# Patient Record
Sex: MALE | Race: BLACK OR AFRICAN AMERICAN | NOT HISPANIC OR LATINO | URBAN - METROPOLITAN AREA
[De-identification: names, ages, dates, MRNs, and addresses within clinical notes are randomized per-mention and may not be internally consistent; named-entity substitution may affect disease eponyms.]

---

## 2023-03-10 ENCOUNTER — INPATIENT (INPATIENT)
Facility: HOSPITAL | Age: 38
LOS: 3 days | Discharge: HOME | DRG: 286 | End: 2023-03-14
Attending: INTERNAL MEDICINE | Admitting: INTERNAL MEDICINE
Payer: MEDICAID

## 2023-03-10 VITALS
DIASTOLIC BLOOD PRESSURE: 104 MMHG | WEIGHT: 225.09 LBS | TEMPERATURE: 98 F | RESPIRATION RATE: 18 BRPM | OXYGEN SATURATION: 94 % | HEIGHT: 70 IN | SYSTOLIC BLOOD PRESSURE: 145 MMHG | HEART RATE: 106 BPM

## 2023-03-10 DIAGNOSIS — I50.9 HEART FAILURE, UNSPECIFIED: ICD-10-CM

## 2023-03-10 LAB
ALBUMIN SERPL ELPH-MCNC: 3.8 G/DL — SIGNIFICANT CHANGE UP (ref 3.3–5)
ALP SERPL-CCNC: 62 U/L — SIGNIFICANT CHANGE UP (ref 40–120)
ALT FLD-CCNC: 41 U/L — SIGNIFICANT CHANGE UP (ref 10–45)
ANION GAP SERPL CALC-SCNC: 9 MMOL/L — SIGNIFICANT CHANGE UP (ref 5–17)
APTT BLD: 29.1 SEC — SIGNIFICANT CHANGE UP (ref 27.5–35.5)
AST SERPL-CCNC: 24 U/L — SIGNIFICANT CHANGE UP (ref 10–40)
BASE EXCESS BLDV CALC-SCNC: 2.2 MMOL/L — SIGNIFICANT CHANGE UP (ref -2–3)
BASOPHILS # BLD AUTO: 0.02 K/UL — SIGNIFICANT CHANGE UP (ref 0–0.2)
BASOPHILS NFR BLD AUTO: 0.2 % — SIGNIFICANT CHANGE UP (ref 0–2)
BILIRUB SERPL-MCNC: 0.4 MG/DL — SIGNIFICANT CHANGE UP (ref 0.2–1.2)
BUN SERPL-MCNC: 14 MG/DL — SIGNIFICANT CHANGE UP (ref 7–23)
CA-I SERPL-SCNC: 1.14 MMOL/L — LOW (ref 1.15–1.33)
CALCIUM SERPL-MCNC: 8.8 MG/DL — SIGNIFICANT CHANGE UP (ref 8.4–10.5)
CHLORIDE BLDV-SCNC: 103 MMOL/L — SIGNIFICANT CHANGE UP (ref 96–108)
CHLORIDE SERPL-SCNC: 105 MMOL/L — SIGNIFICANT CHANGE UP (ref 96–108)
CO2 BLDV-SCNC: 30 MMOL/L — HIGH (ref 22–26)
CO2 SERPL-SCNC: 26 MMOL/L — SIGNIFICANT CHANGE UP (ref 22–31)
CREAT SERPL-MCNC: 1.06 MG/DL — SIGNIFICANT CHANGE UP (ref 0.5–1.3)
EGFR: 92 ML/MIN/1.73M2 — SIGNIFICANT CHANGE UP
EOSINOPHIL # BLD AUTO: 0.01 K/UL — SIGNIFICANT CHANGE UP (ref 0–0.5)
EOSINOPHIL NFR BLD AUTO: 0.1 % — SIGNIFICANT CHANGE UP (ref 0–6)
GAS PNL BLDA: SIGNIFICANT CHANGE UP
GAS PNL BLDV: 134 MMOL/L — LOW (ref 136–145)
GAS PNL BLDV: SIGNIFICANT CHANGE UP
GAS PNL BLDV: SIGNIFICANT CHANGE UP
GLUCOSE BLDV-MCNC: 98 MG/DL — SIGNIFICANT CHANGE UP (ref 70–99)
GLUCOSE SERPL-MCNC: 101 MG/DL — HIGH (ref 70–99)
HCO3 BLDV-SCNC: 28 MMOL/L — SIGNIFICANT CHANGE UP (ref 22–29)
HCT VFR BLD CALC: 53 % — HIGH (ref 39–50)
HCT VFR BLDA CALC: 55 % — HIGH (ref 39–51)
HGB BLD CALC-MCNC: 18.2 G/DL — HIGH (ref 12.6–17.4)
HGB BLD-MCNC: 17.3 G/DL — HIGH (ref 13–17)
IMM GRANULOCYTES NFR BLD AUTO: 0.6 % — SIGNIFICANT CHANGE UP (ref 0–0.9)
INR BLD: 0.97 RATIO — SIGNIFICANT CHANGE UP (ref 0.88–1.16)
LACTATE BLDV-MCNC: 1.5 MMOL/L — SIGNIFICANT CHANGE UP (ref 0.5–2)
LYMPHOCYTES # BLD AUTO: 1.18 K/UL — SIGNIFICANT CHANGE UP (ref 1–3.3)
LYMPHOCYTES # BLD AUTO: 14 % — SIGNIFICANT CHANGE UP (ref 13–44)
MCHC RBC-ENTMCNC: 28.6 PG — SIGNIFICANT CHANGE UP (ref 27–34)
MCHC RBC-ENTMCNC: 32.6 GM/DL — SIGNIFICANT CHANGE UP (ref 32–36)
MCV RBC AUTO: 87.7 FL — SIGNIFICANT CHANGE UP (ref 80–100)
MONOCYTES # BLD AUTO: 0.39 K/UL — SIGNIFICANT CHANGE UP (ref 0–0.9)
MONOCYTES NFR BLD AUTO: 4.6 % — SIGNIFICANT CHANGE UP (ref 2–14)
NEUTROPHILS # BLD AUTO: 6.77 K/UL — SIGNIFICANT CHANGE UP (ref 1.8–7.4)
NEUTROPHILS NFR BLD AUTO: 80.5 % — HIGH (ref 43–77)
NRBC # BLD: 0 /100 WBCS — SIGNIFICANT CHANGE UP (ref 0–0)
NT-PROBNP SERPL-SCNC: 504 PG/ML — HIGH (ref 0–300)
PCO2 BLDV: 48 MMHG — SIGNIFICANT CHANGE UP (ref 42–55)
PH BLDV: 7.38 — SIGNIFICANT CHANGE UP (ref 7.32–7.43)
PLATELET # BLD AUTO: 322 K/UL — SIGNIFICANT CHANGE UP (ref 150–400)
PO2 BLDV: 30 MMHG — SIGNIFICANT CHANGE UP (ref 25–45)
POTASSIUM BLDV-SCNC: 3.6 MMOL/L — SIGNIFICANT CHANGE UP (ref 3.5–5.1)
POTASSIUM SERPL-MCNC: 3.7 MMOL/L — SIGNIFICANT CHANGE UP (ref 3.5–5.3)
POTASSIUM SERPL-SCNC: 3.7 MMOL/L — SIGNIFICANT CHANGE UP (ref 3.5–5.3)
PROT SERPL-MCNC: 6.8 G/DL — SIGNIFICANT CHANGE UP (ref 6–8.3)
PROTHROM AB SERPL-ACNC: 11.1 SEC — SIGNIFICANT CHANGE UP (ref 10.5–13.4)
RAPID RVP RESULT: SIGNIFICANT CHANGE UP
RBC # BLD: 6.04 M/UL — HIGH (ref 4.2–5.8)
RBC # FLD: 12.7 % — SIGNIFICANT CHANGE UP (ref 10.3–14.5)
SAO2 % BLDV: 53.5 % — LOW (ref 67–88)
SARS-COV-2 RNA SPEC QL NAA+PROBE: SIGNIFICANT CHANGE UP
SODIUM SERPL-SCNC: 140 MMOL/L — SIGNIFICANT CHANGE UP (ref 135–145)
TROPONIN T, HIGH SENSITIVITY RESULT: 12 NG/L — SIGNIFICANT CHANGE UP (ref 0–51)
TROPONIN T, HIGH SENSITIVITY RESULT: 19 NG/L — SIGNIFICANT CHANGE UP (ref 0–51)
WBC # BLD: 8.42 K/UL — SIGNIFICANT CHANGE UP (ref 3.8–10.5)
WBC # FLD AUTO: 8.42 K/UL — SIGNIFICANT CHANGE UP (ref 3.8–10.5)

## 2023-03-10 PROCEDURE — 99285 EMERGENCY DEPT VISIT HI MDM: CPT

## 2023-03-10 PROCEDURE — 71275 CT ANGIOGRAPHY CHEST: CPT | Mod: 26,MA

## 2023-03-10 PROCEDURE — 71045 X-RAY EXAM CHEST 1 VIEW: CPT | Mod: 26

## 2023-03-10 RX ORDER — FUROSEMIDE 40 MG
40 TABLET ORAL DAILY
Refills: 0 | Status: DISCONTINUED | OUTPATIENT
Start: 2023-03-10 | End: 2023-03-13

## 2023-03-10 RX ORDER — ACETAMINOPHEN 500 MG
1000 TABLET ORAL ONCE
Refills: 0 | Status: COMPLETED | OUTPATIENT
Start: 2023-03-10 | End: 2023-03-10

## 2023-03-10 RX ORDER — INFLUENZA VIRUS VACCINE 15; 15; 15; 15 UG/.5ML; UG/.5ML; UG/.5ML; UG/.5ML
0.5 SUSPENSION INTRAMUSCULAR ONCE
Refills: 0 | Status: DISCONTINUED | OUTPATIENT
Start: 2023-03-10 | End: 2023-03-14

## 2023-03-10 RX ORDER — SACUBITRIL AND VALSARTAN 24; 26 MG/1; MG/1
1 TABLET, FILM COATED ORAL
Refills: 0 | Status: DISCONTINUED | OUTPATIENT
Start: 2023-03-10 | End: 2023-03-14

## 2023-03-10 RX ORDER — HEPARIN SODIUM 5000 [USP'U]/ML
5000 INJECTION INTRAVENOUS; SUBCUTANEOUS EVERY 12 HOURS
Refills: 0 | Status: DISCONTINUED | OUTPATIENT
Start: 2023-03-10 | End: 2023-03-14

## 2023-03-10 RX ADMIN — Medication 400 MILLIGRAM(S): at 12:15

## 2023-03-10 RX ADMIN — Medication 40 MILLIGRAM(S): at 19:50

## 2023-03-10 RX ADMIN — HEPARIN SODIUM 5000 UNIT(S): 5000 INJECTION INTRAVENOUS; SUBCUTANEOUS at 21:09

## 2023-03-10 RX ADMIN — SACUBITRIL AND VALSARTAN 1 TABLET(S): 24; 26 TABLET, FILM COATED ORAL at 21:09

## 2023-03-10 NOTE — H&P ADULT - ASSESSMENT
38  yr year-old male      witn no pmh       presents with shortness of breath, cough, and chest pain .  for  past  week or more        His symptoms have been getting progressively worse.   denies  fevers or chills.   pt   [place] from [facility]   about 2 weeks ago.   hypoxic on arrival.  with sinus tach   Ct chest angio, no pe .  chf   tele/  card/ e cho     sob,  from acute  ? diastolic chf ,    on iv   lasix    abg pending    house pulm called  by  team,  advised   to  call  icu  on dvt ppx        ra< from: CT Angio Chest PE Protocol w/ IV Cont (03.10.23 @ 15:40) >  MPRESSION:  No pulmonary embolism.  Pulmonary edema with trace pleural effusions in thesetting of   cardiomegaly.  --- End of Report ---  < end of copied text >   38  yr year-old male      witn no pmh       presents with shortness of breath, cough, and chest pain .  for  past  week or more   symptoms have been getting progressively worse./  denies  fevers or chills.   pt   [place]   [place]  has  his  own  business/  cleaning company       *  c/o  sob,  worsening     hypoxic on arrival.  with sinus tach   Ct chest angio, no pe . +.  chf    tele/  card/echo     sob,  from acute  ? diastolic chf ,     on iv   lasix    on bipap,  appears  conformable   talking   on phone     attempt  to  titrate   oxygen a s tolerated    echo pending     on dvt ppx        ra< from: CT Angio Chest PE Protocol w/ IV Cont (03.10.23 @ 15:40) >  MPRESSION:  No pulmonary embolism.  Pulmonary edema with trace pleural effusions in thesetting of   cardiomegaly.  --- End of Report ---  < end of copied text >

## 2023-03-10 NOTE — CONSULT NOTE ADULT - ASSESSMENT
38 year old male with no PMH, recent travel to [place] 1 month ago where he was diagnosed with ?yellow fever, presents to the ED with 1 week of cough, now progressively worsening sharp midsternal chest pain worse with inspiration and with coughing, and shortness of breath. Denies fever, congestion, nausea, vomiting, diarrhea, abdominal pain. Hasn't taken anything for symptoms.  chf/ pulmonary edema ?etiology  echo  esr/ crp  hiv  iv lasix/ ace/ beta blocker  tsh  taper o2 as tolerated  strict i/o  ace inhibitor  will start coreg as heart failure improves

## 2023-03-10 NOTE — ED ADULT NURSE NOTE - NS ED NURSE LEVEL OF CONSCIOUSNESS ORIENTATION
Internal Medicine Internal Medicine Internal Medicine Internal Medicine Internal Medicine Internal Medicine Oriented - self; Oriented - place; Oriented - time

## 2023-03-10 NOTE — CHART NOTE - NSCHARTNOTEFT_GEN_A_CORE
MEDICINE PA NOTE    Patient sent up from ED on bipap 10/5 40% fio2, SpO2 99%, complaining of SOB. Called house pulm and recommended to consult ICU as patient is new on bipap and likely requiring higher settings. House pulm to begin following tomorrow.     REYNA Gandhi [id number] MEDICINE PA NOTE    Patient sent up from ED on bipap 10/5 40% fio2, SpO2 99%, complaining of SOB. Called house pulm and recommended to consult ICU as patient is new on bipap and likely requiring higher settings. ICU consulted. House pulm to begin following tomorrow.     REYNA Gandhi [id number]

## 2023-03-10 NOTE — ED ADULT NURSE NOTE - OBJECTIVE STATEMENT
Lisa Monroy MD - Primary         Assistant Staff:       Surgical Staff:  Circ-1: Hansel Aranda RN  Scrub Tech-1: Maco Romero  Scrub Tech-Relief: Vinita Lopez  Scrub RN-Relief: Jimmy Serra RN  Surg Asst-1: Tanmay West  Float Staff: Lisset Pimentel  Event Time In   Incision Start 1609   Incision Close 1940     Anesthesia: General   Estimated Blood Loss: 100 ml  Specimens:   ID Type Source Tests Collected by Time Destination   1 : 167 [facility] SKIN Preservative Hernia Sac  Lisa Monroy SUNDAR SFT [id number] --  - [id number]   MESH SUNDAR SFT [id number] --  [id number] BARD DAVOL [id number] N/A 1 Implanted Pt comes with c/o cough and CP for couple days. Today in AM CP got worse 9/10. Pt reports travel to [place], [place] on [place] 20th. Pt is AXOX4. Pt is on cardiac monitor and continuous pulse OX, O2 sat 93%.

## 2023-03-10 NOTE — H&P ADULT - HISTORY OF PRESENT ILLNESS
38  yr year-old male      witn no pmh       presents with shortness of breath, cough, and chest pain .  for  past  week or more        His symptoms have been getting progressively worse.   denies  fevers or chills.   pt   fle[place] [facility]   about 2 weeks ago.   hypoxic on arrival   Ct chest angio, no pe

## 2023-03-10 NOTE — CONSULT NOTE ADULT - SUBJECTIVE AND OBJECTIVE BOX
CHIEF COMPLAINT:Patient is a 38y old  Male who presents with a chief complaint of sob/cp (10 Mar 2023 18:57)      HPI:  38 year old male with no PMH, recent travel to [place] 1 month ago where he was diagnosed with ?yellow fever, presents to the ED with 1 week of cough, now progressively worsening sharp midsternal chest pain worse with inspiration and with coughing, and shortness of breath. Denies fever, congestion, nausea, vomiting, diarrhea, abdominal pain. Hasn't taken anything for symptoms.      PAST MEDICAL & SURGICAL HISTORY:  No pertinent past medical history      No significant past surgical history    MEDICATIONS  (STANDING):  furosemide   Injectable 40 milliGRAM(s) IV Push daily  heparin   Injectable 5000 Unit(s) SubCutaneous every 12 hours  influenza   Vaccine 0.5 milliLiter(s) IntraMuscular once    MEDICATIONS  (PRN):      FAMILY HISTORY:      SOCIAL HISTORY:    [x ] Non-smoker  [ ] Smoker  [ ] Alcohol    Allergies    No Known Allergies    Intolerances    	    REVIEW OF SYSTEMS:  CONSTITUTIONAL: No fever, weight loss, or fatigue  EYES: No eye pain, visual disturbances, or discharge  ENT:  No difficulty hearing, tinnitus, vertigo; No sinus or throat pain  NECK: No pain or stiffness  RESPIRATORY: No cough, wheezing, chills or hemoptysis; + Shortness of Breath  CARDIOVASCULAR: + chest pain,no  palpitations, passing out, dizziness, or leg swelling  GASTROINTESTINAL: No abdominal or epigastric pain. No nausea, vomiting, or hematemesis; No diarrhea or constipation. No melena or hematochezia.  GENITOURINARY: No dysuria, frequency, hematuria, or incontinence  NEUROLOGICAL: No headaches, memory loss, loss of strength, numbness, or tremors  SKIN: No itching, burning, rashes, or lesions   LYMPH Nodes: No enlarged glands  ENDOCRINE: No heat or cold intolerance; No hair loss  MUSCULOSKELETAL: No joint pain or swelling; No muscle, back, or extremity pain  PSYCHIATRIC: No depression, anxiety, mood swings, or difficulty sleeping  HEME/LYMPH: No easy bruising, or bleeding gums  ALLERGY AND IMMUNOLOGIC: No hives or eczema	    [x ] All others negative	  [ ] Unable to obtain    PHYSICAL EXAM:  T(C): 36.7 (03-10-23 @ 17:45), Max: 36.8 (03-10-23 @ 13:30)  HR: 98 (03-10-23 @ 17:45) (95 - 107)  BP: 147/99 (03-10-23 @ 17:45) (121/111 - 159/142)  RR: 22 (03-10-23 @ 17:45) (18 - 39)  SpO2: 99% (03-10-23 @ 17:45) (92% - 100%)  Wt(kg): --  I&O's Summary      Appearance: Normal	  HEENT:   Normal oral mucosa, PERRL, EOMI	  Lymphatic: No lymphadenopathy  Cardiovascular: Normal S1 S2, No JVD, + murmurs, No edema  Respiratory: rhonchi  Gastrointestinal:  Soft, Non-tender, + BS	  Skin: No rashes, No ecchymoses, No cyanosis	  Neurologic: Non-focal  Extremities: Normal range of motion, No clubbing, cyanosis or edema  Vascular: Peripheral pulses palpable 2+ bilaterally    TELEMETRY: 	    ECG:  	  RADIOLOGY:  OTHER: 	  	  LABS:	 	    CARDIAC MARKERS:                              17.3   8.42  )-----------( 322      ( 10 Mar 2023 11:43 )             53.0     03-10    140  |  105  |  14  ----------------------------<  101<H>  3.7   |  26  |  1.06    Ca    8.8      10 Mar 2023 11:43    TPro  6.8  /  Alb  3.8  /  TBili  0.4  /  DBili  x   /  AST  24  /  ALT  41  /  AlkPhos  62  03-10    proBNP:   Lipid Profile:   HgA1c:   TSH:   PT/INR - ( 10 Mar 2023 11:43 )   PT: 11.1 sec;   INR: 0.97 ratio         PTT - ( 10 Mar 2023 11:43 )  PTT:29.1 sec    PREVIOUS DIAGNOSTIC TESTING:      < from: 12 Lead ECG (03.10.23 @ 11:06) >  Diagnosis Line SINUS TACHYCARDIA  POSSIBLE LEFT ATRIAL ENLARGEMENT  RIGHT AXIS DEVIATION  LEFT VENTRICULAR HYPERTROPHY WITH REPOLARIZATION ABNORMALITY ( [place] product )  ABNORMAL ECG  NO PREVIOUS ECGS AVAILABLE      < from: CT Angio Chest PE Protocol w/ IV Cont (03.10.23 @ 15:40) >  No pulmonary embolism.    Pulmonary edema with trace pleural effusions in thesetting of   cardiomegaly.    < from: Xray Chest 1 View- PORTABLE-Urgent (03.10.23 @ 12:41) >  Bilateral hazy opacifications, which may represent pulmonary edema or   pneumonia.

## 2023-03-10 NOTE — H&P ADULT - NSHPREVIEWOFSYSTEMS_GEN_ALL_CORE
REVIEW OF SYSTEMS:  CONSTITUTIONAL: No fever,  no  weight loss  ENT:  No  tinnitus,   no   vertigo  NECK: No pain or stiffness  RESPIRATORY: No cough, wheezing, chills or hemoptysis;    + Shortness of Breath  CARDIOVASCULAR: No chest pain, palpitations, dizziness  GASTROINTESTINAL: No abdominal or epigastric pain. No nausea, vomiting, or hematemesis; No diarrhea  No melena or hematochezia.  GENITOURINARY: No dysuria, frequency, hematuria, or incontinence  NEUROLOGICAL: No headaches  SKIN: No itching,  no   rash  LYMPH Nodes: No enlarged glands  ENDOCRINE: No heat or cold intolerance  MUSCULOSKELETAL: No joint pain or swelling  PSYCHIATRIC: No depression, anxiety  HEME/LYMPH: No easy bruising, or bleeding gums  ALLERGY AND IMMUNOLOGIC: No hives or eczema

## 2023-03-10 NOTE — ED PROVIDER NOTE - OBJECTIVE STATEMENT
38 year old male with no PMH, recent travel to [place] 1 month ago where he was diagnosed with ?yellow fever, presents to the ED with 1 week of cough, now progressively worsening sharp midsternal chest pain worse with inspiration and with coughing, and shortness of breath. Denies fever, congestion, nausea, vomiting, diarrhea, abdominal pain. Hasn't taken anything for symptoms. 38 year old male with no PMH, recent travel to [place] 1 month ago where he was diagnosed with ?yellow fever, presents to the ED with 1 week of cough, now progressively worsening sharp midsternal chest pain worse with inspiration and with coughing, and shortness of breath. Denies fever, congestion, nausea, vomiting, diarrhea, abdominal pain. Hasn't taken anything for symptoms.    Attendin-year-old male presents with shortness of breath, cough, and chest pain since about Monday of this week.  His symptoms have been getting progressively worse.  There are no fevers or chills.  Patiently [place] the [place] back from [facility] [place] about 2 weeks ago.  His symptoms are not positional in nature.

## 2023-03-10 NOTE — H&P ADULT - NSHPPHYSICALEXAM_GEN_ALL_CORE
T(C): 36.7 (03-10-23 @ 17:45), Max: 36.8 (03-10-23 @ 13:30)  HR: 98 (03-10-23 @ 17:45) (95 - 107)  BP: 147/99 (03-10-23 @ 17:45) (121/111 - 159/142)  RR: 22 (03-10-23 @ 17:45) (18 - 39)  SpO2: 99% (03-10-23 @ 17:45) (92% - 100%)  GENERAL: NAD, lying in bed comfortably  HEAD:  Atraumatic, normocephalic  EYES: EOMI, PERRLA, conjunctiva and sclera clear  NECK: Supple, trachea midline, no JVD  HEART: Regular rate and rhythm, no murmurs, rubs, or gallops  LUNGS: Unlabored respirations.  Clear to auscultation bilaterally, no crackles, wheezing, or rhonchi  ABDOMEN: Soft, nontender, nondistended, +BS  EXTREMITIES: 2+ peripheral pulses bilaterally. No clubbing, cyanosis, or edema  NERVOUS SYSTEM:  A&Ox3, moving all extremities, no focal deficits   SKIN: No rashes or lesions

## 2023-03-10 NOTE — H&P ADULT - NSHPLABSRESULTS_GEN_ALL_CORE
LABS:                        17.3   8.42  )-----------( 322      ( 10 Mar 2023 11:43 )             53.0     03-10    140  |  105  |  14  ----------------------------<  101<H>  3.7   |  26  |  1.06    Ca    8.8      10 Mar 2023 11:43    TPro  6.8  /  Alb  3.8  /  TBili  0.4  /  DBili  x   /  AST  24  /  ALT  41  /  AlkPhos  62  03-10    PT/INR - ( 10 Mar 2023 11:43 )   PT: 11.1 sec;   INR: 0.97 ratio         PTT - ( 10 Mar 2023 11:43 )  PTT:29.1 sec            03-10 @ 11:41  3.6  30

## 2023-03-10 NOTE — ED PROVIDER NOTE - CLINICAL SUMMARY MEDICAL DECISION MAKING FREE TEXT BOX
Yadira Malin DO PGY-2  38 year old male with no known PMH, recent travel to [place] presents with cough, chest pain, shortness of breath. Hypertensive, tachycardic, tachypneic, mildly hypoxic. EKG sinus tachy, with j point elevation V2/V3, TWI II, III, avF. Concern for PE, viral syndrome, ACS (less likely), PNA. Will obtain CTA, labs, start nasal cannula, and pt will likely require admission.

## 2023-03-10 NOTE — ED PROVIDER NOTE - PHYSICAL EXAMINATION
PHYSICAL EXAM:  CONSTITUTIONAL: Well appearing, awake, alert, oriented to person, place, time/situation and in no apparent distress.  HEAD: Atraumatic  EYES: Clear bilaterally, pupils equal, round and reactive to light.  ENMT: Airway patent, Nasal mucosa clear. Mouth with normal mucosa. Uvula is midline.   CARDIAC: Normal rate, regular rhythm. +S1/S2. No murmurs, rubs or gallops.  RESPIRATORY: Visibly tachypneic, Breath sounds clear and equal bilaterally.  ABDOMEN:  Soft, nontender, nondistended. No rebound tenderness or guarding.  NEUROLOGICAL: Alert and oriented, no focal deficits, no motor or sensory deficits.   MSK: Trade edema of right lower extremity. No clubbing, cyanosis. Full range of motion of all extremities.   SKIN: Skin warm and dry. No evidence of rashes or lesions.

## 2023-03-11 ENCOUNTER — TRANSCRIPTION ENCOUNTER (OUTPATIENT)
Age: 38
End: 2023-03-11

## 2023-03-11 LAB
AMPHET UR-MCNC: NEGATIVE — SIGNIFICANT CHANGE UP
ANION GAP SERPL CALC-SCNC: 13 MMOL/L — SIGNIFICANT CHANGE UP (ref 5–17)
BARBITURATES UR SCN-MCNC: NEGATIVE — SIGNIFICANT CHANGE UP
BENZODIAZ UR-MCNC: NEGATIVE — SIGNIFICANT CHANGE UP
BUN SERPL-MCNC: 11 MG/DL — SIGNIFICANT CHANGE UP (ref 7–23)
CALCIUM SERPL-MCNC: 8.5 MG/DL — SIGNIFICANT CHANGE UP (ref 8.4–10.5)
CHLORIDE SERPL-SCNC: 105 MMOL/L — SIGNIFICANT CHANGE UP (ref 96–108)
CHOLEST SERPL-MCNC: 241 MG/DL — HIGH
CO2 SERPL-SCNC: 22 MMOL/L — SIGNIFICANT CHANGE UP (ref 22–31)
COCAINE METAB.OTHER UR-MCNC: NEGATIVE — SIGNIFICANT CHANGE UP
CREAT SERPL-MCNC: 0.91 MG/DL — SIGNIFICANT CHANGE UP (ref 0.5–1.3)
CRP SERPL-MCNC: 17 MG/L — HIGH (ref 0–4)
EGFR: 111 ML/MIN/1.73M2 — SIGNIFICANT CHANGE UP
ERYTHROCYTE [SEDIMENTATION RATE] IN BLOOD: 27 MM/HR — HIGH (ref 0–15)
GLUCOSE SERPL-MCNC: 87 MG/DL — SIGNIFICANT CHANGE UP (ref 70–99)
HCT VFR BLD CALC: 53.8 % — HIGH (ref 39–50)
HDLC SERPL-MCNC: 68 MG/DL — SIGNIFICANT CHANGE UP
HGB BLD-MCNC: 17.9 G/DL — HIGH (ref 13–17)
HIV 1 & 2 AB SERPL IA.RAPID: SIGNIFICANT CHANGE UP
LIPID PNL WITH DIRECT LDL SERPL: 148 MG/DL — HIGH
MAGNESIUM SERPL-MCNC: 2.2 MG/DL — SIGNIFICANT CHANGE UP (ref 1.6–2.6)
MCHC RBC-ENTMCNC: 29.2 PG — SIGNIFICANT CHANGE UP (ref 27–34)
MCHC RBC-ENTMCNC: 33.3 GM/DL — SIGNIFICANT CHANGE UP (ref 32–36)
MCV RBC AUTO: 87.6 FL — SIGNIFICANT CHANGE UP (ref 80–100)
METHADONE UR-MCNC: NEGATIVE — SIGNIFICANT CHANGE UP
NON HDL CHOLESTEROL: 173 MG/DL — HIGH
NRBC # BLD: 0 /100 WBCS — SIGNIFICANT CHANGE UP (ref 0–0)
OPIATES UR-MCNC: NEGATIVE — SIGNIFICANT CHANGE UP
OXYCODONE UR-MCNC: NEGATIVE — SIGNIFICANT CHANGE UP
PCP SPEC-MCNC: SIGNIFICANT CHANGE UP
PCP UR-MCNC: NEGATIVE — SIGNIFICANT CHANGE UP
PLATELET # BLD AUTO: 314 K/UL — SIGNIFICANT CHANGE UP (ref 150–400)
POTASSIUM SERPL-MCNC: 3.5 MMOL/L — SIGNIFICANT CHANGE UP (ref 3.5–5.3)
POTASSIUM SERPL-SCNC: 3.5 MMOL/L — SIGNIFICANT CHANGE UP (ref 3.5–5.3)
RBC # BLD: 6.14 M/UL — HIGH (ref 4.2–5.8)
RBC # FLD: 12.8 % — SIGNIFICANT CHANGE UP (ref 10.3–14.5)
SODIUM SERPL-SCNC: 140 MMOL/L — SIGNIFICANT CHANGE UP (ref 135–145)
THC UR QL: NEGATIVE — SIGNIFICANT CHANGE UP
TRIGL SERPL-MCNC: 126 MG/DL — SIGNIFICANT CHANGE UP
TSH SERPL-MCNC: 0.36 UIU/ML — SIGNIFICANT CHANGE UP (ref 0.27–4.2)
WBC # BLD: 11.26 K/UL — HIGH (ref 3.8–10.5)
WBC # FLD AUTO: 11.26 K/UL — HIGH (ref 3.8–10.5)

## 2023-03-11 PROCEDURE — 99254 IP/OBS CNSLTJ NEW/EST MOD 60: CPT

## 2023-03-11 PROCEDURE — 99223 1ST HOSP IP/OBS HIGH 75: CPT | Mod: GC

## 2023-03-11 RX ORDER — SPIRONOLACTONE 25 MG/1
12.5 TABLET, FILM COATED ORAL DAILY
Refills: 0 | Status: DISCONTINUED | OUTPATIENT
Start: 2023-03-11 | End: 2023-03-12

## 2023-03-11 RX ORDER — ASPIRIN/CALCIUM CARB/MAGNESIUM 324 MG
81 TABLET ORAL DAILY
Refills: 0 | Status: DISCONTINUED | OUTPATIENT
Start: 2023-03-11 | End: 2023-03-14

## 2023-03-11 RX ORDER — ATORVASTATIN CALCIUM 80 MG/1
10 TABLET, FILM COATED ORAL AT BEDTIME
Refills: 0 | Status: DISCONTINUED | OUTPATIENT
Start: 2023-03-11 | End: 2023-03-12

## 2023-03-11 RX ORDER — METOPROLOL TARTRATE 50 MG
25 TABLET ORAL DAILY
Refills: 0 | Status: DISCONTINUED | OUTPATIENT
Start: 2023-03-11 | End: 2023-03-14

## 2023-03-11 RX ORDER — CHLORHEXIDINE GLUCONATE 213 G/1000ML
1 SOLUTION TOPICAL DAILY
Refills: 0 | Status: DISCONTINUED | OUTPATIENT
Start: 2023-03-11 | End: 2023-03-14

## 2023-03-11 RX ORDER — ACETAMINOPHEN 500 MG
650 TABLET ORAL ONCE
Refills: 0 | Status: COMPLETED | OUTPATIENT
Start: 2023-03-11 | End: 2023-03-11

## 2023-03-11 RX ORDER — POTASSIUM CHLORIDE 20 MEQ
40 PACKET (EA) ORAL ONCE
Refills: 0 | Status: COMPLETED | OUTPATIENT
Start: 2023-03-11 | End: 2023-03-11

## 2023-03-11 RX ADMIN — Medication 650 MILLIGRAM(S): at 06:41

## 2023-03-11 RX ADMIN — ATORVASTATIN CALCIUM 10 MILLIGRAM(S): 80 TABLET, FILM COATED ORAL at 21:26

## 2023-03-11 RX ADMIN — Medication 25 MILLIGRAM(S): at 11:45

## 2023-03-11 RX ADMIN — Medication 650 MILLIGRAM(S): at 05:41

## 2023-03-11 RX ADMIN — HEPARIN SODIUM 5000 UNIT(S): 5000 INJECTION INTRAVENOUS; SUBCUTANEOUS at 10:14

## 2023-03-11 RX ADMIN — SACUBITRIL AND VALSARTAN 1 TABLET(S): 24; 26 TABLET, FILM COATED ORAL at 10:15

## 2023-03-11 RX ADMIN — SPIRONOLACTONE 12.5 MILLIGRAM(S): 25 TABLET, FILM COATED ORAL at 13:20

## 2023-03-11 RX ADMIN — CHLORHEXIDINE GLUCONATE 1 APPLICATION(S): 213 SOLUTION TOPICAL at 11:47

## 2023-03-11 RX ADMIN — Medication 81 MILLIGRAM(S): at 11:45

## 2023-03-11 RX ADMIN — Medication 40 MILLIEQUIVALENT(S): at 10:15

## 2023-03-11 RX ADMIN — SACUBITRIL AND VALSARTAN 1 TABLET(S): 24; 26 TABLET, FILM COATED ORAL at 21:26

## 2023-03-11 NOTE — PROGRESS NOTE ADULT - SUBJECTIVE AND OBJECTIVE BOX
afebrile    REVIEW OF SYSTEMS:  CONSTITUTIONAL: No fever,  no  weight loss  ENT:  No  tinnitus,   no   vertigo  NECK: No pain or stiffness  RESPIRATORY: No cough, wheezing, chills or hemoptysis;    No Shortness of Breath  CARDIOVASCULAR: No chest pain, palpitations, dizziness  GASTROINTESTINAL: No abdominal or epigastric pain. No nausea, vomiting, or hematemesis; No diarrhea  No melena or hematochezia.  GENITOURINARY: No dysuria, frequency, hematuria, or incontinence  NEUROLOGICAL: No headaches  SKIN: No itching,  no   rash  LYMPH Nodes: No enlarged glands  ENDOCRINE: No heat or cold intolerance  MUSCULOSKELETAL: No joint pain or swelling  PSYCHIATRIC: No depression, anxiety  HEME/LYMPH: No easy bruising, or bleeding gums  ALLERGY AND IMMUNOLOGIC: No hives or eczema	    MEDICATIONS  (STANDING):  chlorhexidine 2% Cloths 1 Application(s) Topical daily  furosemide   Injectable 40 milliGRAM(s) IV Push daily  heparin   Injectable 5000 Unit(s) SubCutaneous every 12 hours  influenza   Vaccine 0.5 milliLiter(s) IntraMuscular once  potassium chloride    Tablet ER 40 milliEquivalent(s) Oral once  sacubitril 24 mG/valsartan 26 mG 1 Tablet(s) Oral two times a day    MEDICATIONS  (PRN):      Vital Signs Last 24 Hrs  T(C): 36.3 (11 Mar 2023 08:00), Max: 36.8 (10 Mar 2023 13:30)  T(F): 97.4 (11 Mar 2023 08:00), Max: 98.2 (10 Mar 2023 13:30)  HR: 80 (11 Mar 2023 08:00) (74 - 107)  BP: 134/80 (11 Mar 2023 08:00) (121/83 - 159/142)  BP(mean): 116 (10 Mar 2023 16:09) (116 - 150)  RR: 18 (11 Mar 2023 08:00) (18 - 39)  SpO2: 95% (11 Mar 2023 08:00) (92% - 100%)    Parameters below as of 11 Mar 2023 08:00  Patient On (Oxygen Delivery Method): nasal cannula  O2 Flow (L/min): 3    CAPILLARY BLOOD GLUCOSE        I&O's Summary    10 Mar 2023 07:01  -  11 Mar 2023 07:00  --------------------------------------------------------  IN: 340 mL / OUT: 1900 mL / NET: -1560 mL    11 Mar 2023 06:01  -  11 Mar 2023 09:04  --------------------------------------------------------  IN: 240 mL / OUT: 0 mL / NET: 240 mL          Appearance: Normal	  HEENT:   Normal oral mucosa, PERRL, EOMI	  Lymphatic: No lymphadenopathy  Cardiovascular: Normal S1 S2, No JVD  Respiratory: Lungs clear to auscultation	  Psychiatry: A & O x 3, Mood & affect appropriate  Gastrointestinal:  Soft, Non-tender, + BS	  Skin: No rash, No ecchymoses	  Extremities: Normal range of motion  Vascular: Peripheral pulses palpable bilaterally    LABS:                        17.9   11.26 )-----------( 314      ( 11 Mar 2023 06:20 )             53.8     03-11    140  |  105  |  11  ----------------------------<  87  3.5   |  22  |  0.91    Ca    8.5      11 Mar 2023 06:17  Mg     2.2     03-11    TPro  6.8  /  Alb  3.8  /  TBili  0.4  /  DBili  x   /  AST  24  /  ALT  41  /  AlkPhos  62  03-10    PT/INR - ( 10 Mar 2023 11:43 )   PT: 11.1 sec;   INR: 0.97 ratio         PTT - ( 10 Mar 2023 11:43 )  PTT:29.1 sec          ABG - ( 10 Mar 2023 23:06 )  pH, Arterial: 7.44  pH, Blood: x     /  pCO2: 41    /  pO2: 88    / HCO3: 28    / Base Excess: 3.3   /  SaO2: 98.2              03-10 @ 11:41  3.6  30      Thyroid Stimulating Hormone, Serum: 0.36 uIU/mL (03-11 @ 06:17)          Consultant(s) Notes Reviewed:      Care Discussed with Consultants/Other Providers:

## 2023-03-11 NOTE — DISCHARGE NOTE PROVIDER - PROVIDER TOKENS
PROVIDER:[TOKEN:[[id number]],FOLLOWUP:[1 week]],PROVIDER:[TOKEN:[[id number]],FOLLOWUP:[1 week]]

## 2023-03-11 NOTE — DISCHARGE NOTE PROVIDER - HOSPITAL COURSE
37yo Male with no known PMHx who presented with one week of non-productive cough and shortness of breath. Symptoms progressively became worse to the point where he was waking up from sleep gasping for air which prompted him to come into the ED for further evaluation. Was diuresed with IV Lasix, now with improvement in cough and shortness of breath.     Was born in [place]. Moved [place] the [place] age 25. Has a green card. Does not have insurance, reports social work team is working on obtaining insurance for him here. Lives in [place].     Denies any prior medical history. Reports growing up without any medical issues. He has brothers and sisters who are all healthy. Does not have children. No known medical issues with his parents or other relatives.    Drinks 6 shots of vodka daily. Vapes several times a day (nicotine). Denies illicit drug use.   ·  Problem: Acute systolic congestive heart failure.   ·  Recommendation: - NICM. LHC with luminal irregularities. Etiology unclear. Does have significant alcohol and tobacco use. Please obtain Cardiac MRI. Can consider genetic testing as an outpatient.    - RHC with elevated filling pressures. IV lasix transitioned to PO.    - Continue Metoprolol XL 25mg daily  - Continue Entresto 24-26mg BID, hold for SBP < 90  - Continue Aldactone 25mg daily  - Check daily standing weights, strict I+Os.   39 yo male with no known PMHx who presented with one week of non-productive cough and shortness of breath. Symptoms progressively became worse to the point where he was waking up from sleep gasping for air which prompted him to come into the ED for further evaluation. Was diuresed with IV Lasix, now with improvement in cough and shortness of breath.     Was born in [facility]. Moved [place] the [place] at age 25. Has a green card. Does not have health insurance, reports social work team is working on obtaining insurance for him here. Lives in [facility].  Denies any prior medical history. Reports growing up without any medical issues. He has brothers and sisters who are all healthy. Does not have children. No known medical issues with his parents or other relatives.    Drinks 6 shots of vodka daily. Vapes several times a day (nicotine). Denies illicit drug use.     Problem: Acute systolic congestive heart failure.   Recommendation: - NICM. LHC with luminal irregularities. Etiology unclear. Does have significant alcohol and tobacco use. Please obtain Cardiac MRI. Can consider genetic testing as an outpatient.    - RHC with elevated filling pressures. IV lasix transitioned to PO.    - Continue Metoprolol XL 25mg daily  - Continue Entresto 24-26mg BID, hold for SBP < 90  - Continue Aldactone 25mg daily  - Check daily standing weights, strict I+Os.  Cardiac MRI confirms non-ischemic cardiomyopathy, needs outpatient Cardio follow-up once health insurance arranged.     Was referred to medicaid clinic.

## 2023-03-11 NOTE — PROGRESS NOTE ADULT - SUBJECTIVE AND OBJECTIVE BOX
CARDIOLOGY     PROGRESS  NOTE   ________________________________________________    CHIEF COMPLAINT:Patient is a 38y old  Male who presents with a chief complaint of sob/cp (11 Mar 2023 09:03)  no complain  	  REVIEW OF SYSTEMS:  CONSTITUTIONAL: No fever, weight loss, or fatigue  EYES: No eye pain, visual disturbances, or discharge  ENT:  No difficulty hearing, tinnitus, vertigo; No sinus or throat pain  NECK: No pain or stiffness  RESPIRATORY: No cough, wheezing, chills or hemoptysis; + decrease  Shortness of Breath  CARDIOVASCULAR: No chest pain, palpitations, passing out, dizziness, or leg swelling  GASTROINTESTINAL: No abdominal or epigastric pain. No nausea, vomiting, or hematemesis; No diarrhea or constipation. No melena or hematochezia.  GENITOURINARY: No dysuria, frequency, hematuria, or incontinence  NEUROLOGICAL: No headaches, memory loss, loss of strength, numbness, or tremors  SKIN: No itching, burning, rashes, or lesions   LYMPH Nodes: No enlarged glands  ENDOCRINE: No heat or cold intolerance; No hair loss  MUSCULOSKELETAL: No joint pain or swelling; No muscle, back, or extremity pain  PSYCHIATRIC: No depression, anxiety, mood swings, or difficulty sleeping  HEME/LYMPH: No easy bruising, or bleeding gums  ALLERGY AND IMMUNOLOGIC: No hives or eczema	    [ ] All others negative	  [ ] Unable to obtain    PHYSICAL EXAM:  T(C): 36.8 (03-11-23 @ 11:15), Max: 36.8 (03-10-23 @ 13:30)  HR: 99 (03-11-23 @ 11:15) (74 - 107)  BP: 120/98 (03-11-23 @ 11:15) (120/98 - 159/142)  RR: 18 (03-11-23 @ 11:48) (18 - 39)  SpO2: 94% (03-11-23 @ 11:48) (92% - 100%)  Wt(kg): --  I&O's Summary    10 Mar 2023 07:01  -  11 Mar 2023 07:00  --------------------------------------------------------  IN: 340 mL / OUT: 1900 mL / NET: -1560 mL    11 Mar 2023 06:01  -  11 Mar 2023 11:58  --------------------------------------------------------  IN: 240 mL / OUT: 0 mL / NET: 240 mL        Appearance: Normal	  HEENT:   Normal oral mucosa, PERRL, EOMI	  Lymphatic: No lymphadenopathy  Cardiovascular: Normal S1 S2, No JVD, + murmurs, No edema  Respiratory: decrease bs  Psychiatry: A & O x 3, Mood & affect appropriate  Gastrointestinal:  Soft, Non-tender, + BS	  Skin: No rashes, No ecchymoses, No cyanosis	  Neurologic: Non-focal  Extremities: Normal range of motion, No clubbing, cyanosis or edema  Vascular: Peripheral pulses palpable 2+ bilaterally    MEDICATIONS  (STANDING):  aspirin  chewable 81 milliGRAM(s) Oral daily  atorvastatin 10 milliGRAM(s) Oral at bedtime  chlorhexidine 2% Cloths 1 Application(s) Topical daily  furosemide   Injectable 40 milliGRAM(s) IV Push daily  heparin   Injectable 5000 Unit(s) SubCutaneous every 12 hours  influenza   Vaccine 0.5 milliLiter(s) IntraMuscular once  metoprolol succinate ER 25 milliGRAM(s) Oral daily  sacubitril 24 mG/valsartan 26 mG 1 Tablet(s) Oral two times a day      TELEMETRY: 	    ECG:  	  RADIOLOGY:  OTHER: 	  	  LABS:	 	    CARDIAC MARKERS:                                17.9   11.26 )-----------( 314      ( 11 Mar 2023 06:20 )             53.8     03-11    140  |  105  |  11  ----------------------------<  87  3.5   |  22  |  0.91    Ca    8.5      11 Mar 2023 06:17  Mg     2.2     03-11    TPro  6.8  /  Alb  3.8  /  TBili  0.4  /  DBili  x   /  AST  24  /  ALT  41  /  AlkPhos  62  03-10    proBNP:   Lipid Profile: Cholesterol 241  LDL --  HDL 68      HgA1c:   TSH: Thyroid Stimulating Hormone, Serum: 0.36 uIU/mL (03-11 @ 06:17)    PT/INR - ( 10 Mar 2023 11:43 )   PT: 11.1 sec;   INR: 0.97 ratio         PTT - ( 10 Mar 2023 11:43 )  PTT:29.1 sec    Rapid HIV-1/2 Antibody (03.11.23 @ 06:17)    Rapid HIV-1/2 Antibody: Nonreact: This Rapid HIV test reactive results is preliminary.  Further  confirmatory testing according to the CDC/[place] HIV testing algorithm will  follow, and such confirmatory results must be considered in making a  diagnosis related to HIV infection. FurtherHIV tests include a HIV 4th  generation antibody/antigen assay, HIV confirmatory/differentiation  testing, and a nucleic acid testing if needed.  Method: Qualitative Immunoassay      Assessment and plan  ---------------------------  38 year old male with no PMH, recent travel to [place] 1 month ago where he was diagnosed with ?yellow fever, presents to the ED with 1 week of cough, now progressively worsening sharp midsternal chest pain worse with inspiration and with coughing, and shortness of breath. Denies fever, congestion, nausea, vomiting, diarrhea, abdominal pain. Hasn't taken anything for symptoms.  chf/ pulmonary edema ?etiology  echo  esr/ crp  hiv  iv lasix  tsh  taper o2 as tolerated  strict i/o  started on Entresto  6 beats of wct add metoprolol er 25 mg daily  awaiting echo/ ID eval r/i infectious cause of cardiomyopathy from [place]  add aldactone 12.5 mg daily

## 2023-03-11 NOTE — CONSULT NOTE ADULT - ATTENDING COMMENTS
Patient seen and examined on [facility]. Patient is a generally healthy 38M without significant PMHx who presented with shortness of breath after a recent (approximately 6 weeks ago) trip to [place] where he thinks he had yellow fever.     He has a heavy history of vaping (nicotine) and hookah use. He denies any known history of lung disease and does not use any pulmonary medications. On presentation to the ED the patient was SOB and placed on BIPAP. He had lung imaging which was suggestive of a volume overload state. He was admitted to the medical service and is being followed by cardiology.    Patient is nontoxic appearing. At the time of our evaluation the patient was breathing comfortably on RA. He notes an intermittent cough with inability to clear secretions. He is noted to have a cardiac murmur on exam.    1. Acute Hypoxemic Respiratory Failure - now improved and patient on RA  - Maintain O2 saturation > 90%  - Would stop BIPAP as patient does not appear to have increased work of breathing or signs of respiratory distress  - Incentive spirometer and acapella to facilitate breathing  2. Abnormal CT Chest - most consistent with pulmonary edema given enlarged PA, trace pleural effusions, interlobular septal thickening, and mediastinal and hilar adenopathy  - Continue diuresis with goal net negative - though patient does appear improved  - Check 2D echo to evaluate for cardiac dysfunction, valvular function, and to r/o pulmonary hypertension  - Repeat CT chest in 4-6 weeks to ensure resolution of opacities  3. Wheezing and cough - overall improved with present treatment  - Can give a trial of bronchodilators  - Outpatient PFTs  - Cessation of vaping will be important for patient's long term health - this was discussed at length and he appears contemplative. He defers nicotine replacement therapy at this time  4. R/O Obstructive Sleep Apnea - patient does snore and would benefit from outpatient evaluation for sleep disorder breathing  - Weight loss  5. DVT proph Patient seen and examined on [facility]. Patient is a generally healthy 38M without significant PMHx who presented with shortness of breath after a recent (approximately 6 weeks ago) trip to [place] where he thinks he had yellow fever.     He has a heavy history of vaping (nicotine) and hookah use. He denies any known history of lung disease and does not use any pulmonary medications. On presentation to the ED the patient was SOB and placed on BIPAP. He had lung imaging which was suggestive of a volume overload state. He was admitted to the medical service and is being followed by cardiology.    Patient is nontoxic appearing. At the time of our evaluation the patient was breathing comfortably on RA. He notes an intermittent cough with inability to clear secretions. He is noted to have a cardiac murmur on exam.    1. Acute Hypoxemic Respiratory Failure - now improved and patient on RA  - Maintain O2 saturation > 90%  - Would stop BIPAP as patient does not appear to have increased work of breathing or signs of respiratory distress  - Incentive spirometer and acapella to facilitate breathing  2. Abnormal CT Chest - most consistent with pulmonary edema given enlarged PA, trace pleural effusions, interlobular septal thickening, and mediastinal and hilar adenopathy  - Continue diuresis with goal net negative - though patient does appear improved  - Check 2D echo to evaluate for cardiac dysfunction, valvular function, and to r/o pulmonary hypertension  - Repeat CT chest in 4-6 weeks to ensure resolution of opacities  3. Wheezing and cough - overall improved with present treatment  - Can give a trial of bronchodilators  - Outpatient PFTs  - Cessation of vaping will be important for patient's long term health - this was discussed at length and he appears contemplative. He defers nicotine replacement therapy at this time  4. R/O Obstructive Sleep Apnea - patient does snore and would benefit from outpatient evaluation for sleep disorder breathing  - Weight loss  5. DVT proph  6. Polycythemia - noted on labs and may indicate a chronic nocturnal hypoxemia state  - If not improving would consider checking MIGUELITO-2 and Erythrpoietin levels  - Check ambulatory O2 saturation to evaluate for hypoxemia. Will need sleep study as outpatient to evaluate for nocturnal hypoxemia and/or sleep disorder breathing

## 2023-03-11 NOTE — DISCHARGE NOTE PROVIDER - CARE PROVIDER_API CALL
Mallory Dubon)  Internal Medicine; Pulmonary Disease  [address] [place] [address]  Phone: [phone]  Fax: [phone]  Follow Up Time: 1 week    MARY SOTELO  Cardiology  Phone: ()-  Fax: ()-  Follow Up Time: 1 week

## 2023-03-11 NOTE — CONSULT NOTE ADULT - ASSESSMENT
38M non smoker, HEAVY VAPE USE, no pulmonary hx, no PMH admitted for evaluation of SOB. Pulm consulted for SOB, abnormal CT chest, need for BIPAP.     Assessment: SOB / wheezing likely multifactorial in the setting of volume overload. Do not suspect underlying pulmonary disease however has heavy vaping use. Encouraged patient to quit vaping immediately. No fevers. CT chest showing diffuse interlobular septal thickening and central groundglass opacity consistent with pulmonary edema. Now satting well on room air. Patient endorses heavy snoring at home and witnessed apneas by wife. Excessive daytime somnolence.    Plan:  Can discontinue BIPAP  TTE ordered. Patient has cardiac murmur on exam and has volume overload on imaging. Valvular disease?  No further pulmonary evaluation needed inpatient    Patient should follow up with our pulmonary and sleep team after discharge:    This patient will need to close hospital follow up after discharge with the pulmonary team:    Please email: [email or web address] to setup an a close hospital follow up appointment for the patient  prior to discharge with Dr Dubon. The appointment should be within 1 week of discharge from the hospital. Include the patient's name, , MRN, date of discharge and contact information in the email.      Pulmonary/Sleep Clinic  [address] [place] [address]  [phone]    Please discuss the appointment details with the patient and include the appointment details in the patients discharge summary.      38M non smoker, HEAVY VAPE USE, no pulmonary hx, no PMH admitted for evaluation of SOB. Pulm consulted for SOB, abnormal CT chest, need for BIPAP.     Assessment: SOB / wheezing likely multifactorial in the setting of volume overload. Do not suspect underlying pulmonary disease however has heavy vaping use. Encouraged patient to quit vaping immediately. No fevers. CT chest showing diffuse interlobular septal thickening and central groundglass opacity consistent with pulmonary edema. Now satting well on room air. Patient endorses heavy snoring at home and witnessed apneas by wife. Excessive daytime somnolence.    Plan:  Can discontinue BIPAP  TTE ordered. Patient has cardiac murmur on exam and has volume overload on imaging. Valvular disease?  Outpatient evaluation for JEFF - patient does snore  Cessation of vaping will be important for long term health  No further pulmonary evaluation needed inpatient    Patient should follow up with our pulmonary and sleep team after discharge:    This patient will need to close hospital follow up after discharge with the pulmonary team:    Please email: [email or web address] to setup an a close hospital follow up appointment for the patient  prior to discharge with Dr Dubon. The appointment should be within 1 week of discharge from the hospital. Include the patient's name, , MRN, date of discharge and contact information in the email.      Pulmonary/Sleep Clinic  [address] [place] [address]  [phone]    Please discuss the appointment details with the patient and include the appointment details in the patients discharge summary.

## 2023-03-11 NOTE — DISCHARGE NOTE PROVIDER - NSDCFUADDAPPT_GEN_ALL_CORE_FT
Patient should follow up with our pulmonary and sleep team after discharge:  This patient will need to close hospital follow up after discharge with the pulmonary team:  Follow up with Dr Dubon. The appointment should be within 1 week of discharge from the hospital.    Pulmonary/Sleep Clinic  [address] [place] [address]  [phone]

## 2023-03-11 NOTE — CONSULT NOTE ADULT - NS ATTEND AMEND GEN_ALL_CORE FT
38M traveled to [facility] 1/23-2/20/23.   Reportedly had yellow fever early in his stay there.   Here 3/11 for dyspnea in the past week.   Pulmonary edema, cardiomegaly.   Yellow fever can cause myocarditis but there's no role for antiviral therapy.   Other pathogens endemic or unique to [facility] are either unlikely to do this or have no therapeutic options.   Trypanosomiasis can have cardiac involvement but low risk in [facility].     Management per respective providers.   Will sign off, call back if needed   Discussed with medicine    Michael Camara MD   Infectious Disease   Available on TEAMS. After 5PM and on weekends please page fellow on call or call [phone]

## 2023-03-11 NOTE — PROGRESS NOTE ADULT - ASSESSMENT
38  yr year-old male      witn no pmh       presents with shortness of breath, cough, and chest pain .  for  past  week or more   symptoms have been getting progressively worse./  denies  fevers or chills.   pt   [place]  has  his  own  business/  cleaning company       *  c/o  sob  , for   past  week opr  more.  no  cp . with  acute  resp  failure  on  arrival  from chf      hypoxic on arrival.  with sinus tach.  was  on bipap     Ct chest angio, no pe . +.  chf     *  sob,  from acute   diastolic chf ,/  awiating  ef      on iv   lasix       echo pending      WCT .  on torpol, asa/ lipitor        ischemic  w/p,  when stable     on dvt ppx        ra< from: CT Angio Chest PE Protocol w/ IV Cont (03.10.23 @ 15:40) >  MPRESSION:  No pulmonary embolism.  Pulmonary edema with trace pleural effusions in thesetting of   cardiomegaly.  --- End of Report ---  < end of copied text >   38  yr year-old male      witn no pmh       presents with shortness of breath, cough, and chest pain .  for  past  week or more   symptoms have been getting progressively worse./  denies  fevers or chills.   pt   [place]   about [place]  has  his  own  business/  cleaning company       *  c/o  sob  , for   past  week opr  more.  no  cp . with  acute  resp  failure  on  arrival  from chf      hypoxic on arrival.  with sinus tach.  was  on bipap     Ct chest angio, no pe . +.  chf     *  sob,  from acute   diastolic chf ,/  awiating  ef      on iv   lasix    hb  noted       echo pending      WCT .  on torpol, asa/ lipitor /  entresto       ischemic  w/p,  when stable     on dvt ppx        ra< from: CT Angio Chest PE Protocol w/ IV Cont (03.10.23 @ 15:40) >  MPRESSION:  No pulmonary embolism.  Pulmonary edema with trace pleural effusions in thesetting of   cardiomegaly.  --- End of Report ---  < end of copied text >

## 2023-03-11 NOTE — DISCHARGE NOTE PROVIDER - NSDCCPCAREPLAN_GEN_ALL_CORE_FT
PRINCIPAL DISCHARGE DIAGNOSIS  Diagnosis: Heart failure  Assessment and Plan of Treatment: Weigh yourself daily.  If you gain 3lbs in 3 days, or 5lbs in a week call your Health Care Provider.  Do not eat or drink foods containing more than 2000mg of salt (sodium) in your diet every day.  Call your Health Care Provider if you have any swelling or increased swelling in your feet, ankles, and/or stomach.  The Pt was provided with CHF diet instruction (low sodium diet, daily weights, label reading, Heart Healthy Cooking Tips & Heart Healthy shopping Tips).  Take all of your medication as directed.  If you become dizzy call your Health Care Provider.      SECONDARY DISCHARGE DIAGNOSES  Diagnosis: NICM (nonischemic cardiomyopathy)  Assessment and Plan of Treatment: Weigh yourself daily.  If you gain 3lbs in 3 days, or 5lbs in a week call your Health Care Provider.  Do not eat or drink foods containing more than 2000mg of salt (sodium) in your diet every day.  Call your Health Care Provider if you have any swelling or increased swelling in your feet, ankles, and/or stomach.  The Pt was provided with CHF diet instruction (low sodium diet, daily weights, label reading, Heart Healthy Cooking Tips & Heart Healthy shopping Tips).  Take all of your medication as directed.  If you become dizzy call your Health Care Provider.

## 2023-03-11 NOTE — CONSULT NOTE ADULT - ASSESSMENT
38 year old male with no PMH, recent travel to [facility] on January 23 2023 where he endorsed being diagnosed with yellow fever about 1 week into his travels. Of note pt states he travels there yearly and always gets yellow fever. Pt stats there he had fevers for about 3 days and then he felt fine thereafter and continued [place] in [place] usual state of health. He stated that about 1 week prior to admission he started to notice some exertional sob which he ignored and then the night prior to his hospitalization he woke up sob and unable to recover with associated chest pain. He also endorses heavy usage of vaping and that this habit is "out of control". He denies any other associated symptoms. ID consulted    Afebrile  Leukocytosis of 11.2 today, 8.4 on admission  CRP17  HIV antibody NR  RVP neg  Covid neg  SED rate pending  CT scan neg for PE but with Trace pleural effusions. Diffuse interlobular septal thickening and central groundglass opacity consistent with pulmonary edema. No pneumothorax. The central airways are patent. Cardiomegaly  Cxr with b/l hazy opacities    Plan     38 year old male with no PMH, recent travel to [place] on January 23 2023 where he endorsed being diagnosed with yellow fever about 1 week into his travels. Of note pt states he travels there yearly and always gets yellow fever. Pt stats there he had fevers for about 3 days and then he felt fine thereafter and continued his vacation until [place] usual state of health. He stated that about 1 week prior to admission he started to notice some exertional sob which he ignored and then the night prior to his hospitalization he woke up sob and unable to recover with associated chest pain. He also endorses heavy usage of vaping and that this habit is "out of control". He denies any other associated symptoms. ID consulted    Afebrile  Leukocytosis of 11.2 today, 8.4 on admission  CRP17  HIV antibody NR  RVP neg  Covid neg  SED rate pending  CT scan neg for PE but with Trace pleural effusions. Diffuse interlobular septal thickening and central groundglass opacity consistent with pulmonary edema. No pneumothorax. The central airways are patent. Cardiomegaly  Cxr with b/l hazy opacities    Plan  No active infectious etiology noted. Pt appears nontoxic  Monitor off antibiotics  Reconsult PRN     38 year old male with no PMH, recent travel to [place] on January 23 2023 where he endorsed being diagnosed with yellow fever about 1 week into his travels. Of note pt states he travels there yearly and always gets yellow fever. Pt stats there he had fevers for about 3 days and then he felt fine thereafter and continued his [place] usual state of health. He stated that about 1 week prior to admission he started to notice some exertional sob which he ignored and then the night prior to his hospitalization he woke up sob and unable to recover with associated chest pain. He also endorses heavy usage of vaping and that this habit is "out of control". He denies any other associated symptoms. ID consulted    Afebrile  Leukocytosis of 11.2 today, 8.4 on admission  CRP17  HIV antibody NR  RVP neg  Covid neg  SED rate pending  CT scan neg for PE but with Trace pleural effusions. Diffuse interlobular septal thickening and central groundglass opacity consistent with pulmonary edema. No pneumothorax. The central airways are patent. Cardiomegaly  Cxr with b/l hazy opacities

## 2023-03-11 NOTE — CONSULT NOTE ADULT - SUBJECTIVE AND OBJECTIVE BOX
CHIEF COMPLAINT:    HPI per H and P: HPI:    38  yr year-old male      witn no pmh       presents with shortness of breath, cough, and chest pain .  for  past  week or more        His symptoms have been getting progressively worse.   denies  fevers or chills.   pt   [place] [facility]   about 2 weeks ago.   hypoxic on arrival   Ct chest angio, no pe  (10 Mar 2023 18:57)      PAST MEDICAL & SURGICAL HISTORY:  No pertinent past medical history      No significant past surgical history          FAMILY HISTORY:      SOCIAL HISTORY:  Smoking: [x ] Never Smoked  ACTIVE VAPOR OF TOBACCO PRODUCTS  Substance Use: [x] Never Used [ ] Used ____  EtOH Use: not currently  Recent travel: Returned in 02/2023 from [place]    Allergies    No Known Allergies    Intolerances        HOME MEDICATIONS:     ROS  Constitutional: denies fevers, chills, night sweats, weight loss  HEENT: denies visual changes, cough  Cardiovascular: denies palpitations, chest pain, edema  Respiratory: denies SOB, wheezing  Gastrointestinal: denies N/V/D, abdominal pain, hematochezia, melena  : denies dysuria, urinary urgency, increased frequency  MSK: denies muscle weakness, joint pain  Skin: denies new rashes or masses  Heme: denies bleeding, bruising  Neuro: denies headache, weakness    PHYSICAL EXAM:   GEN: Age appropriate, resting comfortably in bed, no acute distress, non toxic appearing, speaking in complete sentences.   HEENT: Conjunctiva and sclera normal  PULM: Lungs CTAB, no wheezes, rales, rhonchi  CV: RRR, S1S2, +MURMUR  MSK: no stiffness or joint effusions  Abdominal: Soft, nontender to palpation, non-distended, +BS  Extremities: No edema or cyanosis  NEURO: AAOx3  Psych: normal affect, normal behavior  Skin: No rashes, lesions      OBJECTIVE:  ICU Vital Signs Last 24 Hrs  T(C): 36.8 (11 Mar 2023 11:15), Max: 36.8 (11 Mar 2023 11:15)  T(F): 98.3 (11 Mar 2023 11:15), Max: 98.3 (11 Mar 2023 11:15)  HR: 87 (11 Mar 2023 16:14) (74 - 100)  BP: 117/80 (11 Mar 2023 13:25) (117/80 - 147/99)  BP(mean): --  ABP: --  ABP(mean): --  RR: 20 (11 Mar 2023 16:14) (18 - 22)  SpO2: 98% (11 Mar 2023 16:14) (93% - 100%)    O2 Parameters below as of 11 Mar 2023 16:14  Patient On (Oxygen Delivery Method): room air              03-10 @ 07:01  -  03-11 @ 07:00  --------------------------------------------------------  IN: 340 mL / OUT: 1900 mL / NET: -1560 mL    03-11 @ 06:01  - 03-11 @ 16:42  --------------------------------------------------------  IN: 480 mL / OUT: 0 mL / NET: 480 mL      CAPILLARY BLOOD GLUCOSE            HOSPITAL MEDICATIONS:  aspirin  chewable 81 milliGRAM(s) Oral daily  heparin   Injectable 5000 Unit(s) SubCutaneous every 12 hours      furosemide   Injectable 40 milliGRAM(s) IV Push daily  metoprolol succinate ER 25 milliGRAM(s) Oral daily  sacubitril 24 mG/valsartan 26 mG 1 Tablet(s) Oral two times a day  spironolactone 12.5 milliGRAM(s) Oral daily    atorvastatin 10 milliGRAM(s) Oral at bedtime                influenza   Vaccine 0.5 milliLiter(s) IntraMuscular once    chlorhexidine 2% Cloths 1 Application(s) Topical daily        LABS:                        17.9   11.26 )-----------( 314      ( 11 Mar 2023 06:20 )             53.8     Hgb Trend: 17.9<--, 17.3<--  03-11    140  |  105  |  11  ----------------------------<  87  3.5   |  22  |  0.91    Ca    8.5      11 Mar 2023 06:17  Mg     2.2     03-11    TPro  6.8  /  Alb  3.8  /  TBili  0.4  /  DBili  x   /  AST  24  /  ALT  41  /  AlkPhos  62  03-10    Creatinine Trend: 0.91<--, 1.06<--  PT/INR - ( 10 Mar 2023 11:43 )   PT: 11.1 sec;   INR: 0.97 ratio         PTT - ( 10 Mar 2023 11:43 )  PTT:29.1 sec    Arterial Blood Gas:  03-10 @ 23:06  7.44/41/88/28/98.2/3.3  ABG lactate: --    Venous Blood Gas:  03-10 @ 11:41  7.38/48/30/28/53.5  VBG Lactate: 1.5             CHIEF COMPLAINT:    HPI per H and P: HPI:    38  yr year-old male      witn no pmh       presents with shortness of breath, cough, and chest pain .  for  past  week or more        His symptoms have been getting progressively worse.   denies  fevers or chills.   pt   [place] [facility]   about 2 weeks ago.   hypoxic on arrival   Ct chest angio, no pe  (10 Mar 2023 18:57)      PAST MEDICAL & SURGICAL HISTORY:  No pertinent past medical history      No significant past surgical history          FAMILY HISTORY:  No reported pertinent family history of lung disease    SOCIAL HISTORY:  Smoking: [x ] Never Smoked  ACTIVE VAPOR OF TOBACCO PRODUCTS  Substance Use: [x] Never Used [ ] Used ____  EtOH Use: not currently  Recent travel: Returned in 02/2023 from [place]    Allergies    No Known Allergies    Intolerances        HOME MEDICATIONS:     ROS  Constitutional: denies fevers, chills, night sweats, weight loss  HEENT: denies visual changes, cough  Cardiovascular: denies palpitations, chest pain, edema  Respiratory: denies SOB, wheezing  Gastrointestinal: denies N/V/D, abdominal pain, hematochezia, melena  : denies dysuria, urinary urgency, increased frequency  MSK: denies muscle weakness, joint pain  Skin: denies new rashes or masses  Heme: denies bleeding, bruising  Neuro: denies headache, weakness    PHYSICAL EXAM:   GEN: Age appropriate, resting comfortably in bed, no acute distress, non toxic appearing, speaking in complete sentences.   HEENT: Conjunctiva and sclera normal  PULM: Lungs CTAB, no wheezes, rales, rhonchi  CV: RRR, S1S2, +MURMUR  MSK: no stiffness or joint effusions  Abdominal: Soft, nontender to palpation, non-distended, +BS  Extremities: No edema or cyanosis  NEURO: AAOx3  Psych: normal affect, normal behavior  Skin: No rashes, lesions      OBJECTIVE:  ICU Vital Signs Last 24 Hrs  T(C): 36.8 (11 Mar 2023 11:15), Max: 36.8 (11 Mar 2023 11:15)  T(F): 98.3 (11 Mar 2023 11:15), Max: 98.3 (11 Mar 2023 11:15)  HR: 87 (11 Mar 2023 16:14) (74 - 100)  BP: 117/80 (11 Mar 2023 13:25) (117/80 - 147/99)  BP(mean): --  ABP: --  ABP(mean): --  RR: 20 (11 Mar 2023 16:14) (18 - 22)  SpO2: 98% (11 Mar 2023 16:14) (93% - 100%)    O2 Parameters below as of 11 Mar 2023 16:14  Patient On (Oxygen Delivery Method): room air              03-10 @ 07:01  - 03-11 @ 07:00  --------------------------------------------------------  IN: 340 mL / OUT: 1900 mL / NET: -1560 mL    03-11 @ 06:01  -  03-11 @ 16:42  --------------------------------------------------------  IN: 480 mL / OUT: 0 mL / NET: 480 mL      CAPILLARY BLOOD GLUCOSE            HOSPITAL MEDICATIONS:  aspirin  chewable 81 milliGRAM(s) Oral daily  heparin   Injectable 5000 Unit(s) SubCutaneous every 12 hours      furosemide   Injectable 40 milliGRAM(s) IV Push daily  metoprolol succinate ER 25 milliGRAM(s) Oral daily  sacubitril 24 mG/valsartan 26 mG 1 Tablet(s) Oral two times a day  spironolactone 12.5 milliGRAM(s) Oral daily    atorvastatin 10 milliGRAM(s) Oral at bedtime                influenza   Vaccine 0.5 milliLiter(s) IntraMuscular once    chlorhexidine 2% Cloths 1 Application(s) Topical daily        LABS:                        17.9   11.26 )-----------( 314      ( 11 Mar 2023 06:20 )             53.8     Hgb Trend: 17.9<--, 17.3<--  03-11    140  |  105  |  11  ----------------------------<  87  3.5   |  22  |  0.91    Ca    8.5      11 Mar 2023 06:17  Mg     2.2     03-11    TPro  6.8  /  Alb  3.8  /  TBili  0.4  /  DBili  x   /  AST  24  /  ALT  41  /  AlkPhos  62  03-10    Creatinine Trend: 0.91<--, 1.06<--  PT/INR - ( 10 Mar 2023 11:43 )   PT: 11.1 sec;   INR: 0.97 ratio         PTT - ( 10 Mar 2023 11:43 )  PTT:29.1 sec    Arterial Blood Gas:  03-10 @ 23:06  7.44/41/88/28/98.2/3.3  ABG lactate: --    Venous Blood Gas:  03-10 @ 11:41  7.38/48/30/28/53.5  VBG Lactate: 1.5

## 2023-03-11 NOTE — DISCHARGE NOTE PROVIDER - NSFOLLOWUPCLINICS_GEN_ALL_ED_FT
[facility]  Cardiology  [address] [place] [id number]  Phone: [phone]  Fax:

## 2023-03-11 NOTE — DISCHARGE NOTE PROVIDER - NSDCMRMEDTOKEN_GEN_ALL_CORE_FT
aspirin 81 mg oral tablet, chewable: 1 tab(s) orally once a day  atorvastatin 20 mg oral tablet: 1 tab(s) orally once a day (at bedtime)  furosemide 40 mg oral tablet: 1 tab(s) orally once a day  Metoprolol Tartrate 25 mg oral tablet: 0.5 tab(s) orally 2 times a day   spironolactone 50 mg oral tablet: 0.5 tab(s) orally 2 times a day   valsartan 40 mg oral tablet: 1 tab(s) orally 2 times a day

## 2023-03-12 LAB
ANION GAP SERPL CALC-SCNC: 11 MMOL/L — SIGNIFICANT CHANGE UP (ref 5–17)
BUN SERPL-MCNC: 19 MG/DL — SIGNIFICANT CHANGE UP (ref 7–23)
CALCIUM SERPL-MCNC: 8.4 MG/DL — SIGNIFICANT CHANGE UP (ref 8.4–10.5)
CHLORIDE SERPL-SCNC: 109 MMOL/L — HIGH (ref 96–108)
CK MB CFR SERPL CALC: 1.4 NG/ML — SIGNIFICANT CHANGE UP (ref 0–6.7)
CK SERPL-CCNC: 81 U/L — SIGNIFICANT CHANGE UP (ref 30–200)
CO2 SERPL-SCNC: 19 MMOL/L — LOW (ref 22–31)
CREAT SERPL-MCNC: 1.11 MG/DL — SIGNIFICANT CHANGE UP (ref 0.5–1.3)
EGFR: 87 ML/MIN/1.73M2 — SIGNIFICANT CHANGE UP
GLUCOSE SERPL-MCNC: 104 MG/DL — HIGH (ref 70–99)
HCT VFR BLD CALC: 54.4 % — HIGH (ref 39–50)
HGB BLD-MCNC: 18.3 G/DL — HIGH (ref 13–17)
MCHC RBC-ENTMCNC: 29.7 PG — SIGNIFICANT CHANGE UP (ref 27–34)
MCHC RBC-ENTMCNC: 33.6 GM/DL — SIGNIFICANT CHANGE UP (ref 32–36)
MCV RBC AUTO: 88.2 FL — SIGNIFICANT CHANGE UP (ref 80–100)
MRSA PCR RESULT.: SIGNIFICANT CHANGE UP
NRBC # BLD: 0 /100 WBCS — SIGNIFICANT CHANGE UP (ref 0–0)
PHOSPHATE SERPL-MCNC: 3.3 MG/DL — SIGNIFICANT CHANGE UP (ref 2.5–4.5)
PLATELET # BLD AUTO: 294 K/UL — SIGNIFICANT CHANGE UP (ref 150–400)
POTASSIUM SERPL-MCNC: 3.6 MMOL/L — SIGNIFICANT CHANGE UP (ref 3.5–5.3)
POTASSIUM SERPL-SCNC: 3.6 MMOL/L — SIGNIFICANT CHANGE UP (ref 3.5–5.3)
RBC # BLD: 6.17 M/UL — HIGH (ref 4.2–5.8)
RBC # FLD: 12.9 % — SIGNIFICANT CHANGE UP (ref 10.3–14.5)
S AUREUS DNA NOSE QL NAA+PROBE: SIGNIFICANT CHANGE UP
SODIUM SERPL-SCNC: 139 MMOL/L — SIGNIFICANT CHANGE UP (ref 135–145)
TROPONIN T, HIGH SENSITIVITY RESULT: 19 NG/L — SIGNIFICANT CHANGE UP (ref 0–51)
TSH SERPL-MCNC: 2.25 UIU/ML — SIGNIFICANT CHANGE UP (ref 0.27–4.2)
WBC # BLD: 7.4 K/UL — SIGNIFICANT CHANGE UP (ref 3.8–10.5)
WBC # FLD AUTO: 7.4 K/UL — SIGNIFICANT CHANGE UP (ref 3.8–10.5)

## 2023-03-12 PROCEDURE — 93306 TTE W/DOPPLER COMPLETE: CPT | Mod: 26

## 2023-03-12 PROCEDURE — 93010 ELECTROCARDIOGRAM REPORT: CPT

## 2023-03-12 PROCEDURE — G0452: CPT | Mod: 26

## 2023-03-12 RX ORDER — MORPHINE SULFATE 50 MG/1
2 CAPSULE, EXTENDED RELEASE ORAL ONCE
Refills: 0 | Status: DISCONTINUED | OUTPATIENT
Start: 2023-03-12 | End: 2023-03-12

## 2023-03-12 RX ORDER — POTASSIUM CHLORIDE 20 MEQ
40 PACKET (EA) ORAL ONCE
Refills: 0 | Status: COMPLETED | OUTPATIENT
Start: 2023-03-12 | End: 2023-03-12

## 2023-03-12 RX ORDER — ATORVASTATIN CALCIUM 80 MG/1
20 TABLET, FILM COATED ORAL AT BEDTIME
Refills: 0 | Status: DISCONTINUED | OUTPATIENT
Start: 2023-03-12 | End: 2023-03-14

## 2023-03-12 RX ORDER — SPIRONOLACTONE 25 MG/1
25 TABLET, FILM COATED ORAL DAILY
Refills: 0 | Status: DISCONTINUED | OUTPATIENT
Start: 2023-03-12 | End: 2023-03-14

## 2023-03-12 RX ADMIN — MORPHINE SULFATE 2 MILLIGRAM(S): 50 CAPSULE, EXTENDED RELEASE ORAL at 20:15

## 2023-03-12 RX ADMIN — SACUBITRIL AND VALSARTAN 1 TABLET(S): 24; 26 TABLET, FILM COATED ORAL at 17:33

## 2023-03-12 RX ADMIN — ATORVASTATIN CALCIUM 20 MILLIGRAM(S): 80 TABLET, FILM COATED ORAL at 21:11

## 2023-03-12 RX ADMIN — CHLORHEXIDINE GLUCONATE 1 APPLICATION(S): 213 SOLUTION TOPICAL at 13:05

## 2023-03-12 RX ADMIN — Medication 40 MILLIEQUIVALENT(S): at 19:59

## 2023-03-12 RX ADMIN — Medication 40 MILLIGRAM(S): at 05:28

## 2023-03-12 RX ADMIN — SPIRONOLACTONE 12.5 MILLIGRAM(S): 25 TABLET, FILM COATED ORAL at 05:28

## 2023-03-12 RX ADMIN — SACUBITRIL AND VALSARTAN 1 TABLET(S): 24; 26 TABLET, FILM COATED ORAL at 05:28

## 2023-03-12 RX ADMIN — MORPHINE SULFATE 2 MILLIGRAM(S): 50 CAPSULE, EXTENDED RELEASE ORAL at 20:00

## 2023-03-12 RX ADMIN — Medication 81 MILLIGRAM(S): at 12:00

## 2023-03-12 RX ADMIN — Medication 25 MILLIGRAM(S): at 05:28

## 2023-03-12 NOTE — PROGRESS NOTE ADULT - SUBJECTIVE AND OBJECTIVE BOX
CARDIOLOGY     PROGRESS  NOTE   ________________________________________________    CHIEF COMPLAINT:Patient is a 38y old  Male who presents with a chief complaint of sob/cp (12 Mar 2023 10:11)  doing better  	  REVIEW OF SYSTEMS:  CONSTITUTIONAL: No fever, weight loss, or fatigue  EYES: No eye pain, visual disturbances, or discharge  ENT:  No difficulty hearing, tinnitus, vertigo; No sinus or throat pain  NECK: No pain or stiffness  RESPIRATORY: No cough, wheezing, chills or hemoptysis; No Shortness of Breath  CARDIOVASCULAR: No chest pain, palpitations, passing out, dizziness, or leg swelling  GASTROINTESTINAL: No abdominal or epigastric pain. No nausea, vomiting, or hematemesis; No diarrhea or constipation. No melena or hematochezia.  GENITOURINARY: No dysuria, frequency, hematuria, or incontinence  NEUROLOGICAL: No headaches, memory loss, loss of strength, numbness, or tremors  SKIN: No itching, burning, rashes, or lesions   LYMPH Nodes: No enlarged glands  ENDOCRINE: No heat or cold intolerance; No hair loss  MUSCULOSKELETAL: No joint pain or swelling; No muscle, back, or extremity pain  PSYCHIATRIC: No depression, anxiety, mood swings, or difficulty sleeping  HEME/LYMPH: No easy bruising, or bleeding gums  ALLERGY AND IMMUNOLOGIC: No hives or eczema	    [ x] All others negative	  [ ] Unable to obtain    PHYSICAL EXAM:  T(C): 36.5 (03-12-23 @ 04:32), Max: 36.5 (03-12-23 @ 04:32)  HR: 84 (03-12-23 @ 04:32) (84 - 93)  BP: 110/70 (03-12-23 @ 04:32) (110/70 - 121/82)  RR: 18 (03-12-23 @ 04:32) (18 - 20)  SpO2: 93% (03-12-23 @ 04:32) (93% - 98%)  Wt(kg): --  I&O's Summary    11 Mar 2023 06:01  -  12 Mar 2023 07:00  --------------------------------------------------------  IN: 500 mL / OUT: 300 mL / NET: 200 mL    12 Mar 2023 07:01  -  12 Mar 2023 11:35  --------------------------------------------------------  IN: 200 mL / OUT: 1200 mL / NET: -1000 mL        Appearance: Normal	  HEENT:   Normal oral mucosa, PERRL, EOMI	  Lymphatic: No lymphadenopathy  Cardiovascular: Normal S1 S2, No JVD, + murmurs, No edema  Respiratory: rhonchi  Psychiatry: A & O x 3, Mood & affect appropriate  Gastrointestinal:  Soft, Non-tender, + BS	  Skin: No rashes, No ecchymoses, No cyanosis	  Neurologic: Non-focal  Extremities: Normal range of motion, No clubbing, cyanosis or edema  Vascular: Peripheral pulses palpable 2+ bilaterally    MEDICATIONS  (STANDING):  aspirin  chewable 81 milliGRAM(s) Oral daily  atorvastatin 10 milliGRAM(s) Oral at bedtime  chlorhexidine 2% Cloths 1 Application(s) Topical daily  furosemide   Injectable 40 milliGRAM(s) IV Push daily  heparin   Injectable 5000 Unit(s) SubCutaneous every 12 hours  influenza   Vaccine 0.5 milliLiter(s) IntraMuscular once  metoprolol succinate ER 25 milliGRAM(s) Oral daily  sacubitril 24 mG/valsartan 26 mG 1 Tablet(s) Oral two times a day  spironolactone 12.5 milliGRAM(s) Oral daily      TELEMETRY: 	    ECG:  	  RADIOLOGY:  OTHER: 	  	  LABS:	 	    CARDIAC MARKERS:                                18.3   7.40  )-----------( 294      ( 12 Mar 2023 06:39 )             54.4     03-12    139  |  109<H>  |  19  ----------------------------<  104<H>  3.6   |  19<L>  |  1.11    Ca    8.4      12 Mar 2023 06:39  Mg     2.2     03-11    TPro  6.8  /  Alb  3.8  /  TBili  0.4  /  DBili  x   /  AST  24  /  ALT  41  /  AlkPhos  62  03-10    proBNP:   Lipid Profile: Cholesterol 241  LDL --  HDL 68      HgA1c:   TSH: Thyroid Stimulating Hormone, Serum: 2.25 uIU/mL (03-12 @ 06:39)  Thyroid Stimulating Hormone, Serum: 0.36 uIU/mL (03-11 @ 06:17)    PT/INR - ( 10 Mar 2023 11:43 )   PT: 11.1 sec;   INR: 0.97 ratio         PTT - ( 10 Mar 2023 11:43 )  PTT:29.1 sec      Assessment and plan  ---------------------------  38 year old male with no PMH, recent travel to [place] 1 month ago where he was diagnosed with ?yellow fever, presents to the ED with 1 week of cough, now progressively worsening sharp midsternal chest pain worse with inspiration and with coughing, and shortness of breath. Denies fever, congestion, nausea, vomiting, diarrhea, abdominal pain. Hasn't taken anything for symptoms.  chf/ pulmonary edema ?etiology  echo  esr/ crp  hiv  iv lasix  tsh  taper o2 as tolerated  strict i/o  started on Entresto  6 beats of wct add metoprolol er 25 mg daily  awaiting echo/ ID eval r/i infectious cause of cardiomyopathy from [place]  add aldactone 12.5 mg daily  cardiac mri, still awaiting echo  increase lipitor to 20 mg po qhs

## 2023-03-12 NOTE — PROGRESS NOTE ADULT - SUBJECTIVE AND OBJECTIVE BOX
afebrile  REVIEW OF SYSTEMS:  CONSTITUTIONAL: No fever,  no  weight loss  ENT:  No  tinnitus,   no   vertigo  NECK: No pain or stiffness  RESPIRATORY: No cough, wheezing, chills or hemoptysis;    No Shortness of Breath  CARDIOVASCULAR: No chest pain, palpitations, dizziness  GASTROINTESTINAL: No abdominal or epigastric pain. No nausea, vomiting, or hematemesis; No diarrhea  No melena or hematochezia.  GENITOURINARY: No dysuria, frequency, hematuria, or incontinence  NEUROLOGICAL: No headaches  SKIN: No itching,  no   rash  LYMPH Nodes: No enlarged glands  ENDOCRINE: No heat or cold intolerance  MUSCULOSKELETAL: No joint pain or swelling  PSYCHIATRIC: No depression, anxiety  HEME/LYMPH: No easy bruising, or bleeding gums  ALLERGY AND IMMUNOLOGIC: No hives or eczema	    MEDICATIONS  (STANDING):  aspirin  chewable 81 milliGRAM(s) Oral daily  atorvastatin 10 milliGRAM(s) Oral at bedtime  chlorhexidine 2% Cloths 1 Application(s) Topical daily  furosemide   Injectable 40 milliGRAM(s) IV Push daily  heparin   Injectable 5000 Unit(s) SubCutaneous every 12 hours  influenza   Vaccine 0.5 milliLiter(s) IntraMuscular once  metoprolol succinate ER 25 milliGRAM(s) Oral daily  sacubitril 24 mG/valsartan 26 mG 1 Tablet(s) Oral two times a day  spironolactone 12.5 milliGRAM(s) Oral daily    MEDICATIONS  (PRN):      Vital Signs Last 24 Hrs  T(C): 36.5 (12 Mar 2023 04:32), Max: 36.8 (11 Mar 2023 11:15)  T(F): 97.7 (12 Mar 2023 04:32), Max: 98.3 (11 Mar 2023 11:15)  HR: 84 (12 Mar 2023 04:32) (81 - 99)  BP: 110/70 (12 Mar 2023 04:32) (110/70 - 121/82)  BP(mean): --  RR: 18 (12 Mar 2023 04:32) (18 - 20)  SpO2: 93% (12 Mar 2023 04:32) (93% - 98%)    Parameters below as of 12 Mar 2023 04:32  Patient On (Oxygen Delivery Method): room air      CAPILLARY BLOOD GLUCOSE        I&O's Summary    11 Mar 2023 06:01  -  12 Mar 2023 07:00  --------------------------------------------------------  IN: 500 mL / OUT: 300 mL / NET: 200 mL    12 Mar 2023 07:01  -  12 Mar 2023 10:12  --------------------------------------------------------  IN: 200 mL / OUT: 1200 mL / NET: -1000 mL          Appearance: Normal	  HEENT:   Normal oral mucosa, PERRL, EOMI	  Lymphatic: No lymphadenopathy  Cardiovascular: Normal S1 S2, No JVD  Respiratory: Lungs clear to auscultation	  Psychiatry: A & O x 3, Mood & affect appropriate  Gastrointestinal:  Soft, Non-tender, + BS	  Skin: No rash, No ecchymoses	  Extremities: Normal range of motion  Vascular: Peripheral pulses palpable bilaterally    LABS:                        18.3   7.40  )-----------( 294      ( 12 Mar 2023 06:39 )             54.4     03-12    139  |  109<H>  |  19  ----------------------------<  104<H>  3.6   |  19<L>  |  1.11    Ca    8.4      12 Mar 2023 06:39  Mg     2.2     03-11    TPro  6.8  /  Alb  3.8  /  TBili  0.4  /  DBili  x   /  AST  24  /  ALT  41  /  AlkPhos  62  03-10    PT/INR - ( 10 Mar 2023 11:43 )   PT: 11.1 sec;   INR: 0.97 ratio         PTT - ( 10 Mar 2023 11:43 )  PTT:29.1 sec          ABG - ( 10 Mar 2023 23:06 )  pH, Arterial: 7.44  pH, Blood: x     /  pCO2: 41    /  pO2: 88    / HCO3: 28    / Base Excess: 3.3   /  SaO2: 98.2              03-10 @ 11:41  3.6  30      Thyroid Stimulating Hormone, Serum: 0.36 uIU/mL (03-11 @ 06:17)          Consultant(s) Notes Reviewed:      Care Discussed with Consultants/Other Providers:

## 2023-03-12 NOTE — PROGRESS NOTE ADULT - ASSESSMENT
38  yr year-old male      witn no pmh       presents with shortness of breath, cough, and chest pain .  for  past  week or more   symptoms have been getting progressively worse./  denies  fevers or chills.   pt   [place]   [place]  has  his  own  business/  cleaning company       *  c/o  sob  , for   past  week opr  more.  no  cp . with  acute  resp  failure  on  arrival  from chf      hypoxic on arrival.  with sinus tach.  was  on bipap     Ct chest angio, no pe . +.  chf     *  sob,  from acute   diastolic chf       on iv   lasix      s/p  WCT .  on torpol, asa/ lipitor /  entresto       ischemic  w/p,  when stable    awiating  echo   Hb  is  18/  heme eval/  Ronen 2 ordered/ ?  secondary  polycythemia     on dvt ppx        ra< from: CT Angio Chest PE Protocol w/ IV Cont (03.10.23 @ 15:40) >  MPRESSION:  No pulmonary embolism.  Pulmonary edema with trace pleural effusions in thesetting of   cardiomegaly.  --- End of Report ---  < end of copied text >

## 2023-03-12 NOTE — CHART NOTE - NSCHARTNOTEFT_GEN_A_CORE
Called  by  RN  that  pt c/o chest  pain  5/10.  Pt was  seen  an  evaluated, pt  states  the  pain  onset  15 minutes prior  that  he  was  ambulating  on the  floor  when  the  pain  initiated.  He  describe  that  pain  as  a  sharp  pain  at  his  left  chest  pectoral  area,  non  radiating.   He  denies  any  dizziness,  lightheadedness,  sob  or  palpitation.  Pt  did  have  11 bts  of  WCT  on   tele.  Pt  K-3.6.    Patient is a 38y old  Male who presents with a chief complaint of sob/cp (12 Mar 2023 11:34)      Vital Signs Last 24 Hrs  T(C): 36.8 (12 Mar 2023 14:33), Max: 36.8 (12 Mar 2023 14:33)  T(F): 98.3 (12 Mar 2023 14:33), Max: 98.3 (12 Mar 2023 14:33)  HR: 93 (12 Mar 2023 19:24) (84 - 99)  BP: 111/77 (12 Mar 2023 19:24) (110/70 - 134/92)  BP(mean): --  RR: 18 (12 Mar 2023 19:24) (18 - 20)  SpO2: 95% (12 Mar 2023 19:24) (93% - 95%)    Parameters below as of 12 Mar 2023 19:24  Patient On (Oxygen Delivery Method): room air          Labs:                          18.3   7.40  )-----------( 294      ( 12 Mar 2023 06:39 )             54.4     03-12    139  |  109<H>  |  19  ----------------------------<  104<H>  3.6   |  19<L>  |  1.11    Ca    8.4      12 Mar 2023 06:39  Mg     2.2     03-11      ABG - ( 10 Mar 2023 23:06 )  pH, Arterial: 7.44  pH, Blood: x     /  pCO2: 41    /  pO2: 88    / HCO3: 28    / Base Excess: 3.3   /  SaO2: 98.2      Constitutional: No fever, fatigue or weight loss.  Skin: No rash.  Eyes: No recent vision problems or eye pain.  ENT: No congestion, ear pain, or sore throat.  Endocrine: No thyroid problems.  Cardiovascular: + chest pain or palpation.  Respiratory: No cough, shortness of breath, congestion, or wheezing.  Gastrointestinal: No abdominal pain, nausea, vomiting, or diarrhea.  Genitourinary: No dysuria.  Musculoskeletal: No joint swelling.  Neurologic: No headache.      Physical Exam:  General: WN/WD NAD  Neurology: A&Ox3, nonfocal, COPELAND x 4  Head:  Normocephalic, atraumatic  Respiratory: CTA B/L  CV: RRR, S1S2, no murmur  Abdominal: Soft, NT, ND no palpable mass  MSK: No edema, + peripheral pulses, FROM all 4 extremity    Assessment & Plan:  HPI:   38  yr year-old male    with no pmhx   presents with shortness of breath, cough, and chest pain .  for  past  week or more his symptoms have been getting progressively worse. Now  with  chest  pain  that  onset  15mins  after  ambulating  .    Pt  denies  fevers or chills.    CP  R/O  ACS VS  Cardiomyopathy       >  Echo  Pending    > Stat   Cardiac  Enzymes/ Phos  level    >  Potassium  40  meq  x1  dose  supplemented    > Stat  EKG  showed  no acute  changes   from  3/10  >  Morphine  2mg  IVP  X1  dose    >  Discussed  plan  with  Dr Beverly  who  agrees  with  plan  at this  time.          Follow up with Attending in AM.

## 2023-03-13 DIAGNOSIS — I50.21 ACUTE SYSTOLIC (CONGESTIVE) HEART FAILURE: ICD-10-CM

## 2023-03-13 LAB
HCT VFR BLD CALC: 52.7 % — HIGH (ref 39–50)
HGB BLD-MCNC: 17.7 G/DL — HIGH (ref 13–17)
MCHC RBC-ENTMCNC: 29.3 PG — SIGNIFICANT CHANGE UP (ref 27–34)
MCHC RBC-ENTMCNC: 33.6 GM/DL — SIGNIFICANT CHANGE UP (ref 32–36)
MCV RBC AUTO: 87.1 FL — SIGNIFICANT CHANGE UP (ref 80–100)
NRBC # BLD: 0 /100 WBCS — SIGNIFICANT CHANGE UP (ref 0–0)
PLATELET # BLD AUTO: 308 K/UL — SIGNIFICANT CHANGE UP (ref 150–400)
RBC # BLD: 6.05 M/UL — HIGH (ref 4.2–5.8)
RBC # FLD: 12.8 % — SIGNIFICANT CHANGE UP (ref 10.3–14.5)
WBC # BLD: 5.9 K/UL — SIGNIFICANT CHANGE UP (ref 3.8–10.5)
WBC # FLD AUTO: 5.9 K/UL — SIGNIFICANT CHANGE UP (ref 3.8–10.5)

## 2023-03-13 PROCEDURE — 99152 MOD SED SAME PHYS/QHP 5/>YRS: CPT

## 2023-03-13 PROCEDURE — 99223 1ST HOSP IP/OBS HIGH 75: CPT

## 2023-03-13 PROCEDURE — 93456 R HRT CORONARY ARTERY ANGIO: CPT | Mod: 26

## 2023-03-13 RX ORDER — FUROSEMIDE 40 MG
40 TABLET ORAL
Refills: 0 | Status: DISCONTINUED | OUTPATIENT
Start: 2023-03-13 | End: 2023-03-14

## 2023-03-13 RX ADMIN — SACUBITRIL AND VALSARTAN 1 TABLET(S): 24; 26 TABLET, FILM COATED ORAL at 05:36

## 2023-03-13 RX ADMIN — CHLORHEXIDINE GLUCONATE 1 APPLICATION(S): 213 SOLUTION TOPICAL at 14:15

## 2023-03-13 RX ADMIN — Medication 25 MILLIGRAM(S): at 05:36

## 2023-03-13 RX ADMIN — ATORVASTATIN CALCIUM 20 MILLIGRAM(S): 80 TABLET, FILM COATED ORAL at 21:30

## 2023-03-13 RX ADMIN — Medication 81 MILLIGRAM(S): at 12:35

## 2023-03-13 RX ADMIN — SACUBITRIL AND VALSARTAN 1 TABLET(S): 24; 26 TABLET, FILM COATED ORAL at 18:32

## 2023-03-13 RX ADMIN — Medication 40 MILLIGRAM(S): at 05:35

## 2023-03-13 RX ADMIN — SPIRONOLACTONE 25 MILLIGRAM(S): 25 TABLET, FILM COATED ORAL at 05:36

## 2023-03-13 RX ADMIN — Medication 40 MILLIGRAM(S): at 14:14

## 2023-03-13 NOTE — CONSULT NOTE ADULT - SUBJECTIVE AND OBJECTIVE BOX
HPI:    38  yr year-old male      witn no pmh       presents with shortness of breath, cough, and chest pain .  for  past  week or more        His symptoms have been getting progressively worse.   denies  fevers or chills.   pt   [place]   about 2 weeks ago.   hypoxic on arrival   Ct chest angio, no pe  (10 Mar 2023 18:57)    PAST MEDICAL & SURGICAL HISTORY:  No pertinent past medical history      No significant past surgical history          No Known Allergies      FAMILY HISTORY:    Social History:    Medications:  aspirin  chewable 81 milliGRAM(s) Oral daily  atorvastatin 20 milliGRAM(s) Oral at bedtime  chlorhexidine 2% Cloths 1 Application(s) Topical daily  furosemide   Injectable 40 milliGRAM(s) IV Push two times a day  heparin   Injectable 5000 Unit(s) SubCutaneous every 12 hours  influenza   Vaccine 0.5 milliLiter(s) IntraMuscular once  Hematocrit: 52.7 % (03.13.23 @ 06:15)   Hematocrit: 54.4 % (03.12.23 @ 06:39)   Hematocrit: 53.8 % (03.11.23 @ 06:20)   Hematocrit: 53.0 % (03.10.23 @ 11:43) metoprolol succinate ER 25 milliGRAM(s) Oral daily  sacubitril 24 mG/valsartan 26 mG 1 Tablet(s) Oral two times a day  spironolactone 25 milliGRAM(s) Oral daily    Labs:                        17.7   5.90  )-----------( 308      ( 13 Mar 2023 06:15 )             52.7     Hemoglobin: 17.7 g/dL (03.13.23 @ 06:15)   Hemoglobin: 18.3 g/dL (03.12.23 @ 06:39)   Hemoglobin: 17.9 g/dL (03.11.23 @ 06:20)   Hemoglobin: 17.3 g/dL (03.10.23 @ 11:43)   Hematocrit: 52.7 % (03.13.23 @ 06:15)   Hematocrit: 54.4 % (03.12.23 @ 06:39)   Hematocrit: 53.8 % (03.11.23 @ 06:20)   Hematocrit: 53.0 % (03.10.23 @ 11:43)   03-12    139  |  109<H>  |  19  ----------------------------<  104<H>  3.6   |  19<L>  |  1.11    Ca    8.4      12 Mar 2023 06:39  Phos  3.3     03-12      Radiology:     < from: CT Angio Chest PE Protocol w/ IV Cont (03.10.23 @ 15:40) >  IMPRESSION:  No pulmonary embolism.    Pulmonary edema with trace pleural effusions in thesetting of   cardiomegaly.    < end of copied text >          ROS:  No pain, no fever  No lumps in neck or pain  No Sob or chest pain  No palpitations   No abdominal pain. No change in bowel habit. No blood in stools  No weakness in extremities  No leg swelling  Rest of comprehensive ROS was negative    Vital Signs Last 24 Hrs  T(C): 36.7 (13 Mar 2023 04:00), Max: 36.8 (12 Mar 2023 14:33)  T(F): 98 (13 Mar 2023 04:00), Max: 98.3 (12 Mar 2023 14:33)  HR: 82 (13 Mar 2023 05:34) (82 - 99)  BP: 111/75 (13 Mar 2023 05:34) (101/76 - 134/92)  BP(mean): --  RR: 18 (13 Mar 2023 04:00) (18 - 18)  SpO2: 94% (13 Mar 2023 04:00) (93% - 97%)    Parameters below as of 13 Mar 2023 04:00  Patient On (Oxygen Delivery Method): room air        Physical Exam:  Patient comfortable  AXOX3  Neck supple, no LN's  Chest: bilateral breath sounds, no wheeze or rales  CVS: regular heart rate without murmur  Abdomen: soft, BS+, no massess or organomegaly  CNS: no gross deficit  Musculoskeletal: Normal range of motion  Skin: no rash       HPI:  38  yr year-old male glory no pmh admitted 3/10/23 with shortness of breath, cough, and chest pain for 1 week or more   His symptoms had been getting progressively worse.  Denied  fevers or chills.  [place] the [place] back from [facility] about 2 weeks PTA.  Ct chest angio, no PE   Denies any prior medical history. Reports growing up without any medical issues. He has brothers and sisters who are all healthy. Does not have children. No known medical issues with his parents or other relatives.  Drinks 6 shots of vodka daily. Vapes several times a day (nicotine). Denies illicit drug use.   Being managed for CHF or ? etiology.  Gives h/o heavy snoring at home and witnessed apneas by wife. Excessive daytime somnolence.heavy snoring at home and witnessed apneas by wife. Excessive daytime somnolence.  Seen for hemoglobin on higher side  PAST MEDICAL & SURGICAL HISTORY:  No pertinent past medical history      No significant past surgical history          No Known Allergies      FAMILY HISTORY:    Social History: Smoking History: vapes daily  Alcohol Use: 6 shots vodka daily  Drug Use: denies     Medications:  aspirin  chewable 81 milliGRAM(s) Oral daily  atorvastatin 20 milliGRAM(s) Oral at bedtime  chlorhexidine 2% Cloths 1 Application(s) Topical daily  furosemide   Injectable 40 milliGRAM(s) IV Push two times a day  heparin   Injectable 5000 Unit(s) SubCutaneous every 12 hours  influenza   Vaccine 0.5 milliLiter(s) IntraMuscular once  metoprolol succinate ER 25 milliGRAM(s) Oral daily  sacubitril 24 mG/valsartan 26 mG 1 Tablet(s) Oral two times a day  spironolactone 25 milliGRAM(s) Oral daily    Labs:                        17.7   5.90  )-----------( 308      ( 13 Mar 2023 06:15 )             52.7     Hemoglobin: 17.7 g/dL (03.13.23 @ 06:15)   Hemoglobin: 18.3 g/dL (03.12.23 @ 06:39)   Hemoglobin: 17.9 g/dL (03.11.23 @ 06:20)   Hemoglobin: 17.3 g/dL (03.10.23 @ 11:43)   Hematocrit: 52.7 % (03.13.23 @ 06:15)   Hematocrit: 54.4 % (03.12.23 @ 06:39)   Hematocrit: 53.8 % (03.11.23 @ 06:20)   Hematocrit: 53.0 % (03.10.23 @ 11:43)   03-12    139  |  109<H>  |  19  ----------------------------<  104<H>  3.6   |  19<L>  |  1.11    Ca    8.4      12 Mar 2023 06:39  Phos  3.3     03-12      Radiology:     < from: CT Angio Chest PE Protocol w/ IV Cont (03.10.23 @ 15:40) >  IMPRESSION:  No pulmonary embolism.    Pulmonary edema with trace pleural effusions in thesetting of   cardiomegaly.    < end of copied text >          ROS:  No pain, no fever  No lumps in neck or pain  No Sob or chest pain  No palpitations   No abdominal pain. No change in bowel habit. No blood in stools  No weakness in extremities  No leg swelling  Rest of comprehensive ROS was negative    Vital Signs Last 24 Hrs  T(C): 36.7 (13 Mar 2023 04:00), Max: 36.8 (12 Mar 2023 14:33)  T(F): 98 (13 Mar 2023 04:00), Max: 98.3 (12 Mar 2023 14:33)  HR: 82 (13 Mar 2023 05:34) (82 - 99)  BP: 111/75 (13 Mar 2023 05:34) (101/76 - 134/92)  BP(mean): --  RR: 18 (13 Mar 2023 04:00) (18 - 18)  SpO2: 94% (13 Mar 2023 04:00) (93% - 97%)    Parameters below as of 13 Mar 2023 04:00  Patient On (Oxygen Delivery Method): room air        Physical Exam:  Patient comfortable  AXOX3  Neck supple, no LN's  Chest: bilateral breath sounds, no wheeze or rales  CVS: regular heart rate without murmur  Abdomen: soft, BS+, no massess or organomegaly  CNS: no gross deficit  Musculoskeletal: Normal range of motion  Skin: no rash

## 2023-03-13 NOTE — CONSULT NOTE ADULT - ASSESSMENT
High hb from sleep apnea. if increases further can do phlebotoy 38  yr year-old male with no pmh admitted 3/10/23 with shortness of breath, cough, and chest pain for 1 week or more   His symptoms had been getting progressively worse.  Denied  fevers or chills.  [place] flew [place] the [place] back [place] [facility] about 2 weeks PTA.  Ct chest angio, no PE   Denies any prior medical history. Reports growing up without any medical issues. He has brothers and sisters who are all healthy. Does not have children. No known medical issues with his parents or other relatives.  Drinks 6 shots of vodka daily. Vapes several times a day (nicotine). Denies illicit drug use.   Being managed for CHF or ? etiology.  Gives h/o heavy snoring at home and witnessed apneas by wife. Excessive daytime somnolence. heavy snoring at home and witnessed apneas by wife. Excessive daytime somnolence.  Seen for hemoglobin on higher side  Hematocrit: 52.7 % (03.13.23 @ 06:15)   Hematocrit: 54.4 % (03.12.23 @ 06:39)   Hematocrit: 53.8 % (03.11.23 @ 06:20)   Hematocrit: 53.0 % (03.10.23 @ 11:43)   Patient hct is on higher side because of likely sleep apnea and diuresis.  No clinical evidence of myeloproliferative disease. Will get EPO level  Suggest management and titration of diuresis as per cardiology.  Sleep apnea evaluation/management as outpatient  Phlebotomy only if hct increases substantially without obvious volume depletion  Abstinence from EtOH and vaping

## 2023-03-13 NOTE — PROGRESS NOTE ADULT - SUBJECTIVE AND OBJECTIVE BOX
CARDIOLOGY     PROGRESS  NOTE   ________________________________________________    CHIEF COMPLAINT:Patient is a 38y old  Male who presents with a chief complaint of sob/cp (12 Mar 2023 11:34)  no complain  	  REVIEW OF SYSTEMS:  CONSTITUTIONAL: No fever, weight loss, or fatigue  EYES: No eye pain, visual disturbances, or discharge  ENT:  No difficulty hearing, tinnitus, vertigo; No sinus or throat pain  NECK: No pain or stiffness  RESPIRATORY: No cough, wheezing, chills or hemoptysis; No Shortness of Breath  CARDIOVASCULAR: No chest pain, palpitations, passing out, dizziness, or leg swelling  GASTROINTESTINAL: No abdominal or epigastric pain. No nausea, vomiting, or hematemesis; No diarrhea or constipation. No melena or hematochezia.  GENITOURINARY: No dysuria, frequency, hematuria, or incontinence  NEUROLOGICAL: No headaches, memory loss, loss of strength, numbness, or tremors  SKIN: No itching, burning, rashes, or lesions   LYMPH Nodes: No enlarged glands  ENDOCRINE: No heat or cold intolerance; No hair loss  MUSCULOSKELETAL: No joint pain or swelling; No muscle, back, or extremity pain  PSYCHIATRIC: No depression, anxiety, mood swings, or difficulty sleeping  HEME/LYMPH: No easy bruising, or bleeding gums  ALLERGY AND IMMUNOLOGIC: No hives or eczema	    [ x] All others negative	  [ ] Unable to obtain    PHYSICAL EXAM:  T(C): 36.7 (03-13-23 @ 04:00), Max: 36.8 (03-12-23 @ 14:33)  HR: 82 (03-13-23 @ 05:34) (82 - 99)  BP: 111/75 (03-13-23 @ 05:34) (101/76 - 134/92)  RR: 18 (03-13-23 @ 04:00) (18 - 18)  SpO2: 94% (03-13-23 @ 04:00) (93% - 97%)  Wt(kg): --  I&O's Summary    12 Mar 2023 07:01  -  13 Mar 2023 07:00  --------------------------------------------------------  IN: 440 mL / OUT: 1600 mL / NET: -1160 mL        Appearance: Normal	  HEENT:   Normal oral mucosa, PERRL, EOMI	  Lymphatic: No lymphadenopathy  Cardiovascular: Normal S1 S2, No JVD, + murmurs, No edema  Respiratory: Lungs clear to auscultation	  Psychiatry: A & O x 3, Mood & affect appropriate  Gastrointestinal:  Soft, Non-tender, + BS	  Skin: No rashes, No ecchymoses, No cyanosis	  Neurologic: Non-focal  Extremities: Normal range of motion, No clubbing, cyanosis or edema  Vascular: Peripheral pulses palpable 2+ bilaterally    MEDICATIONS  (STANDING):  aspirin  chewable 81 milliGRAM(s) Oral daily  atorvastatin 20 milliGRAM(s) Oral at bedtime  chlorhexidine 2% Cloths 1 Application(s) Topical daily  furosemide   Injectable 40 milliGRAM(s) IV Push daily  heparin   Injectable 5000 Unit(s) SubCutaneous every 12 hours  influenza   Vaccine 0.5 milliLiter(s) IntraMuscular once  metoprolol succinate ER 25 milliGRAM(s) Oral daily  sacubitril 24 mG/valsartan 26 mG 1 Tablet(s) Oral two times a day  spironolactone 25 milliGRAM(s) Oral daily      TELEMETRY: 	    ECG:  	  RADIOLOGY:  OTHER: 	  	  LABS:	 	    CARDIAC MARKERS:  CARDIAC MARKERS ( 12 Mar 2023 20:16 )  x     / x     / 81 U/L / x     / 1.4 ng/mL                                17.7   5.90  )-----------( 308      ( 13 Mar 2023 06:15 )             52.7     03-12    139  |  109<H>  |  19  ----------------------------<  104<H>  3.6   |  19<L>  |  1.11    Ca    8.4      12 Mar 2023 06:39  Phos  3.3     03-12      proBNP:   Lipid Profile: Cholesterol 241  LDL --  HDL 68      HgA1c:   TSH: Thyroid Stimulating Hormone, Serum: 2.25 uIU/mL (03-12 @ 06:39)  Thyroid Stimulating Hormone, Serum: 0.36 uIU/mL (03-11 @ 06:17)    < from: Transthoracic Echocardiogram (03.12.23 @ 13:35) >  Mitral Valve: Normal mitral valve.  Aortic Valve/Aorta: Normal aortic valve.  Normal aortic root size.  Left Atrium: Normal left atrium.  Left Ventricle: Moderate left ventricular enlargement.  Estimated ejection fraction 35%, with diffuse hypokinesis.  Right Heart: Normal right atrium. Normal right ventricular  size and function.  Normal tricuspid valve.  Normal pulmonic valve. Mild pulmonic regurgitation.  Pericardium/Pleura: Normal pericardium with no pericardial  effusion.  Hemodynamic: No evidence of pulmonary hypertension.  ------------------------------------------------------------------------  Conclusions:  Moerate left ventricular enlargement.  Estimated ejection fraction 35%, with diffuse hypokinesis.    < end of copied text >        Assessment and plan  ---------------------------  38 year old male with no PMH, recent travel to [place] 1 month ago where he was diagnosed with ?yellow fever, presents to the ED with 1 week of cough, now progressively worsening sharp midsternal chest pain worse with inspiration and with coughing, and shortness of breath. Denies fever, congestion, nausea, vomiting, diarrhea, abdominal pain. Hasn't taken anything for symptoms.  chf/ pulmonary edema ?etiology  echo  esr/ crp  hiv  iv lasix  tsh  taper o2 as tolerated  strict i/o  started on Entresto  6 beats of wct add metoprolol er 25 mg daily  awaiting echo/ ID eval r/i infectious cause of cardiomyopathy from [place]  add aldactone 12.5 mg daily  cardiac mri, still awaiting echo  increase lipitor to 20 mg po qhs  cath today  heart failure team consult

## 2023-03-13 NOTE — CONSULT NOTE ADULT - ATTENDING COMMENTS
Patient is a 38 year old male with no past medical history who comes in with SOB and found to have a newly reduced EF. The patient has been started on GDMT and is on lasix 40mg IV BID  - LHC did not show any a\obstructive CAD  RHC showed elevated filling pressures with cardiac output of 4.8, cardiac index 2.1  - c/w entresto 24/26mg BID, metoprolol succinate 25mg daily, spironolactone 25mg daily  - continue with lasix 40mg IV BID and can transition to oral tomorrow  - start SGLT2i tomorrow, farxiga 10mg daily  - will obtain a cardiac MRI to rule out infiltrative process

## 2023-03-13 NOTE — CONSULT NOTE ADULT - TIME BILLING
Review of records/results/imaging, pulmonary evaluation and assessment, and discussion with patient and medical providers
- Generation of cardiovascular treatment plan.  - Coordination of care with primary team.

## 2023-03-13 NOTE — PROVIDER CONTACT NOTE (OTHER) - SITUATION
Patient had 6 beats of WCT on tele.
Patient had 11 beats wide complex tachycardia on tele and c/o left sided chest pain, non radiating.
pt had 6 beats of WCT on tele

## 2023-03-13 NOTE — PROGRESS NOTE ADULT - ASSESSMENT
38  yr year-old male      witn no pmh       presents with shortness of breath, cough, and chest pain .  for  past  week or more   symptoms have been getting progressively worse./  denies  fevers or chills.   pt   [place]   [place] 2 [place]  has  his  own  business/  cleaning company       *  c/o  sob  , for   past  week opr  more.  no  cp . with  acute  resp  failure  on  arrival  from chf      hypoxic on arrival.  with sinus tach.  was  on bipap     Ct chest angio, no pe . +.  chf     *  sob,  from acute   diastolic chf       on iv   lasix      s/p  WCT .  on torpol, asa/ lipitor /  entresto       echo, low  ef   Hb  is  18/  heme vandana  d r ohri,   Ronen 2 ordered/ ?  secondary  polycythemia    cardiac cath,  non obstructive.  wedge  34    on iv lasix  bid  now /  heart   failure team  f/p      on dvt ppx        ra< from: CT Angio Chest PE Protocol w/ IV Cont (03.10.23 @ 15:40) >  MPRESSION:  No pulmonary embolism.  Pulmonary edema with trace pleural effusions in thesetting of   cardiomegaly.  --- End of Report ---  < end of copied text >

## 2023-03-13 NOTE — PROVIDER CONTACT NOTE (OTHER) - BACKGROUND
pt admitted for HF  on IV Lasix
Patient admitted with chest pain and HF, no significant PMH. Patient had 11 beats previously, see previous provider contact note for details.
Patient admitted with chest pain and heart failure, no PMH.

## 2023-03-13 NOTE — CONSULT NOTE ADULT - CONSULT REASON
HFrEF
recent yellow fever infection
acute hypoxemic resp failure
sob/ chest pain
High hemoglobin hematocrit

## 2023-03-13 NOTE — CONSULT NOTE ADULT - SUBJECTIVE AND OBJECTIVE BOX
Patient seen and evaluated at bedside    Chief Complaint: Shortness of breath, cough    HPI:  This is a 39yo Male with no known PMHx who presented with one week of non-productive cough and shortness of breath. Symptoms progressively became worse to the point where he was waking up from sleep gasping for air which prompted him to come into the ED for further evaluation. Was diuresed with IV Lasix, now with improvement in cough and shortness of breath.     Was born in [facility]. Moved to the [place] at age 25. Has a green card. Does not have insurance, reports social work team is working on obtaining insurance for him here. Lives in [facility].     Denies any prior medical history. Reports growing up without any medical issues. He has brothers and sisters who are all healthy. Does not have children. No known medical issues with his parents or other relatives.    Drinks 6 shots of vodka daily. Vapes several times a day (nicotine). Denies illicit drug use.     Denies any recent fevers, infections, abdominal pain, n/v.        PMHx:   No pertinent past medical history    PSHx:   No significant past surgical history        Allergies:  No Known Allergies      Current Medications:   aspirin  chewable 81 milliGRAM(s) Oral daily  atorvastatin 20 milliGRAM(s) Oral at bedtime  chlorhexidine 2% Cloths 1 Application(s) Topical daily  furosemide   Injectable 40 milliGRAM(s) IV Push two times a day  heparin   Injectable 5000 Unit(s) SubCutaneous every 12 hours  influenza   Vaccine 0.5 milliLiter(s) IntraMuscular once  metoprolol succinate ER 25 milliGRAM(s) Oral daily  sacubitril 24 mG/valsartan 26 mG 1 Tablet(s) Oral two times a day  spironolactone 25 milliGRAM(s) Oral daily      FAMILY HISTORY: no known hx of any medical issues     Social History:  Smoking History: vapes daily  Alcohol Use: 6 shots vodka daily  Drug Use: denies     REVIEW OF SYSTEMS:  Constitutional:     [x ] negative [ ] fevers [ ] chills [ ] weight loss [ ] weight gain  HEENT:                  [x ] negative [ ] dry eyes [ ] eye irritation [ ] postnasal drip [ ] nasal congestion  CV:                         [ x] negative  [ ] chest pain [ ] orthopnea [ ] palpitations [ ] murmur  Resp:                     [x ] negative [ ] cough [ ] shortness of breath [ ] dyspnea [ ] wheezing [ ] sputum [ ]hemoptysis  GI:                          [ x] negative [ ] nausea [ ] vomiting [ ] diarrhea [ ] constipation [ ] abd pain [ ] dysphagia   :                        [ x] negative [ ] dysuria [ ] nocturia [ ] hematuria [ ] increased urinary frequency  Musculoskeletal: [x ] negative [ ] back pain [ ] myalgias [ ] arthralgias [ ] fracture  Skin:                       [ x] negative [ ] rash [ ] itch  Neurological:        [ x] negative [ ] headache [ ] dizziness [ ] syncope [ ] weakness [ ] numbness  Psychiatric:           [ x] negative [ ] anxiety [ ] depression  Endocrine:            [ x] negative [ ] diabetes [ ] thyroid problem  Heme/Lymph:      [ x] negative [ ] anemia [ ] bleeding problem  Allergic/Immune: [ x] negative [ ] itchy eyes [ ] nasal discharge [ ] hives [ ] angioedema    [ x] All other systems negative  [ ] Unable to assess ROS due to      Physical Exam:  T(F): 98 (03-13), Max: 98.2 (03-13)  HR: 85 (03-13) (82 - 99)  BP: 118/83 (03-13) (88/73 - 138/98)  RR: 17 (03-13)  SpO2: 96% (03-13)  GENERAL: No acute distress, well-developed  HEAD:  Atraumatic, Normocephalic  ENT: EOMI, PERRLA, conjunctiva and sclera clear, Neck supple, No JVD, moist mucosa  CHEST/LUNG: Clear to auscultation bilaterally; No wheeze, equal breath sounds bilaterally   BACK: No spinal tenderness  HEART: Regular rate and rhythm; No murmurs, rubs, or gallops  ABDOMEN: Soft, Nontender, Nondistended; Bowel sounds present  EXTREMITIES:  No clubbing, cyanosis, or edema  PSYCH: Nl behavior, nl affect  NEUROLOGY: AAOx3, non-focal, cranial nerves intact  SKIN: Normal color, No rashes or lesions  LINES:    Cardiovascular Diagnostic Testing:    ECG: Personally reviewed:    Echo: Personally reviewed:    Stress Testing:    Cath:    Imaging:    CXR: Personally reviewed    Labs: Personally reviewed                        17.7   5.90  )-----------( 308      ( 13 Mar 2023 06:15 )             52.7     03-12    139  |  109<H>  |  19  ----------------------------<  104<H>  3.6   |  19<L>  |  1.11    Ca    8.4      12 Mar 2023 06:39  Phos  3.3     03-12              Thyroid Stimulating Hormone, Serum: 2.25 uIU/mL (03-12 @ 06:39)  Thyroid Stimulating Hormone, Serum: 0.36 uIU/mL (03-11 @ 06:17)   Patient seen and evaluated at bedside    Chief Complaint: Shortness of breath, cough    HPI:  This is a 37yo Male with no known PMHx who presented with one week of non-productive cough and shortness of breath. Symptoms progressively became worse to the point where he was waking up from sleep gasping for air which prompted him to come into the ED for further evaluation. Was diuresed with IV Lasix, now with improvement in cough and shortness of breath.     Was born in [facility]. Moved to the [place] at age 25. Has a green card. Does not have insurance, reports social work team is working on obtaining insurance for him here. Lives in [facility].     Denies any prior medical history. Reports growing up without any medical issues. He has brothers and sisters who are all healthy. Does not have children. No known medical issues with his parents or other relatives.    Drinks 6 shots of vodka daily. Vapes several times a day (nicotine). Denies illicit drug use.     Denies any recent fevers, infections, abdominal pain, n/v.        PMHx:   No pertinent past medical history    PSHx:   No significant past surgical history        Allergies:  No Known Allergies      Current Medications:   aspirin  chewable 81 milliGRAM(s) Oral daily  atorvastatin 20 milliGRAM(s) Oral at bedtime  chlorhexidine 2% Cloths 1 Application(s) Topical daily  furosemide   Injectable 40 milliGRAM(s) IV Push two times a day  heparin   Injectable 5000 Unit(s) SubCutaneous every 12 hours  influenza   Vaccine 0.5 milliLiter(s) IntraMuscular once  metoprolol succinate ER 25 milliGRAM(s) Oral daily  sacubitril 24 mG/valsartan 26 mG 1 Tablet(s) Oral two times a day  spironolactone 25 milliGRAM(s) Oral daily      FAMILY HISTORY: no known hx of any medical issues     Social History:  Smoking History: vapes daily  Alcohol Use: 6 shots vodka daily  Drug Use: denies     REVIEW OF SYSTEMS:  Constitutional:     [x ] negative [ ] fevers [ ] chills [ ] weight loss [ ] weight gain  HEENT:                  [x ] negative [ ] dry eyes [ ] eye irritation [ ] postnasal drip [ ] nasal congestion  CV:                         [ x] negative  [ ] chest pain [ ] orthopnea [ ] palpitations [ ] murmur  Resp:                     [ ] negative [ x] cough [x ] shortness of breath [ ] dyspnea [ ] wheezing [ ] sputum [ ]hemoptysis  GI:                          [ x] negative [ ] nausea [ ] vomiting [ ] diarrhea [ ] constipation [ ] abd pain [ ] dysphagia   :                        [ x] negative [ ] dysuria [ ] nocturia [ ] hematuria [ ] increased urinary frequency  Musculoskeletal: [x ] negative [ ] back pain [ ] myalgias [ ] arthralgias [ ] fracture  Skin:                       [ x] negative [ ] rash [ ] itch  Neurological:        [ x] negative [ ] headache [ ] dizziness [ ] syncope [ ] weakness [ ] numbness  Psychiatric:           [ x] negative [ ] anxiety [ ] depression  Endocrine:            [ x] negative [ ] diabetes [ ] thyroid problem  Heme/Lymph:      [ x] negative [ ] anemia [ ] bleeding problem  Allergic/Immune: [ x] negative [ ] itchy eyes [ ] nasal discharge [ ] hives [ ] angioedema    [ x] All other systems negative  [ ] Unable to assess ROS due to      Physical Exam:  T(F): 98 (03-13), Max: 98.2 (03-13)  HR: 85 (03-13) (82 - 99)  BP: 118/83 (03-13) (88/73 - 138/98)  RR: 17 (03-13)  SpO2: 96% (03-13)  GENERAL: No acute distress, well-developed  HEAD:  Atraumatic, Normocephalic  ENT: EOMI, conjunctiva and sclera clear, Neck supple, No JVD, moist mucosa  CHEST/LUNG: Clear to auscultation bilaterally; No wheeze, equal breath sounds bilaterally   HEART: Regular rate and rhythm; No murmurs, rubs, or gallops  ABDOMEN: Soft, Nontender, Nondistended; Bowel sounds present  EXTREMITIES:  No clubbing, cyanosis, or edema  PSYCH: Nl behavior, nl affect  NEUROLOGY: AAOx3, non-focal, cranial nerves intact  SKIN: Normal color, No rashes or lesions    Cardiovascular Diagnostic Testing:    ECG: Personally reviewed:  Sinus, LVH    Echo: Personally reviewed:  TTE EF 35%, diffuse hypokinesis     Cath:  [facility] 3/13/23: luminal irregularities     Imaging:    CXR: Personally reviewed    Labs: Personally reviewed                        17.7   5.90  )-----------( 308      ( 13 Mar 2023 06:15 )             52.7     03-12    139  |  109<H>  |  19  ----------------------------<  104<H>  3.6   |  19<L>  |  1.11    Ca    8.4      12 Mar 2023 06:39  Phos  3.3     03-12        Thyroid Stimulating Hormone, Serum: 2.25 uIU/mL (03-12 @ 06:39)  Thyroid Stimulating Hormone, Serum: 0.36 uIU/mL (03-11 @ 06:17)

## 2023-03-13 NOTE — CONSULT NOTE ADULT - ASSESSMENT
This is a 37yo Male with no known PMHx, originally from [facility], who presented with one week of non-productive cough and shortness of breath. Admitted for CHF exacerbation. Found to have EF 35% with diffuse LV hypokinesis. LHC 3/13 with luminal irregularities.     Cardiac Studies:  - RHC 3/13/23: RA 9. RV 57/9, PA 52/33, PCWP 39, Sudha CO/CI 4.2/2.0  - [facility] 3/13/23: luminal irregularities   - TTE 3/12/23: EF 35%, diffuse hypokinesis, LViDd 5.5cm

## 2023-03-13 NOTE — PROVIDER CONTACT NOTE (OTHER) - ACTION/TREATMENT ORDERED:
GWENDOLYN Romero notified. EKG ordered, STAT cardiac enzymes/phos level ordered, Potassium supplemented, medication given for pain. See EMAR for details.
Per NP will replace potassium , will continue to monitor
ACP Nanette Romero notified. Continue to monitor patient on tele.

## 2023-03-13 NOTE — PROGRESS NOTE ADULT - SUBJECTIVE AND OBJECTIVE BOX
afebriel  REVIEW OF SYSTEMS:  CONSTITUTIONAL: No fever,  no  weight loss  ENT:  No  tinnitus,   no   vertigo  NECK: No pain or stiffness  RESPIRATORY: No cough, wheezing, chills or hemoptysis;    No Shortness of Breath  CARDIOVASCULAR: No chest pain, palpitations, dizziness  GASTROINTESTINAL: No abdominal or epigastric pain. No nausea, vomiting, or hematemesis; No diarrhea  No melena or hematochezia.  GENITOURINARY: No dysuria, frequency, hematuria, or incontinence  NEUROLOGICAL: No headaches  SKIN: No itching,  no   rash  LYMPH Nodes: No enlarged glands  ENDOCRINE: No heat or cold intolerance  MUSCULOSKELETAL: No joint pain or swelling  PSYCHIATRIC: No depression, anxiety  HEME/LYMPH: No easy bruising, or bleeding gums  ALLERGY AND IMMUNOLOGIC: No hives or eczema	    MEDICATIONS  (STANDING):  aspirin  chewable 81 milliGRAM(s) Oral daily  atorvastatin 20 milliGRAM(s) Oral at bedtime  chlorhexidine 2% Cloths 1 Application(s) Topical daily  furosemide   Injectable 40 milliGRAM(s) IV Push two times a day  heparin   Injectable 5000 Unit(s) SubCutaneous every 12 hours  influenza   Vaccine 0.5 milliLiter(s) IntraMuscular once  metoprolol succinate ER 25 milliGRAM(s) Oral daily  sacubitril 24 mG/valsartan 26 mG 1 Tablet(s) Oral two times a day  spironolactone 25 milliGRAM(s) Oral daily    MEDICATIONS  (PRN):      Vital Signs Last 24 Hrs  T(C): 36.7 (13 Mar 2023 04:00), Max: 36.8 (12 Mar 2023 14:33)  T(F): 98 (13 Mar 2023 04:00), Max: 98.3 (12 Mar 2023 14:33)  HR: 82 (13 Mar 2023 05:34) (82 - 99)  BP: 111/75 (13 Mar 2023 05:34) (101/76 - 134/92)  BP(mean): --  RR: 18 (13 Mar 2023 04:00) (18 - 18)  SpO2: 94% (13 Mar 2023 04:00) (93% - 97%)    Parameters below as of 13 Mar 2023 04:00  Patient On (Oxygen Delivery Method): room air      CAPILLARY BLOOD GLUCOSE        I&O's Summary    12 Mar 2023 07:01  -  13 Mar 2023 07:00  --------------------------------------------------------  IN: 440 mL / OUT: 1600 mL / NET: -1160 mL          Appearance: Normal	  HEENT:   Normal oral mucosa, PERRL, EOMI	  Lymphatic: No lymphadenopathy  Cardiovascular: Normal S1 S2, No JVD  Respiratory: Lungs clear to auscultation	  Psychiatry: A & O x 3, Mood & affect appropriate  Gastrointestinal:  Soft, Non-tender, + BS	  Skin: No rash, No ecchymoses	  Extremities: Normal range of motion  Vascular: Peripheral pulses palpable bilaterally    LABS:                        17.7   5.90  )-----------( 308      ( 13 Mar 2023 06:15 )             52.7     03-12    139  |  109<H>  |  19  ----------------------------<  104<H>  3.6   |  19<L>  |  1.11    Ca    8.4      12 Mar 2023 06:39  Phos  3.3     03-12        CARDIAC MARKERS ( 12 Mar 2023 20:16 )  x     / x     / 81 U/L / x     / 1.4 ng/mL                Thyroid Stimulating Hormone, Serum: 2.25 uIU/mL (03-12 @ 06:39)          Consultant(s) Notes Reviewed:      Care Discussed with Consultants/Other Providers:

## 2023-03-13 NOTE — PROVIDER CONTACT NOTE (OTHER) - ASSESSMENT
vitals noted denies any chest pain , palpitation , SOB at this time
Patient A&O x4, complaining of left sided chest pain, non radiating . The patient rated his pain 6/10. Patient denies palpitations, lightheadedness, dizziness. VSS.
Patient A&O x4, denies chest pain, SOB, palpitations.VSS.

## 2023-03-13 NOTE — PROVIDER CONTACT NOTE (OTHER) - REASON
Patient had 11 beats wide complex tachycardia, chest pain
pt had 6 beats of WCT on tele
Patient has 6 beats of WCT

## 2023-03-13 NOTE — CONSULT NOTE ADULT - PROBLEM SELECTOR RECOMMENDATION 9
- NICM. C with luminal irregularities. Etiology unclear. Does have significant alcohol and tobacco use. Please obtain Cardiac MRI. Can consider genetic testing as an outpatient.    - RHC today with elevated filling pressures. Continue IV Lasix today, will consider transitioning to PO tomorrow  - Continue Metoprolol XL 25mg daily  - Continue Entresto 24-26mg BID, hold for SBP < 90  - Continue Aldactone 25mg daily  - Check daily standing weights, strict I+Os

## 2023-03-14 ENCOUNTER — TRANSCRIPTION ENCOUNTER (OUTPATIENT)
Age: 38
End: 2023-03-14

## 2023-03-14 VITALS
TEMPERATURE: 98 F | HEART RATE: 78 BPM | OXYGEN SATURATION: 97 % | DIASTOLIC BLOOD PRESSURE: 64 MMHG | SYSTOLIC BLOOD PRESSURE: 100 MMHG | RESPIRATION RATE: 19 BRPM

## 2023-03-14 LAB
ANION GAP SERPL CALC-SCNC: 11 MMOL/L — SIGNIFICANT CHANGE UP (ref 5–17)
BUN SERPL-MCNC: 18 MG/DL — SIGNIFICANT CHANGE UP (ref 7–23)
CALCIUM SERPL-MCNC: 8.9 MG/DL — SIGNIFICANT CHANGE UP (ref 8.4–10.5)
CHLORIDE SERPL-SCNC: 105 MMOL/L — SIGNIFICANT CHANGE UP (ref 96–108)
CO2 SERPL-SCNC: 23 MMOL/L — SIGNIFICANT CHANGE UP (ref 22–31)
CREAT SERPL-MCNC: 1.11 MG/DL — SIGNIFICANT CHANGE UP (ref 0.5–1.3)
EGFR: 87 ML/MIN/1.73M2 — SIGNIFICANT CHANGE UP
GLUCOSE SERPL-MCNC: 98 MG/DL — SIGNIFICANT CHANGE UP (ref 70–99)
POTASSIUM SERPL-MCNC: 3.5 MMOL/L — SIGNIFICANT CHANGE UP (ref 3.5–5.3)
POTASSIUM SERPL-SCNC: 3.5 MMOL/L — SIGNIFICANT CHANGE UP (ref 3.5–5.3)
SODIUM SERPL-SCNC: 139 MMOL/L — SIGNIFICANT CHANGE UP (ref 135–145)

## 2023-03-14 PROCEDURE — 84443 ASSAY THYROID STIM HORMONE: CPT

## 2023-03-14 PROCEDURE — 82435 ASSAY OF BLOOD CHLORIDE: CPT

## 2023-03-14 PROCEDURE — 84295 ASSAY OF SERUM SODIUM: CPT

## 2023-03-14 PROCEDURE — 86703 HIV-1/HIV-2 1 RESULT ANTBDY: CPT

## 2023-03-14 PROCEDURE — 82330 ASSAY OF CALCIUM: CPT

## 2023-03-14 PROCEDURE — 82803 BLOOD GASES ANY COMBINATION: CPT

## 2023-03-14 PROCEDURE — 80048 BASIC METABOLIC PNL TOTAL CA: CPT

## 2023-03-14 PROCEDURE — C1769: CPT

## 2023-03-14 PROCEDURE — 83605 ASSAY OF LACTIC ACID: CPT

## 2023-03-14 PROCEDURE — 85610 PROTHROMBIN TIME: CPT

## 2023-03-14 PROCEDURE — 71045 X-RAY EXAM CHEST 1 VIEW: CPT

## 2023-03-14 PROCEDURE — 93456 R HRT CORONARY ARTERY ANGIO: CPT

## 2023-03-14 PROCEDURE — 85027 COMPLETE CBC AUTOMATED: CPT

## 2023-03-14 PROCEDURE — 36600 WITHDRAWAL OF ARTERIAL BLOOD: CPT

## 2023-03-14 PROCEDURE — 94660 CPAP INITIATION&MGMT: CPT

## 2023-03-14 PROCEDURE — 80307 DRUG TEST PRSMV CHEM ANLYZR: CPT

## 2023-03-14 PROCEDURE — 85014 HEMATOCRIT: CPT

## 2023-03-14 PROCEDURE — 84484 ASSAY OF TROPONIN QUANT: CPT

## 2023-03-14 PROCEDURE — 99285 EMERGENCY DEPT VISIT HI MDM: CPT | Mod: 25

## 2023-03-14 PROCEDURE — 93306 TTE W/DOPPLER COMPLETE: CPT

## 2023-03-14 PROCEDURE — 85730 THROMBOPLASTIN TIME PARTIAL: CPT

## 2023-03-14 PROCEDURE — 84132 ASSAY OF SERUM POTASSIUM: CPT

## 2023-03-14 PROCEDURE — 82550 ASSAY OF CK (CPK): CPT

## 2023-03-14 PROCEDURE — 36415 COLL VENOUS BLD VENIPUNCTURE: CPT

## 2023-03-14 PROCEDURE — 80061 LIPID PANEL: CPT

## 2023-03-14 PROCEDURE — 85018 HEMOGLOBIN: CPT

## 2023-03-14 PROCEDURE — 93005 ELECTROCARDIOGRAM TRACING: CPT

## 2023-03-14 PROCEDURE — C1894: CPT

## 2023-03-14 PROCEDURE — 81270 JAK2 GENE: CPT

## 2023-03-14 PROCEDURE — 85652 RBC SED RATE AUTOMATED: CPT

## 2023-03-14 PROCEDURE — 96374 THER/PROPH/DIAG INJ IV PUSH: CPT

## 2023-03-14 PROCEDURE — 82947 ASSAY GLUCOSE BLOOD QUANT: CPT

## 2023-03-14 PROCEDURE — 83735 ASSAY OF MAGNESIUM: CPT

## 2023-03-14 PROCEDURE — 0225U NFCT DS DNA&RNA 21 SARSCOV2: CPT

## 2023-03-14 PROCEDURE — 82668 ASSAY OF ERYTHROPOIETIN: CPT

## 2023-03-14 PROCEDURE — 87640 STAPH A DNA AMP PROBE: CPT

## 2023-03-14 PROCEDURE — 71275 CT ANGIOGRAPHY CHEST: CPT | Mod: MA

## 2023-03-14 PROCEDURE — 83880 ASSAY OF NATRIURETIC PEPTIDE: CPT

## 2023-03-14 PROCEDURE — 86140 C-REACTIVE PROTEIN: CPT

## 2023-03-14 PROCEDURE — 75561 CARDIAC MRI FOR MORPH W/DYE: CPT | Mod: 26

## 2023-03-14 PROCEDURE — 84100 ASSAY OF PHOSPHORUS: CPT

## 2023-03-14 PROCEDURE — C1887: CPT

## 2023-03-14 PROCEDURE — 75561 CARDIAC MRI FOR MORPH W/DYE: CPT

## 2023-03-14 PROCEDURE — 82553 CREATINE MB FRACTION: CPT

## 2023-03-14 PROCEDURE — 80053 COMPREHEN METABOLIC PANEL: CPT

## 2023-03-14 PROCEDURE — 85025 COMPLETE CBC W/AUTO DIFF WBC: CPT

## 2023-03-14 PROCEDURE — 87641 MR-STAPH DNA AMP PROBE: CPT

## 2023-03-14 RX ORDER — VALSARTAN 80 MG/1
1 TABLET ORAL
Qty: 60 | Refills: 0
Start: 2023-03-14 | End: 2023-04-12

## 2023-03-14 RX ORDER — ATORVASTATIN CALCIUM 80 MG/1
1 TABLET, FILM COATED ORAL
Qty: 30 | Refills: 0
Start: 2023-03-14 | End: 2023-04-12

## 2023-03-14 RX ORDER — FUROSEMIDE 40 MG
1 TABLET ORAL
Qty: 30 | Refills: 0
Start: 2023-03-14 | End: 2023-04-12

## 2023-03-14 RX ORDER — METOPROLOL TARTRATE 50 MG
0.5 TABLET ORAL
Qty: 30 | Refills: 0
Start: 2023-03-14 | End: 2023-04-12

## 2023-03-14 RX ORDER — METOPROLOL TARTRATE 50 MG
12.5 TABLET ORAL
Refills: 0 | Status: DISCONTINUED | OUTPATIENT
Start: 2023-03-14 | End: 2023-03-14

## 2023-03-14 RX ORDER — ASPIRIN/CALCIUM CARB/MAGNESIUM 324 MG
1 TABLET ORAL
Qty: 0 | Refills: 0 | DISCHARGE
Start: 2023-03-14

## 2023-03-14 RX ORDER — SPIRONOLACTONE 25 MG/1
0.5 TABLET, FILM COATED ORAL
Qty: 30 | Refills: 0
Start: 2023-03-14 | End: 2023-04-12

## 2023-03-14 RX ORDER — FUROSEMIDE 40 MG
40 TABLET ORAL DAILY
Refills: 0 | Status: DISCONTINUED | OUTPATIENT
Start: 2023-03-14 | End: 2023-03-14

## 2023-03-14 RX ORDER — VALSARTAN 80 MG/1
40 TABLET ORAL
Refills: 0 | Status: DISCONTINUED | OUTPATIENT
Start: 2023-03-14 | End: 2023-03-14

## 2023-03-14 RX ADMIN — VALSARTAN 40 MILLIGRAM(S): 80 TABLET ORAL at 17:02

## 2023-03-14 RX ADMIN — Medication 25 MILLIGRAM(S): at 06:01

## 2023-03-14 RX ADMIN — Medication 12.5 MILLIGRAM(S): at 18:16

## 2023-03-14 RX ADMIN — SACUBITRIL AND VALSARTAN 1 TABLET(S): 24; 26 TABLET, FILM COATED ORAL at 06:02

## 2023-03-14 RX ADMIN — CHLORHEXIDINE GLUCONATE 1 APPLICATION(S): 213 SOLUTION TOPICAL at 11:55

## 2023-03-14 RX ADMIN — SPIRONOLACTONE 25 MILLIGRAM(S): 25 TABLET, FILM COATED ORAL at 06:01

## 2023-03-14 RX ADMIN — Medication 81 MILLIGRAM(S): at 11:55

## 2023-03-14 RX ADMIN — Medication 40 MILLIGRAM(S): at 06:02

## 2023-03-14 NOTE — PROGRESS NOTE ADULT - SUBJECTIVE AND OBJECTIVE BOX
CARDIOLOGY     PROGRESS  NOTE   ________________________________________________    CHIEF COMPLAINT:Patient is a 38y old  Male who presents with a chief complaint of sob/cp (13 Mar 2023 15:39)  no complain  	  REVIEW OF SYSTEMS:  CONSTITUTIONAL: No fever, weight loss, or fatigue  EYES: No eye pain, visual disturbances, or discharge  ENT:  No difficulty hearing, tinnitus, vertigo; No sinus or throat pain  NECK: No pain or stiffness  RESPIRATORY: No cough, wheezing, chills or hemoptysis; No Shortness of Breath  CARDIOVASCULAR: No chest pain, palpitations, passing out, dizziness, or leg swelling  GASTROINTESTINAL: No abdominal or epigastric pain. No nausea, vomiting, or hematemesis; No diarrhea or constipation. No melena or hematochezia.  GENITOURINARY: No dysuria, frequency, hematuria, or incontinence  NEUROLOGICAL: No headaches, memory loss, loss of strength, numbness, or tremors  SKIN: No itching, burning, rashes, or lesions   LYMPH Nodes: No enlarged glands  ENDOCRINE: No heat or cold intolerance; No hair loss  MUSCULOSKELETAL: No joint pain or swelling; No muscle, back, or extremity pain  PSYCHIATRIC: No depression, anxiety, mood swings, or difficulty sleeping  HEME/LYMPH: No easy bruising, or bleeding gums  ALLERGY AND IMMUNOLOGIC: No hives or eczema	    [ x] All others negative	  [ ] Unable to obtain    PHYSICAL EXAM:  T(C): 36.6 (03-14-23 @ 08:00), Max: 36.7 (03-13-23 @ 10:45)  HR: 90 (03-14-23 @ 08:00) (78 - 92)  BP: 118/80 (03-14-23 @ 08:00) (88/73 - 138/98)  RR: 18 (03-14-23 @ 08:00) (16 - 18)  SpO2: 96% (03-14-23 @ 08:00) (91% - 99%)  Wt(kg): --  I&O's Summary    13 Mar 2023 07:01  -  14 Mar 2023 07:00  --------------------------------------------------------  IN: 600 mL / OUT: 1375 mL / NET: -775 mL        Appearance: Normal	  HEENT:   Normal oral mucosa, PERRL, EOMI	  Lymphatic: No lymphadenopathy  Cardiovascular: Normal S1 S2, No JVD, + murmurs, No edema  Respiratory: rhonchi  Psychiatry: A & O x 3, Mood & affect appropriate  Gastrointestinal:  Soft, Non-tender, + BS	  Skin: No rashes, No ecchymoses, No cyanosis	  Neurologic: Non-focal  Extremities: Normal range of motion, No clubbing, cyanosis or edema  Vascular: Peripheral pulses palpable 2+ bilaterally    MEDICATIONS  (STANDING):  aspirin  chewable 81 milliGRAM(s) Oral daily  atorvastatin 20 milliGRAM(s) Oral at bedtime  chlorhexidine 2% Cloths 1 Application(s) Topical daily  furosemide   Injectable 40 milliGRAM(s) IV Push two times a day  heparin   Injectable 5000 Unit(s) SubCutaneous every 12 hours  influenza   Vaccine 0.5 milliLiter(s) IntraMuscular once  metoprolol succinate ER 25 milliGRAM(s) Oral daily  sacubitril 24 mG/valsartan 26 mG 1 Tablet(s) Oral two times a day  spironolactone 25 milliGRAM(s) Oral daily      TELEMETRY: 	    ECG:  	  RADIOLOGY:  OTHER: 	  	  LABS:	 	    CARDIAC MARKERS:  CARDIAC MARKERS ( 12 Mar 2023 20:16 )  x     / x     / 81 U/L / x     / 1.4 ng/mL                                17.7   5.90  )-----------( 308      ( 13 Mar 2023 06:15 )             52.7     03-14    139  |  105  |  18  ----------------------------<  98  3.5   |  23  |  1.11    Ca    8.9      14 Mar 2023 06:51  Phos  3.3     03-12      proBNP:   Lipid Profile: Cholesterol 241  LDL --  HDL 68      HgA1c:   TSH: Thyroid Stimulating Hormone, Serum: 2.25 uIU/mL (03-12 @ 06:39)  Thyroid Stimulating Hormone, Serum: 0.36 uIU/mL (03-11 @ 06:17)          Assessment and plan  ---------------------------  38 year old male with no PMH, recent travel to [place] 1 month ago where he was diagnosed with ?yellow fever, presents to the ED with 1 week of cough, now progressively worsening sharp midsternal chest pain worse with inspiration and with coughing, and shortness of breath. Denies fever, congestion, nausea, vomiting, diarrhea, abdominal pain. Hasn't taken anything for symptoms.  chf/ pulmonary edema ?etiology  echo  esr/ crp  hiv  iv lasix  tsh  taper o2 as tolerated  strict i/o  started on Entresto  6 beats of wct add metoprolol er 25 mg daily  awaiting echo/ ID eval r/i infectious cause of cardiomyopathy from [place]  add aldactone 12.5 mg daily  cardiac mri, still awaiting echo  increase lipitor to 20 mg po qhs  cath today  heart failure team consult appreciated, awaiting cardiac MR  social work consult

## 2023-03-14 NOTE — PHARMACOTHERAPY INTERVENTION NOTE - COMMENTS
Spoke to patient about indication, directions, and side effect profile of medications. Informed patient of anticoagulant use in hospital, formulary changes, and other changes made in hospital. Inpatient medication list provided.     aspirin  chewable 81 milliGRAM(s) Oral daily  atorvastatin 20 milliGRAM(s) Oral at bedtime  furosemide    Tablet 40 milliGRAM(s) Oral daily  metoprolol tartrate 12.5 milliGRAM(s) Oral two times a day  spironolactone 25 milliGRAM(s) Oral daily  valsartan 40 milliGRAM(s) Oral two times a day      All of the prescriptions has been sent to be dispensary of [place] [facility] and will be delivered. Advised pt to purchase aspirin 81mg at [facility] pharmacy downstairs before leaving. Counseled pt on instructions and timing of medications.    Anjel Baig (Jian Ming)  Pharmacist  Available on [employer] Teams

## 2023-03-14 NOTE — PROGRESS NOTE ADULT - SUBJECTIVE AND OBJECTIVE BOX
INTERVAL HPI/OVERNIGHT EVENTS:  Pt seen and examined at bedside.     Allergies/Intolerance: No Known Allergies      MEDICATIONS  (STANDING):  aspirin  chewable 81 milliGRAM(s) Oral daily  atorvastatin 20 milliGRAM(s) Oral at bedtime  chlorhexidine 2% Cloths 1 Application(s) Topical daily  furosemide   Injectable 40 milliGRAM(s) IV Push two times a day  heparin   Injectable 5000 Unit(s) SubCutaneous every 12 hours  influenza   Vaccine 0.5 milliLiter(s) IntraMuscular once  metoprolol succinate ER 25 milliGRAM(s) Oral daily  sacubitril 24 mG/valsartan 26 mG 1 Tablet(s) Oral two times a day  spironolactone 25 milliGRAM(s) Oral daily    MEDICATIONS  (PRN):        ROS: all systems reviewed and wnl      PHYSICAL EXAMINATION:  Vital Signs Last 24 Hrs  T(C): 36.6 (14 Mar 2023 08:00), Max: 36.7 (13 Mar 2023 10:45)  T(F): 97.9 (14 Mar 2023 08:00), Max: 98 (13 Mar 2023 10:45)  HR: 90 (14 Mar 2023 08:00) (78 - 92)  BP: 118/80 (14 Mar 2023 08:00) (88/73 - 138/98)  BP(mean): 96 (13 Mar 2023 12:45) (56 - 110)  RR: 18 (14 Mar 2023 08:00) (16 - 18)  SpO2: 96% (14 Mar 2023 08:00) (91% - 99%)    Parameters below as of 14 Mar 2023 08:00  Patient On (Oxygen Delivery Method): room air      CAPILLARY BLOOD GLUCOSE          03-13 @ 07:01  -  03-14 @ 07:00  --------------------------------------------------------  IN: 600 mL / OUT: 1375 mL / NET: -775 mL        GENERAL:   NECK: supple, No JVD  CHEST/LUNG: clear to auscultation bilaterally; no rales, rhonchi, or wheezing b/l  HEART: normal S1, S2  ABDOMEN: BS+, soft, ND, NT   EXTREMITIES:  pulses palpable; no clubbing, cyanosis, or edema b/l LEs  SKIN: no rashes or lesions      LABS:                        17.7   5.90  )-----------( 308      ( 13 Mar 2023 06:15 )             52.7     03-14    139  |  105  |  18  ----------------------------<  98  3.5   |  23  |  1.11    Ca    8.9      14 Mar 2023 06:51  Phos  3.3     03-12                 INTERVAL HPI/OVERNIGHT EVENTS:  Pt seen and examined at bedside.     Allergies/Intolerance: No Known Allergies      MEDICATIONS  (STANDING):  aspirin  chewable 81 milliGRAM(s) Oral daily  atorvastatin 20 milliGRAM(s) Oral at bedtime  chlorhexidine 2% Cloths 1 Application(s) Topical daily  furosemide   Injectable 40 milliGRAM(s) IV Push two times a day  heparin   Injectable 5000 Unit(s) SubCutaneous every 12 hours  influenza   Vaccine 0.5 milliLiter(s) IntraMuscular once  metoprolol succinate ER 25 milliGRAM(s) Oral daily  sacubitril 24 mG/valsartan 26 mG 1 Tablet(s) Oral two times a day  spironolactone 25 milliGRAM(s) Oral daily    MEDICATIONS  (PRN):        ROS: all systems reviewed and wnl      PHYSICAL EXAMINATION:  Vital Signs Last 24 Hrs  T(C): 36.6 (14 Mar 2023 08:00), Max: 36.7 (13 Mar 2023 10:45)  T(F): 97.9 (14 Mar 2023 08:00), Max: 98 (13 Mar 2023 10:45)  HR: 90 (14 Mar 2023 08:00) (78 - 92)  BP: 118/80 (14 Mar 2023 08:00) (88/73 - 138/98)  BP(mean): 96 (13 Mar 2023 12:45) (56 - 110)  RR: 18 (14 Mar 2023 08:00) (16 - 18)  SpO2: 96% (14 Mar 2023 08:00) (91% - 99%)    Parameters below as of 14 Mar 2023 08:00  Patient On (Oxygen Delivery Method): room air      CAPILLARY BLOOD GLUCOSE          03-13 @ 07:01  -  03-14 @ 07:00  --------------------------------------------------------  IN: 600 mL / OUT: 1375 mL / NET: -775 mL        GENERAL: stable, comfortable at rest, no CP or SOB at rest.   NECK: supple, No JVD  CHEST/LUNG: clear to auscultation bilaterally; no rales, rhonchi, or wheezing b/l  HEART: normal S1, S2  ABDOMEN: BS+, soft, ND, NT   EXTREMITIES:  pulses palpable; no clubbing, cyanosis, or edema b/l LEs    LABS:                        17.7   5.90  )-----------( 308      ( 13 Mar 2023 06:15 )             52.7     03-14    139  |  105  |  18  ----------------------------<  98  3.5   |  23  |  1.11    Ca    8.9      14 Mar 2023 06:51  Phos  3.3     03-12

## 2023-03-14 NOTE — PROGRESS NOTE ADULT - ASSESSMENT
38  yr year-old male with no pmh admitted 3/10/23 with shortness of breath, cough, and chest pain for 1 week or more   His symptoms had been getting progressively worse.  Denied  fevers or chills.  [place] [facility] about 2 weeks PTA.  Ct chest angio, no PE   Denies any prior medical history. Reports growing up without any medical issues. He has brothers and sisters who are all healthy. Does not have children. No known medical issues with his parents or other relatives.  Drinks 6 shots of vodka daily. Vapes several times a day (nicotine). Denies illicit drug use.   Being managed for CHF or ? etiology.  Gives h/o heavy snoring at home and witnessed apneas by wife. Excessive daytime somnolence. heavy snoring at home and witnessed apneas by wife. Excessive daytime somnolence.  Seen for hemoglobin on higher side. Patient hct is on higher side because of likely sleep apnea and diuresis.  No clinical evidence of myeloproliferative disease. EPO level was sent  Suggest management as per cardiology, CHF team  Sleep apnea evaluation/management as outpatient  Phlebotomy only if hct increases substantially without obvious volume depletion  Abstinence from EtOH and vaping

## 2023-03-14 NOTE — DISCHARGE NOTE NURSING/CASE MANAGEMENT/SOCIAL WORK - NSDCPEWEB_GEN_ALL_CORE
[facility] for Tobacco Control website --- [email or web address] website --- [email or web address]

## 2023-03-14 NOTE — PROGRESS NOTE ADULT - SUBJECTIVE AND OBJECTIVE BOX
HPI:    38  yr year-old male      witn no pmh       presents with shortness of breath, cough, and chest pain .  for  past  week or more        His symptoms have been getting progressively worse.   denies  fevers or chills.   pt   [place] [facility]   about 2 weeks ago.   hypoxic on arrival   Ct chest angio, no pe  (10 Mar 2023 18:57)    PAST MEDICAL & SURGICAL HISTORY:  No pertinent past medical history      No significant past surgical history        Allergies    No Known Allergies    Intolerances      Social History:    Medications:  aspirin  chewable 81 milliGRAM(s) Oral daily  atorvastatin 20 milliGRAM(s) Oral at bedtime  chlorhexidine 2% Cloths 1 Application(s) Topical daily  furosemide    Tablet 40 milliGRAM(s) Oral daily  heparin   Injectable 5000 Unit(s) SubCutaneous every 12 hours  influenza   Vaccine 0.5 milliLiter(s) IntraMuscular once  metoprolol succinate ER 25 milliGRAM(s) Oral daily  sacubitril 24 mG/valsartan 26 mG 1 Tablet(s) Oral two times a day  spironolactone 25 milliGRAM(s) Oral daily    Labs:  CBC Full  -  ( 13 Mar 2023 06:15 )  WBC Count : 5.90 K/uL  RBC Count : 6.05 M/uL  Hemoglobin : 17.7 g/dL  Hematocrit : 52.7 %  Platelet Count - Automated : 308 K/uL  Mean Cell Volume : 87.1 fl  Mean Cell Hemoglobin : 29.3 pg  Mean Cell Hemoglobin Concentration : 33.6 gm/dL  Auto Neutrophil # : x  Auto Lymphocyte # : x  Auto Monocyte # : x  Auto Eosinophil # : x  Auto Basophil # : x  Auto Neutrophil % : x  Auto Lymphocyte % : x  Auto Monocyte % : x  Auto Eosinophil % : x  Auto Basophil % : x    03-14    139  |  105  |  18  ----------------------------<  98  3.5   |  23  |  1.11    Ca    8.9      14 Mar 2023 06:51  Phos  3.3     03-12        Radiology:             ROS:  Patient comfortable without distress  No SOB or chest pain  No palpitation  No abdominal pain, diarrhaea or constipation  No weakness of extremities  No skin changes or swelling of legs  Rest of the comprehensive ROS was negative  Vital Signs Last 24 Hrs  T(C): 36.6 (14 Mar 2023 08:00), Max: 36.7 (13 Mar 2023 14:00)  T(F): 97.9 (14 Mar 2023 08:00), Max: 98 (13 Mar 2023 14:00)  HR: 90 (14 Mar 2023 08:00) (78 - 92)  BP: 118/80 (14 Mar 2023 08:00) (100/75 - 132/98)  BP(mean): 96 (13 Mar 2023 12:45) (85 - 109)  RR: 18 (14 Mar 2023 08:00) (16 - 18)  SpO2: 96% (14 Mar 2023 08:00) (91% - 99%)    Parameters below as of 14 Mar 2023 08:00  Patient On (Oxygen Delivery Method): room air        Physical exam:  Patient alert and oriented  No distress  CVS: S1, S2 regular or murmur  Chest: bilateral breath sound without rales  Abdomen: soft, not tender, no organomegaly or masses  CNS: No focal neuro deficit  Musculoskeletal:  Normal range of motion  Skin: No rash    Assessment and Plan: 38  [facility]23 with shortness of breath, cough, and chest pain for 1 week or more   His symptoms had been getting progressively worse.  Denied  fevers or chills.  [place] back [place] [facility] about 2 weeks PTA.  Ct chest angio, no PE   Denies any prior medical history. Reports growing up without any medical issues. He has brothers and sisters who are all healthy. Does not have children. No known medical issues with his parents or other relatives.  Drinks 6 shots of vodka daily. Vapes several times a day (nicotine). Denies illicit drug use.   Was managed for CHF or ? etiology.  Gives h/o heavy snoring at home and witnessed apneas by wife. Excessive daytime somnolence.heavy snoring at home and witnessed apneas by wife. Excessive daytime somnolence.  Seen for hemoglobin on higher side    PAST MEDICAL & SURGICAL HISTORY:  No pertinent past medical history      No significant past surgical history        Allergies    No Known Allergies    Intolerances      Social History:    Medications:  aspirin  chewable 81 milliGRAM(s) Oral daily  atorvastatin 20 milliGRAM(s) Oral at bedtime  chlorhexidine 2% Cloths 1 Application(s) Topical daily  furosemide    Tablet 40 milliGRAM(s) Oral daily  heparin   Injectable 5000 Unit(s) SubCutaneous every 12 hours  influenza   Vaccine 0.5 milliLiter(s) IntraMuscular once  metoprolol succinate ER 25 milliGRAM(s) Oral daily  sacubitril 24 mG/valsartan 26 mG 1 Tablet(s) Oral two times a day  spironolactone 25 milliGRAM(s) Oral daily    Labs:  CBC Full  -  ( 13 Mar 2023 06:15 )  WBC Count : 5.90 K/uL  RBC Count : 6.05 M/uL  Hemoglobin : 17.7 g/dL  Hematocrit : 52.7 %  Platelet Count - Automated : 308 K/uL  Mean Cell Volume : 87.1 fl  Mean Cell Hemoglobin : 29.3 pg  Mean Cell Hemoglobin Concentration : 33.6 gm/dL  Auto Neutrophil # : x  Auto Lymphocyte # : x  Auto Monocyte # : x  Auto Eosinophil # : x  Auto Basophil # : x  Auto Neutrophil % : x  Auto Lymphocyte % : x  Auto Monocyte % : x  Auto Eosinophil % : x  Auto Basophil % : x    03-14    139  |  105  |  18  ----------------------------<  98  3.5   |  23  |  1.11    Ca    8.9      14 Mar 2023 06:51  Phos  3.3     03-12        Radiology:             ROS:  Patient comfortable without distress  No SOB or chest pain  No palpitation  No abdominal pain, diarrhaea or constipation  No weakness of extremities  No skin changes or swelling of legs  Rest of the comprehensive ROS was negative  Vital Signs Last 24 Hrs  T(C): 36.6 (14 Mar 2023 08:00), Max: 36.7 (13 Mar 2023 14:00)  T(F): 97.9 (14 Mar 2023 08:00), Max: 98 (13 Mar 2023 14:00)  HR: 90 (14 Mar 2023 08:00) (78 - 92)  BP: 118/80 (14 Mar 2023 08:00) (100/75 - 132/98)  BP(mean): 96 (13 Mar 2023 12:45) (85 - 109)  RR: 18 (14 Mar 2023 08:00) (16 - 18)  SpO2: 96% (14 Mar 2023 08:00) (91% - 99%)    Parameters below as of 14 Mar 2023 08:00  Patient On (Oxygen Delivery Method): room air        Physical exam:  Patient alert and oriented  No distress  CVS: S1, S2  Chest: bilateral breath sound without rales  Abdomen: soft, not tender, no organomegaly or masses  CNS: No focal neuro deficit  Musculoskeletal:  Normal range of motion  Skin: No rash    Assessment and Plan:

## 2023-03-14 NOTE — DISCHARGE NOTE NURSING/CASE MANAGEMENT/SOCIAL WORK - NSDCPEEMAIL_GEN_ALL_CORE
[facility] for Tobacco Control email [email or web address]

## 2023-03-14 NOTE — DISCHARGE NOTE NURSING/CASE MANAGEMENT/SOCIAL WORK - NSDCPEFALRISK_GEN_ALL_CORE
For information on Fall & Injury Prevention, visit: [email or web address] OR  [email or web address] OR  https://www.cdc.gov/steadi/patient.html

## 2023-03-14 NOTE — PROGRESS NOTE ADULT - PROVIDER SPECIALTY LIST ADULT
Cardiology
Internal Medicine
Internal Medicine
Cardiology
Heme/Onc
Hospitalist
Internal Medicine

## 2023-03-14 NOTE — PROGRESS NOTE ADULT - ASSESSMENT
38  yr year-old male      witn no pmh       presents with shortness of breath, cough, and chest pain .  for  past  week or more   symptoms have been getting progressively worse./  denies  fevers or chills.   pt   [place]  has  his  own  business/  cleaning company       *  c/o  sob  , for   past  week opr  more.  no  cp . with  acute  resp  failure  on  arrival  from chf      hypoxic on arrival.  with sinus tach.  was  on bipap     Ct chest angio, no pe . +.  chf     *  sob,  from acute   diastolic chf       on iv   lasix      s/p  WCT .  on torpol, asa/ lipitor /  entresto       echo, low  ef   Hb  is  18/  heme vandana  d r ohri,   Ronen 2 ordered/ ?  secondary  polycythemia    cardiac cath,  non obstructive.  wedge  34    on iv lasix  bid  now /  heart   failure team  f/p      on dvt ppx        ra< from: CT Angio Chest PE Protocol w/ IV Cont (03.10.23 @ 15:40) >  MPRESSION:  No pulmonary embolism.  Pulmonary edema with trace pleural effusions in thesetting of   cardiomegaly.  --- End of Report ---  < end of copied text >   38  yr year-old male no PMH presents with shortness of breath, cough, and chest pain for  past  week or more   symptoms have been getting progressively worse./  denies  fevers or chills.       [place] ago[place] has  his  own  business/  cleaning company  c/o  sob  , for   past  week opr  more.  no  cp . with  acute  resp  failure  on  arrival  from chf   hypoxic on arrival.  with sinus tach.  was  on bipap  Ct chest angio, no pe, confirms CHF.     SOB form acute systolic chf     On IV lasix, change to PO soon.     s/p  WCT, on torpol, asa/ lipitor /  entresto    Cath, clean coronaries, no stents needed.     Check cardiac MRI then discharge home, possible viral cardiomyopathy. Does Vaping as well.

## 2023-03-14 NOTE — DISCHARGE NOTE NURSING/CASE MANAGEMENT/SOCIAL WORK - NSDCFUADDAPPT_GEN_ALL_CORE_FT
Patient should follow up with our pulmonary and sleep team after discharge:  This patient will need to close hospital follow up after discharge with the pulmonary team:  Follow up with Dr Dubon. The appointment should be within 1 week of discharge from the hospital.    Pulmonary/Sleep Clinic  [address] [place] [address]  [phone]

## 2023-03-14 NOTE — DISCHARGE NOTE NURSING/CASE MANAGEMENT/SOCIAL WORK - PATIENT PORTAL LINK FT
You can access the FollowMyHealth Patient Portal offered by [facility] by registering at the following website: [email or web address]. By joining [employer]’s FollowMyHealth portal, you will also be able to view your health information using other applications (apps) compatible with our system.

## 2023-03-15 LAB — EPO SERPL-MCNC: 9.8 MIU/ML — SIGNIFICANT CHANGE UP (ref 2.6–18.5)

## 2023-03-16 LAB — JAK2 P.V617F BLD/T QL: SIGNIFICANT CHANGE UP

## 2023-03-17 PROBLEM — Z00.00 ENCOUNTER FOR PREVENTIVE HEALTH EXAMINATION: Status: ACTIVE | Noted: 2023-03-17

## 2023-03-17 PROBLEM — Z78.9 OTHER SPECIFIED HEALTH STATUS: Chronic | Status: ACTIVE | Noted: 2023-03-10

## 2023-03-21 RX ORDER — ATORVASTATIN CALCIUM 20 MG/1
20 TABLET, FILM COATED ORAL
Refills: 0 | Status: ACTIVE | COMMUNITY
Start: 2023-03-21

## 2023-03-21 RX ORDER — SPIRONOLACTONE 50 MG/1
50 TABLET ORAL TWICE DAILY
Qty: 30 | Refills: 0 | Status: ACTIVE | COMMUNITY
Start: 2023-03-21

## 2023-03-21 RX ORDER — METOPROLOL TARTRATE 25 MG/1
25 TABLET, FILM COATED ORAL TWICE DAILY
Qty: 60 | Refills: 3 | Status: ACTIVE | COMMUNITY
Start: 2023-03-21

## 2023-03-21 RX ORDER — FUROSEMIDE 40 MG/1
40 TABLET ORAL
Qty: 90 | Refills: 3 | Status: ACTIVE | COMMUNITY
Start: 2023-03-21

## 2023-03-21 RX ORDER — VALSARTAN 40 MG/1
40 TABLET, COATED ORAL TWICE DAILY
Refills: 0 | Status: ACTIVE | COMMUNITY
Start: 2023-03-21

## 2023-03-21 RX ORDER — ASPIRIN 81 MG/1
81 TABLET, COATED ORAL DAILY
Refills: 0 | Status: ACTIVE | COMMUNITY
Start: 2023-03-21

## 2023-03-22 ENCOUNTER — NON-APPOINTMENT (OUTPATIENT)
Age: 38
End: 2023-03-22

## 2023-03-22 ENCOUNTER — APPOINTMENT (OUTPATIENT)
Dept: HEART FAILURE | Facility: CLINIC | Age: 38
End: 2023-03-22

## 2023-03-22 NOTE — CARDIOLOGY SUMMARY
[de-identified] : \par 3/12/23: LVIDd 5.5 cm, LVEF 35%, normal RV size and function, normal biatria, no evidence of pulmonary hypertension (not quantified)\par  [de-identified] : \par 3/14/23 CMR: severely enlarged LV size (LVEDVi 134 mL/m2), LVEF 24%. Normal RV size with RVEF 38%. mod L and mild R atrial enlargement. Extensive, near circumferential mid myocardial and subepicardial LGE throughout the basal to apical LV with areas of associated pericardial enhancement. Findings consistent with a nonischemic etiology, with considerations including myocarditis and sarcoidosis in the appropriate clinical setting. \par  [de-identified] : \par 3/13/23 RHC: /89 (106), HR 80, RA 7, RV 57/14, PA 53/33/42, PCWP 30, AO 96%, PA 68%, CO/CI (F) 4.45/2.05, SVR 1778 dsc, PVR 0.9 salazar\par \par 3/13/23 LHC: minor irregularities of RCA and Cx, otherwise no disease

## 2023-03-22 NOTE — HISTORY OF PRESENT ILLNESS
[FreeTextEntry1] : Mr. Torres is a 38 year old man from [facility], daily ETOH use (6 shots of Vodka) who had no known PMH prior to diagnosis of NICM (LVEF 24% by CMR, LVIDd 5.5 cm) in March 2023, presenting for discharge follow-up today. \par \par He was admitted to [facility] 3/10 - 3/14/23, presenting with SOB and cough found to be in ADHF with new finding of LVEF of 35%. He was diuresed and underwent R/LHC revealing, no obstructive CAD, elevated L sided pressures and marginal CI of 2 in the setting of elevated SVR of 1770. CMR obtained to further evaluate etiology, notable for extensive LGE with concern for sarcoid and LVEF of 24%. Tolerated introduction of low dose GDMT and discharged at a weight of 221.1 lbs. \par \par \par \par Exam:\par \par \par Plan:\par will arrange for PET CT given suspicion of Sarcoid \par would like to start Farxiga if has insurance and empirically stop Lasix 40 mg QD\par Valsartan 40 mg BID, would like to increase if BP allows (or transition to Entresto if has insurance)\par Lopressor 12.5 BID, would like to transition to Toprol XL 25 mg QD\par Spironolactone 25 mg BID\par follow-up with NP in 3 weeks\par follow-up with Dr. Castillo in 6-8 weeks\par \par \par

## 2023-05-30 NOTE — PATIENT PROFILE ADULT - HISTORY OF COVID-19 VACCINATION
pt presents for increase liver test. pt reports recently noted increased GGT. pt reports no prior known h/o increased LFT's. pt denies prior diet or med changes. No known h/o increased LFT's. pt denies diet or med changes. No other complaints. pt denies alcohol or other risk factors for chronic liver disease.
Vaccine status unknown

## 2024-07-12 ENCOUNTER — RESULT REVIEW (OUTPATIENT)
Age: 39
End: 2024-07-12

## 2024-07-12 ENCOUNTER — INPATIENT (INPATIENT)
Facility: HOSPITAL | Age: 39
LOS: 3 days | Discharge: HOME | DRG: 293 | End: 2024-07-16
Attending: INTERNAL MEDICINE | Admitting: HOSPITALIST
Payer: MEDICAID

## 2024-07-12 VITALS
TEMPERATURE: 98 F | OXYGEN SATURATION: 98 % | RESPIRATION RATE: 20 BRPM | HEART RATE: 82 BPM | DIASTOLIC BLOOD PRESSURE: 88 MMHG | SYSTOLIC BLOOD PRESSURE: 131 MMHG

## 2024-07-12 DIAGNOSIS — I10 ESSENTIAL (PRIMARY) HYPERTENSION: ICD-10-CM

## 2024-07-12 DIAGNOSIS — I50.23 ACUTE ON CHRONIC SYSTOLIC (CONGESTIVE) HEART FAILURE: ICD-10-CM

## 2024-07-12 DIAGNOSIS — G47.33 OBSTRUCTIVE SLEEP APNEA (ADULT) (PEDIATRIC): ICD-10-CM

## 2024-07-12 DIAGNOSIS — I42.8 OTHER CARDIOMYOPATHIES: ICD-10-CM

## 2024-07-12 DIAGNOSIS — Z29.9 ENCOUNTER FOR PROPHYLACTIC MEASURES, UNSPECIFIED: ICD-10-CM

## 2024-07-12 DIAGNOSIS — I50.9 HEART FAILURE, UNSPECIFIED: ICD-10-CM

## 2024-07-12 LAB
ALBUMIN SERPL ELPH-MCNC: 4.1 G/DL — SIGNIFICANT CHANGE UP (ref 3.3–5)
ALP SERPL-CCNC: 75 U/L — SIGNIFICANT CHANGE UP (ref 40–120)
ALT FLD-CCNC: 42 U/L — SIGNIFICANT CHANGE UP (ref 10–45)
ANION GAP SERPL CALC-SCNC: 11 MMOL/L — SIGNIFICANT CHANGE UP (ref 5–17)
APTT BLD: 28.6 SEC — SIGNIFICANT CHANGE UP (ref 24.5–35.6)
AST SERPL-CCNC: 18 U/L — SIGNIFICANT CHANGE UP (ref 10–40)
BASE EXCESS BLDV CALC-SCNC: 3.6 MMOL/L — HIGH (ref -2–3)
BASOPHILS # BLD AUTO: 0.03 K/UL — SIGNIFICANT CHANGE UP (ref 0–0.2)
BASOPHILS NFR BLD AUTO: 0.5 % — SIGNIFICANT CHANGE UP (ref 0–2)
BILIRUB SERPL-MCNC: 0.4 MG/DL — SIGNIFICANT CHANGE UP (ref 0.2–1.2)
BUN SERPL-MCNC: 13 MG/DL — SIGNIFICANT CHANGE UP (ref 7–23)
CA-I SERPL-SCNC: 1.25 MMOL/L — SIGNIFICANT CHANGE UP (ref 1.15–1.33)
CALCIUM SERPL-MCNC: 9.8 MG/DL — SIGNIFICANT CHANGE UP (ref 8.4–10.5)
CHLORIDE BLDV-SCNC: 102 MMOL/L — SIGNIFICANT CHANGE UP (ref 96–108)
CHLORIDE SERPL-SCNC: 101 MMOL/L — SIGNIFICANT CHANGE UP (ref 96–108)
CO2 BLDV-SCNC: 31 MMOL/L — HIGH (ref 22–26)
CO2 SERPL-SCNC: 25 MMOL/L — SIGNIFICANT CHANGE UP (ref 22–31)
CREAT SERPL-MCNC: 1.43 MG/DL — HIGH (ref 0.5–1.3)
EGFR: 64 ML/MIN/1.73M2 — SIGNIFICANT CHANGE UP
EOSINOPHIL # BLD AUTO: 0.22 K/UL — SIGNIFICANT CHANGE UP (ref 0–0.5)
EOSINOPHIL NFR BLD AUTO: 3.9 % — SIGNIFICANT CHANGE UP (ref 0–6)
GAS PNL BLDV: 134 MMOL/L — LOW (ref 136–145)
GAS PNL BLDV: SIGNIFICANT CHANGE UP
GLUCOSE BLDC GLUCOMTR-MCNC: 143 MG/DL — HIGH (ref 70–99)
GLUCOSE BLDV-MCNC: 131 MG/DL — HIGH (ref 70–99)
GLUCOSE SERPL-MCNC: 133 MG/DL — HIGH (ref 70–99)
HCO3 BLDV-SCNC: 30 MMOL/L — HIGH (ref 22–29)
HCT VFR BLD CALC: 46.4 % — SIGNIFICANT CHANGE UP (ref 39–50)
HCT VFR BLDA CALC: 48 % — SIGNIFICANT CHANGE UP (ref 39–51)
HGB BLD CALC-MCNC: 15.9 G/DL — SIGNIFICANT CHANGE UP (ref 12.6–17.4)
HGB BLD-MCNC: 15.1 G/DL — SIGNIFICANT CHANGE UP (ref 13–17)
IMM GRANULOCYTES NFR BLD AUTO: 0.2 % — SIGNIFICANT CHANGE UP (ref 0–0.9)
INR BLD: 1.07 RATIO — SIGNIFICANT CHANGE UP (ref 0.85–1.18)
LACTATE BLDV-MCNC: 1.3 MMOL/L — SIGNIFICANT CHANGE UP (ref 0.5–2)
LYMPHOCYTES # BLD AUTO: 2.22 K/UL — SIGNIFICANT CHANGE UP (ref 1–3.3)
LYMPHOCYTES # BLD AUTO: 38.9 % — SIGNIFICANT CHANGE UP (ref 13–44)
MCHC RBC-ENTMCNC: 28.1 PG — SIGNIFICANT CHANGE UP (ref 27–34)
MCHC RBC-ENTMCNC: 32.5 GM/DL — SIGNIFICANT CHANGE UP (ref 32–36)
MCV RBC AUTO: 86.2 FL — SIGNIFICANT CHANGE UP (ref 80–100)
MONOCYTES # BLD AUTO: 0.57 K/UL — SIGNIFICANT CHANGE UP (ref 0–0.9)
MONOCYTES NFR BLD AUTO: 10 % — SIGNIFICANT CHANGE UP (ref 2–14)
NEUTROPHILS # BLD AUTO: 2.66 K/UL — SIGNIFICANT CHANGE UP (ref 1.8–7.4)
NEUTROPHILS NFR BLD AUTO: 46.5 % — SIGNIFICANT CHANGE UP (ref 43–77)
NRBC # BLD: 0 /100 WBCS — SIGNIFICANT CHANGE UP (ref 0–0)
NT-PROBNP SERPL-SCNC: 478 PG/ML — HIGH (ref 0–300)
PCO2 BLDV: 49 MMHG — SIGNIFICANT CHANGE UP (ref 42–55)
PH BLDV: 7.39 — SIGNIFICANT CHANGE UP (ref 7.32–7.43)
PLATELET # BLD AUTO: 250 K/UL — SIGNIFICANT CHANGE UP (ref 150–400)
PO2 BLDV: 40 MMHG — SIGNIFICANT CHANGE UP (ref 25–45)
POTASSIUM BLDV-SCNC: 3.3 MMOL/L — LOW (ref 3.5–5.1)
POTASSIUM SERPL-MCNC: 3.2 MMOL/L — LOW (ref 3.5–5.3)
POTASSIUM SERPL-SCNC: 3.2 MMOL/L — LOW (ref 3.5–5.3)
PROT SERPL-MCNC: 7.5 G/DL — SIGNIFICANT CHANGE UP (ref 6–8.3)
PROTHROM AB SERPL-ACNC: 11.7 SEC — SIGNIFICANT CHANGE UP (ref 9.5–13)
RBC # BLD: 5.38 M/UL — SIGNIFICANT CHANGE UP (ref 4.2–5.8)
RBC # FLD: 13.3 % — SIGNIFICANT CHANGE UP (ref 10.3–14.5)
SAO2 % BLDV: 61.3 % — LOW (ref 67–88)
SODIUM SERPL-SCNC: 137 MMOL/L — SIGNIFICANT CHANGE UP (ref 135–145)
TROPONIN T, HIGH SENSITIVITY RESULT: 17 NG/L — SIGNIFICANT CHANGE UP (ref 0–51)
TROPONIN T, HIGH SENSITIVITY RESULT: 17 NG/L — SIGNIFICANT CHANGE UP (ref 0–51)
WBC # BLD: 5.71 K/UL — SIGNIFICANT CHANGE UP (ref 3.8–10.5)
WBC # FLD AUTO: 5.71 K/UL — SIGNIFICANT CHANGE UP (ref 3.8–10.5)

## 2024-07-12 PROCEDURE — 71045 X-RAY EXAM CHEST 1 VIEW: CPT | Mod: 26

## 2024-07-12 PROCEDURE — 99223 1ST HOSP IP/OBS HIGH 75: CPT

## 2024-07-12 PROCEDURE — 93306 TTE W/DOPPLER COMPLETE: CPT | Mod: 26

## 2024-07-12 PROCEDURE — 93356 MYOCRD STRAIN IMG SPCKL TRCK: CPT

## 2024-07-12 PROCEDURE — 99285 EMERGENCY DEPT VISIT HI MDM: CPT

## 2024-07-12 RX ORDER — FUROSEMIDE 10 MG/ML
40 INJECTION, SOLUTION INTRAMUSCULAR; INTRAVENOUS DAILY
Refills: 0 | Status: DISCONTINUED | OUTPATIENT
Start: 2024-07-12 | End: 2024-07-13

## 2024-07-12 RX ORDER — SPIRONOLACTONE 25 MG/1
25 TABLET ORAL DAILY
Refills: 0 | Status: DISCONTINUED | OUTPATIENT
Start: 2024-07-12 | End: 2024-07-16

## 2024-07-12 RX ORDER — HEPARIN SODIUM 50 [USP'U]/ML
5000 INJECTION, SOLUTION INTRAVENOUS EVERY 8 HOURS
Refills: 0 | Status: DISCONTINUED | OUTPATIENT
Start: 2024-07-12 | End: 2024-07-16

## 2024-07-12 RX ORDER — SACUBITRIL AND VALSARTAN 97; 103 MG/1; MG/1
1 TABLET, FILM COATED ORAL
Refills: 0 | Status: DISCONTINUED | OUTPATIENT
Start: 2024-07-12 | End: 2024-07-16

## 2024-07-12 RX ORDER — METOPROLOL TARTRATE 50 MG
12.5 TABLET ORAL
Refills: 0 | Status: DISCONTINUED | OUTPATIENT
Start: 2024-07-12 | End: 2024-07-13

## 2024-07-12 RX ORDER — POTASSIUM CHLORIDE 600 MG/1
40 TABLET, FILM COATED, EXTENDED RELEASE ORAL ONCE
Refills: 0 | Status: COMPLETED | OUTPATIENT
Start: 2024-07-12 | End: 2024-07-12

## 2024-07-12 RX ORDER — FUROSEMIDE 10 MG/ML
40 INJECTION, SOLUTION INTRAMUSCULAR; INTRAVENOUS ONCE
Refills: 0 | Status: COMPLETED | OUTPATIENT
Start: 2024-07-12 | End: 2024-07-12

## 2024-07-12 RX ADMIN — HEPARIN SODIUM 5000 UNIT(S): 50 INJECTION, SOLUTION INTRAVENOUS at 21:03

## 2024-07-12 RX ADMIN — FUROSEMIDE 40 MILLIGRAM(S): 10 INJECTION, SOLUTION INTRAMUSCULAR; INTRAVENOUS at 05:26

## 2024-07-12 RX ADMIN — POTASSIUM CHLORIDE 40 MILLIEQUIVALENT(S): 600 TABLET, FILM COATED, EXTENDED RELEASE ORAL at 04:41

## 2024-07-12 RX ADMIN — Medication 12.5 MILLIGRAM(S): at 17:10

## 2024-07-12 RX ADMIN — SACUBITRIL AND VALSARTAN 1 TABLET(S): 97; 103 TABLET, FILM COATED ORAL at 17:10

## 2024-07-12 NOTE — H&P ADULT - NSHPSOCIALHISTORY_GEN_ALL_CORE
, works in loading-unloading, no children  Drinks Vodka each week and denies smoking cigarettes and vaping

## 2024-07-12 NOTE — PATIENT PROFILE ADULT - FALL HARM RISK - UNIVERSAL INTERVENTIONS
Bed in lowest position, wheels locked, appropriate side rails in place/Call bell, personal items and telephone in reach/Instruct patient to call for assistance before getting out of bed or chair/Non-slip footwear when patient is out of bed/[place] to call system/Physically safe environment - no spills, clutter or unnecessary equipment/Purposeful Proactive Rounding/Room/bathroom lighting operational, light cord in reach

## 2024-07-12 NOTE — ED ADULT TRIAGE NOTE - SOURCE OF INFORMATION
Ann Marie,     Could you please call Tiera and let her know her CXR is normal, with no sign of pneumonia, and see how she is feeling today? Thanks so much!  
Patient

## 2024-07-12 NOTE — H&P ADULT - PROBLEM SELECTOR PLAN 3
BP has been poorly controlled, Last /100  - Resume metoprolol Er 25mg, Aldactone 25mg, Entresto 24-26mg/d

## 2024-07-12 NOTE — ED PROVIDER NOTE - WR ORDER STATUS 1
Laceration: Skin Adhesive  A laceration is a cut through the skin. You have a laceration that has been closed with skin adhesive, a type of skin glue.  Home care  Acetaminophen or ibuprofen may be taken for pain, unless another pain medicine was prescribed.  If you have chronic liver or kidney disease or ever had a stomach ulcer or gastrointestinal bleeding, talk with your healthcare provider before using these medications.  General care guidelines include:  · Keep the wound clean and dry. You may shower or bathe as usual, but do not use soaps, lotions, or ointments on the wound area. Do not scrub the wound. After bathing, pat the wound dry with a soft towel.  · Do not scratch, rub, or pick at the adhesive film. Do not place tape directly over the film.  · Do not apply liquids (such as peroxide), ointments, or creams to the wound while the film is in place.  · Most skin wounds heal without problems. However, an infection sometimes occurs despite proper treatment. Therefore, watch for the signs of infection listed below.  Follow-up care  Follow up with your healthcare provider as directed by the doctor or staff. The adhesive film will fall off in 5 to 7 days.  When to seek medical care  Get prompt medical attention if any of the following occur:  · Signs of infection:  ¨ Fever of 100.4ºF (38ºC) or higher, or as directed by your health care provider  ¨ Increasing pain in the wound  ¨ Increasing redness or swelling  ¨ Pus coming from the wound  · Wound bleeds more than a small amount or bleeding doesn’t stop  · Wound edges come apart  · You feel numbness or weakness in the wound area that doesn’t go away  © 2000-2015 [employer]. [address] [place] [address], [place] [address]. All rights reserved. This information is not intended as a substitute for professional medical care. Always follow your healthcare professional's instructions.          Scalp Contusion  A contusion is another word for bruise. It  develops when small blood vessels break open and leak blood into the nearby area. A scalp contusion can result from a bump, hit, or fall. Symptoms can include changes in skin color (bruising). For instance, the skin may turn blue or black. Swelling and pain may also occur.  The swelling from the contusion should go down in a few days. Bruising and pain may take longer to go away.   Home care  General care  · You may use acetaminophen to control pain, unless another pain medicine was prescribed. Don’t take aspirin or NSAIDs (nonsteroidal anti-inflammatory drugs) without talking to your provider first. These medicines increase the risk of bleeding.  · To help reduce swelling and pain, apply a cold source to the injured area for up to 20 minutes at a time. Do this as often as directed. Use a cold pack or bag of ice wrapped in a thin towel. Never put a cold source directly on your skin.  · If you have cuts or scrapes around the site of the contusion, be sure to care for them as directed.  Note about concussion  Because the injury was to your head, it is possible that a concussion (mild brain injury) could result. You don’t have symptoms of a concussion at this time. But these can show up later. For this reason, you may be told to watch for symptoms of concussion once you’re home. Seek emergency medical care if you have any of the symptoms below over the next hours to days:  · Headache  · Nausea or vomiting  · Dizziness  · Sensitivity to light or noise  · Unusual sleepiness or grogginess  · Trouble falling asleep  · Personality changes  · Vision changes  · Memory loss  · Confusion  · Trouble walking or clumsiness  · Loss of consciousness (even for a short time)  · Inability to be awakened  During the time period that you’re watching for concussion symptoms:  · Don’t drink alcohol or use sedatives or medicines that make you sleepy.  · Don’t drive or operate machinery.  · Don’t do anything strenuous, such as heavy lifting  or straining.  · Limit tasks that require concentration. This includes reading, watching TV, using a smartphone or computer, and playing video games.  · Don’t return to sports, exercise, or other activity that could result in another injury.  Ask your healthcare provider when you can safely resume these activities.   Follow-up care  Follow up with your healthcare provider, or as directed. If imaging tests were done, they will be reviewed by a doctor. You will be told the results and any new findings that may affect your care.  When to seek medical advice  Call your healthcare provider right away if any of these occur:  · Pain that worsens or that can’t be relieved with medicines  · New or increased swelling or bruising  · Fever of 100.4°F (38°C) or higher, or as directed by your provider  · Redness, warmth, or drainage from the injured area  · Any depression or bony abnormality in the injured area  · Fluid drainage or bleeding from the nose or ears  Call 911  Call 911 right away if any of these occur:  · Stiff neck  · Weakness or numbness in any part of the body  · Seizures       © 1408-1805 [employer]. [address] [place] [address]. All rights reserved. This information is not intended as a substitute for professional medical care. Always follow your healthcare professional's instructions.          Neck Sprain or Strain  A sudden force that causes turning or bending of the neck can cause sprain or strain. An example would be the force from a car accident. This can stretch or tear muscles called a strain. It can also stretch or tear ligaments called a sprain. Either of these can cause neck pain. Sometimes neck pain occurs after a simple awkward movement. In either case, muscle spasm is commonly present and contributes to the pain.    Unless you had a forceful physical injury (for example, a car accident or fall), X-rays are usually not ordered for the initial evaluation of neck pain. If  pain continues and dose not respond to medical treatment, X-rays and other tests may be performed at a later time.  Home care  · You may feel more soreness and spasm the first few days after the injury. Rest until symptoms begin to improve.  · When lying down, use a comfortable pillow or a rolled towel that supports the head and keeps the spine in a neutral position. The position of the head should not be tilted forward or backward.  · Apply an ice pack over the injured area for 15 to 20 minutes every 3 to 6 hours. You should do this for the first 24 to 48 hours. You can make an ice pack by filling a plastic bag that seals at the top with ice cubes and then wrapping it with a thin towel. After 48 hours, apply heat (warm shower or warm bath) for 15 to 20 minutes several times a day, or alternate ice and heat.  · You may use over-the-counter pain medicine to control pain, unless another pain medicine was prescribed. If you have chronic liver or kidney disease or ever had a stomach ulcer or GI bleeding, talk with your healthcare provider before using these medicines.  · If a soft cervical collar was prescribed, it should be worn only for periods of increased pain. It should not be worn for more than 3 hours a day, or for a period longer than 1 to 2 weeks.  Follow-up care  Follow up with your healthcare provider as directed. Physical therapy may be needed.  Sometimes fractures don’t show up on the first X-ray. Bruises and sprains can sometimes hurt as much as a fracture. These injuries can take time to heal completely. If your symptoms don’t improve or they get worse, talk with your healthcare provider. You may need a repeat X-ray or other tests. If X-rays were taken, you will be told of any new findings that may affect your care.  Call 911  Call 911 if you have:   · Neck swelling, difficulty or painful swallowing  · Difficulty breathing  · Chest pain  When to seek medical advice  Call your healthcare provider right  away if any of these occur:  · Pain becomes worse or spreads into your arms  · Weakness or numbness in one or both arms  © 2749-2059 [employer]. [address] [place] [address], [place] [address]. All rights reserved. This information is not intended as a substitute for professional medical care. Always follow your healthcare professional's instructions.         Performed Resulted

## 2024-07-12 NOTE — PATIENT PROFILE ADULT - NSTOBACCOCESSATIONEDU3_GEN_A_NUR
98.1
Learning behavioral activities to cope with urges.  For example, distraction and changing routines

## 2024-07-12 NOTE — ED ADULT NURSE NOTE - OBJECTIVE STATEMENT
39YOM hx HTN/JEFF/CHF BIB self c/o chest pain and SOB x4 hours, pt states he was hospitalized for similar incident in march 2023, states he is compliant with all of his home meds. Pt states chest pain was constant and mostly in L upper chest, did not radiate anywhere. AOx4, tachypneic, lung sounds CTA b/l bases, endorses trouble catching his breath, peripheral pulses present x4, denies current chest pain/nausea/vomiting/fever/chills. VSS, NSR on EKG.

## 2024-07-12 NOTE — ED PROVIDER NOTE - OBJECTIVE STATEMENT
Simple: Patient demonstrates quick and easy understanding The patient is a 40 y/o male with a PMHx of CHF, HTN, JEFF presenting with a 4 hour history of chest pain with associated SOB, no radiation, squeezing in quality and 8/10 in severity. The patient denies currently having chest pain but states he has had a cough for the last few months. He states he took his Lasix this morning but is not compliant with regularly scheduled medications. Past history is pertinent for hospitalization in March 2023 for similar symptoms, which the patient states feel similar to today's. Cardiac MRI at the time revealed LVEF of 24%. The patient denies fever, recent URI, N/V/D, leg swelling.

## 2024-07-12 NOTE — ED PROVIDER NOTE - CLINICAL SUMMARY MEDICAL DECISION MAKING FREE TEXT BOX
The patient is a 40 y/o male with a PMHx of CHF, HTN, JEFF presenting with a 4 hour history of chest pain with associated SOB, no radiation, squeezing in quality and 8/10 in severity. The patient denies currently having chest pain but states he has had a cough for the last few months. He states he took his Lasix this morning but is not compliant with regularly scheduled medications. Past history is pertinent for hospitalization in March 2023 for similar symptoms, which the patient states feel similar to today's. Cardiac MRI at the time revealed LVEF of 24%. The patient denies fever, recent URI, N/V/D, leg swelling.    Vital signs stable. Differential diagnoses include but are not limited to CHF exacerbation vs. ACS vs. PE. Plan for CBC, CMP, Pro-BNP, troponins, VBG, CXR, cardiac monitoring. Will evaluate testing results to establish need for diuresis. Likelihood of PE is low due to lack of physical exam findings, risk factors, recent immobilization.

## 2024-07-12 NOTE — ED ADULT NURSE REASSESSMENT NOTE - NS ED NURSE REASSESS COMMENT FT1
WAR room tech notified of continuous pulse oximetry monitoring. Instructed to notify for under 92% SpO2.

## 2024-07-12 NOTE — H&P ADULT - HISTORY OF PRESENT ILLNESS
The patient is a 39 M with h/o NICM ( EF 35% in March 2023), HTN, JEFF, noncompliant on medications presenting with SOB on exertion and movements lately, some chest discomfort with no radiation, sweats, N/V for about a week. He took Lasix this morning but is not compliant with regularly scheduled medications- Aldactone, Entresto, Metoprolol. Denies recent travel, fever but some dry cough when he is on AC fr hot weather. He gained about 20Lbs in last several months.  He was seen and evaluated by cardiologist in March 2023- Echo showed EF 35%, Cath- normal coronaries and cardiac MRI showed enlarged LV with EF 24%- nonischemic. The patient has been drinking Vodka shots each week and gave up smoking and Vaping since last year per statement. He doesn't have follow up with any PCP or cardiologist.

## 2024-07-12 NOTE — ED ADULT NURSE NOTE - AS PAIN REST
Refill requested for:     Venlafaxine 75mg    Last office visit:     09/27/2019  (Depression, stable on current meds. Continue current meds.)  OV Scheduled: 04/13/2020    Last refill:   09/27/2019 #90 +1    Medication refilled per protocol.   0 (no pain/absence of nonverbal indicators of pain)

## 2024-07-12 NOTE — H&P ADULT - NSICDXPASTMEDICALHX_GEN_ALL_CORE_FT
PAST MEDICAL HISTORY:  Alcohol abuse     Chronic systolic congestive heart failure     HTN (hypertension)     NICM (nonischemic cardiomyopathy)

## 2024-07-12 NOTE — H&P ADULT - PROBLEM SELECTOR PLAN 1
Patient has been noncompliant on meds- Metoprolol, Entresto, Aldactone. Has not seen PCP or cardiologist.  - In March 2023- Echo showed EF 35%, Cath- normal coronaries and cardiac MRI showed enlarged LV with EF 24%- nonischemic.  - Gayle 17->17  - c/w Lasix 40mg IV daily  - Resume Metoprolol 12.5mg bid, Aldactone 25mg/d, Entresto 24-26mg bid  - Echo result pending, lipid panel, TSH, HbA1C, HIV tests ordered  - Called Cardiologist ( Dr Pereira- saw on last admission)

## 2024-07-12 NOTE — H&P ADULT - NEUROLOGICAL
details… AAO x3, nonfocal/cranial nerves II-XII intact/sensation intact/responds to pain/responds to verbal commands/deep reflexes intact/cranial nerves intact/superficial reflexes intact

## 2024-07-12 NOTE — H&P ADULT - ASSESSMENT
The patient is a 39 M with h/o NICM ( EF 35% in March 2023), HTN, JEFF, noncompliant on medications presenting with SOB on exertion and movements lately, some chest discomfort with no radiation, sweats, N/V for about a week. He took Lasix this morning but is not compliant with regularly scheduled medications- Aldactone, Entresto, Metoprolol. Denies recent travel, fever but some dry cough when he is on AC fr hot weather. He was seen and evaluated by cardiologist in March 2023- Echo showed EF 35%, Cath- normal coronaries and cardiac MRI showed enlarged LV with EF 24%- nonischemic. The patient has been drinking Vodka shots each week and gave up smoking and Vaping since last year per statement. He doesn't have follow up with any PCP or cardiologist.    In ED his /88, O2 sat 97% RA, CXR- interstitial edema, , got IV Lasix 40mg x1 and KCL.

## 2024-07-12 NOTE — ED PROVIDER NOTE - CARE PLAN
PT IS AWAKE AND ALERT ALL THROUGHOUT THE DAY. BLOOD PRESSURE WITHIN THE
70s-90s. CELLULITIS TO BILATERAL LEGS ARE STILL PRESENT THESE ARE NOW COVERED
WITH DRESSING. PT HAS BEEN AFEBRILE THE ENTIRE DAY. 1 Principal Discharge DX:	CHF exacerbation   Principal Discharge DX:	Acute decompensated heart failure  Secondary Diagnosis:	RJ (acute kidney injury)

## 2024-07-12 NOTE — ED PROVIDER NOTE - ATTENDING CONTRIBUTION TO CARE
Attending (Gera Moss D.O.):  I have personally seen and examined this patient. I have performed a substantive portion of the visit including all aspects of the medical decision making. Resident note reviewed. I agree on the plan of care except where noted.    39M here for severe nonradiating chest pressure with shortness of breath that occurs when he is volume overloaded. Reports drinking 8 shots of vodka. Reports poor compliance with home medications and poor social support. Hx complicated by HF with recent cardiac MR showing HFrEF and hypokinesis, JEFF, HTN. Denies inf sxs. States has gained weight. Reports not taking diuretics at home.    Hemodynam stable, NAD no inc wob, no o2 req but sounds short of breath when talking. No e/o clinical anemia, jaundice, rash. + JVD. +rales at lung bases. RRR. +blowing murmur. Benign abd, no peritoneal signs. + trace Le edema. Neurovasc intact with full str/rom all 4 extrem.    Hx and exam concerning for decompensated HFrEF in a patient with poor social support and compliance, complicated by etoh use and possibility for w/d. Lots of volume in w/o much out. Check labs, cardiac biomarkers, EKG, CXR, place on cardiac monitor for telemetry monitoring. Will  give diuresis and will require admission for close monitoring given complex social determinants of health.

## 2024-07-12 NOTE — H&P ADULT - PROBLEM SELECTOR PLAN 2
In March 2023- Echo showed EF 35%, Cath- normal coronaries and cardiac MRI showed enlarged LV with EF 24%- nonischemic  - Possibly related to Alcohol/Viral or sarcoid. Advised to stop drinking and have outpatient sleep study  - Plans per cardiology

## 2024-07-13 DIAGNOSIS — Z29.9 ENCOUNTER FOR PROPHYLACTIC MEASURES, UNSPECIFIED: ICD-10-CM

## 2024-07-13 DIAGNOSIS — R00.0 TACHYCARDIA, UNSPECIFIED: ICD-10-CM

## 2024-07-13 LAB
A1C WITH ESTIMATED AVERAGE GLUCOSE RESULT: 5.9 % — HIGH (ref 4–5.6)
ANION GAP SERPL CALC-SCNC: 15 MMOL/L — SIGNIFICANT CHANGE UP (ref 5–17)
BUN SERPL-MCNC: 14 MG/DL — SIGNIFICANT CHANGE UP (ref 7–23)
CALCIUM SERPL-MCNC: 9 MG/DL — SIGNIFICANT CHANGE UP (ref 8.4–10.5)
CHLORIDE SERPL-SCNC: 104 MMOL/L — SIGNIFICANT CHANGE UP (ref 96–108)
CHOLEST SERPL-MCNC: 229 MG/DL — HIGH
CO2 SERPL-SCNC: 22 MMOL/L — SIGNIFICANT CHANGE UP (ref 22–31)
CREAT SERPL-MCNC: 1.2 MG/DL — SIGNIFICANT CHANGE UP (ref 0.5–1.3)
EGFR: 79 ML/MIN/1.73M2 — SIGNIFICANT CHANGE UP
ESTIMATED AVERAGE GLUCOSE: 123 MG/DL — HIGH (ref 68–114)
GLUCOSE SERPL-MCNC: 76 MG/DL — SIGNIFICANT CHANGE UP (ref 70–99)
HCT VFR BLD CALC: 49.4 % — SIGNIFICANT CHANGE UP (ref 39–50)
HDLC SERPL-MCNC: 48 MG/DL — SIGNIFICANT CHANGE UP
HGB BLD-MCNC: 16.9 G/DL — SIGNIFICANT CHANGE UP (ref 13–17)
HIV 1+2 AB+HIV1 P24 AG SERPL QL IA: SIGNIFICANT CHANGE UP
LIPID PNL WITH DIRECT LDL SERPL: 168 MG/DL — HIGH
MAGNESIUM SERPL-MCNC: 2 MG/DL — SIGNIFICANT CHANGE UP (ref 1.6–2.6)
MCHC RBC-ENTMCNC: 28.7 PG — SIGNIFICANT CHANGE UP (ref 27–34)
MCHC RBC-ENTMCNC: 34.2 GM/DL — SIGNIFICANT CHANGE UP (ref 32–36)
MCV RBC AUTO: 84 FL — SIGNIFICANT CHANGE UP (ref 80–100)
NON HDL CHOLESTEROL: 180 MG/DL — HIGH
NRBC # BLD: 0 /100 WBCS — SIGNIFICANT CHANGE UP (ref 0–0)
PHOSPHATE SERPL-MCNC: 3.1 MG/DL — SIGNIFICANT CHANGE UP (ref 2.5–4.5)
PLATELET # BLD AUTO: 259 K/UL — SIGNIFICANT CHANGE UP (ref 150–400)
POTASSIUM SERPL-MCNC: 3.5 MMOL/L — SIGNIFICANT CHANGE UP (ref 3.5–5.3)
POTASSIUM SERPL-SCNC: 3.5 MMOL/L — SIGNIFICANT CHANGE UP (ref 3.5–5.3)
RBC # BLD: 5.88 M/UL — HIGH (ref 4.2–5.8)
RBC # FLD: 13 % — SIGNIFICANT CHANGE UP (ref 10.3–14.5)
SODIUM SERPL-SCNC: 141 MMOL/L — SIGNIFICANT CHANGE UP (ref 135–145)
TRIGL SERPL-MCNC: 69 MG/DL — SIGNIFICANT CHANGE UP
TSH SERPL-MCNC: 0.97 UIU/ML — SIGNIFICANT CHANGE UP (ref 0.27–4.2)
WBC # BLD: 4.97 K/UL — SIGNIFICANT CHANGE UP (ref 3.8–10.5)
WBC # FLD AUTO: 4.97 K/UL — SIGNIFICANT CHANGE UP (ref 3.8–10.5)

## 2024-07-13 PROCEDURE — 93010 ELECTROCARDIOGRAM REPORT: CPT

## 2024-07-13 PROCEDURE — 99233 SBSQ HOSP IP/OBS HIGH 50: CPT

## 2024-07-13 RX ORDER — POTASSIUM CHLORIDE 600 MG/1
40 TABLET, FILM COATED, EXTENDED RELEASE ORAL ONCE
Refills: 0 | Status: COMPLETED | OUTPATIENT
Start: 2024-07-13 | End: 2024-07-13

## 2024-07-13 RX ORDER — POTASSIUM CHLORIDE 600 MG/1
40 TABLET, FILM COATED, EXTENDED RELEASE ORAL ONCE
Refills: 0 | Status: DISCONTINUED | OUTPATIENT
Start: 2024-07-13 | End: 2024-07-15

## 2024-07-13 RX ORDER — METOPROLOL TARTRATE 50 MG
25 TABLET ORAL EVERY 12 HOURS
Refills: 0 | Status: DISCONTINUED | OUTPATIENT
Start: 2024-07-13 | End: 2024-07-15

## 2024-07-13 RX ORDER — FUROSEMIDE 10 MG/ML
40 INJECTION, SOLUTION INTRAMUSCULAR; INTRAVENOUS EVERY 12 HOURS
Refills: 0 | Status: DISCONTINUED | OUTPATIENT
Start: 2024-07-13 | End: 2024-07-15

## 2024-07-13 RX ADMIN — SACUBITRIL AND VALSARTAN 1 TABLET(S): 97; 103 TABLET, FILM COATED ORAL at 05:14

## 2024-07-13 RX ADMIN — FUROSEMIDE 40 MILLIGRAM(S): 10 INJECTION, SOLUTION INTRAMUSCULAR; INTRAVENOUS at 05:15

## 2024-07-13 RX ADMIN — HEPARIN SODIUM 5000 UNIT(S): 50 INJECTION, SOLUTION INTRAVENOUS at 05:15

## 2024-07-13 RX ADMIN — POTASSIUM CHLORIDE 40 MILLIEQUIVALENT(S): 600 TABLET, FILM COATED, EXTENDED RELEASE ORAL at 09:12

## 2024-07-13 RX ADMIN — SACUBITRIL AND VALSARTAN 1 TABLET(S): 97; 103 TABLET, FILM COATED ORAL at 17:32

## 2024-07-13 RX ADMIN — Medication 25 MILLIGRAM(S): at 17:33

## 2024-07-13 RX ADMIN — FUROSEMIDE 40 MILLIGRAM(S): 10 INJECTION, SOLUTION INTRAMUSCULAR; INTRAVENOUS at 17:31

## 2024-07-13 RX ADMIN — SPIRONOLACTONE 25 MILLIGRAM(S): 25 TABLET ORAL at 05:14

## 2024-07-13 RX ADMIN — HEPARIN SODIUM 5000 UNIT(S): 50 INJECTION, SOLUTION INTRAVENOUS at 14:33

## 2024-07-13 RX ADMIN — Medication 12.5 MILLIGRAM(S): at 05:14

## 2024-07-13 RX ADMIN — HEPARIN SODIUM 5000 UNIT(S): 50 INJECTION, SOLUTION INTRAVENOUS at 22:04

## 2024-07-13 NOTE — CHART NOTE - NSCHARTNOTEFT_GEN_A_CORE
Alerted by RN patient with 8 beats of WCT. Telemetry reviewed showing 8 beats WCT up to 140 bpm. This is the patient's first event on this admission. Pt asymptomatic, sleeping at time of event.  Pt currently denies any complaints.     Vital Signs Last 24 Hrs  T(C): 36.7 (13 Jul 2024 03:26), Max: 36.9 (12 Jul 2024 06:21)  T(F): 98 (13 Jul 2024 03:26), Max: 98.4 (12 Jul 2024 06:21)  HR: 79 (13 Jul 2024 03:26) (56 - 83)  BP: 118/83 (13 Jul 2024 03:26) (118/83 - 146/95)  BP(mean): --  RR: 18 (13 Jul 2024 03:26) (18 - 18)  SpO2: 95% (13 Jul 2024 03:26) (94% - 98%)    Parameters below as of 13 Jul 2024 03:26  Patient On (Oxygen Delivery Method): room air                          15.1   5.71  )-----------( 250      ( 12 Jul 2024 03:20 )             46.4     07-12    137  |  101  |  13  ----------------------------<  133<H>  3.2<L>   |  25  |  1.43<H>    Ca    9.8      12 Jul 2024 03:20    TPro  7.5  /  Alb  4.1  /  TBili  0.4  /  DBili  x   /  AST  18  /  ALT  42  /  AlkPhos  75  07-12      Physical Exam:  General: WN/WD, NAD, nontoxic appearing  Neurology: A&Ox3, nonfocal, COPELAND x 4  Head:  Normocephalic, atraumatic  Respiratory: CTA B/L  CV: RRR, S1S2, no murmur  Abdominal: Soft, NT, ND no palpable mass  MSK: No edema, + peripheral pulses, FROM all 4 extremity    Assessment & Plan:  HPI:  The patient is a 39 M with h/o NICM ( EF 35% in March 2023), HTN, JEFF, noncompliant on medications presenting with SOB on exertion and movements lately, some chest discomfort with no radiation, sweats, N/V for about a week. He took Lasix this morning but is not compliant with regularly scheduled medications- Aldactone, Entresto, Metoprolol. Denies recent travel, fever but some dry cough when he is on AC fr hot weather. He gained about 20Lbs in last several months.  He was seen and evaluated by cardiologist in March 2023- Echo showed EF 35%, Cath- normal coronaries and cardiac MRI showed enlarged LV with EF 24%- nonischemic. The patient has been drinking Vodka shots each week and gave up smoking and Vaping since last year per statement. He doesn't have follow up with any PCP or cardiologist.   (12 Jul 2024 10:13)      Plan:  > EKG ordered, compared to previous EKG in 2023  > STAT BMP, Mg, Phos ordered. Will replete lytes as needed  >VSS, will continue to monitor  >Continue to monitor and observe patient  >Will endorse to primary team in AM  > Cardiology c/s in AM    Xiomara Pardo PA-C  Dept of Medicine

## 2024-07-13 NOTE — CONSULT NOTE ADULT - ASSESSMENT
The patient is a 39 M with h/o NICM ( EF 35% in March 2023), HTN, JEFF, noncompliant on medications presenting with SOB on exertion and movements lately, some chest discomfort with no radiation, sweats, N/V for about a week. He took Lasix this morning but is not compliant with regularly scheduled medications- Aldactone, Entresto, Metoprolol. Denies recent travel, fever but some dry cough when he is on AC fr hot weather. He gained about 20Lbs in last several months.  He was seen and evaluated by cardiologist in March 2023- Echo showed EF 35%, Cath- normal coronaries and cardiac MRI showed enlarged LV with EF 24%- nonischemic. The patient has been drinking Vodka shots each week and gave up smoking and Vaping since last year per statement  pt with NICM, full salazar was done before , non compliant to meds  continue all cardiac meds  CHF team eval for possibility of heart transplant LATER ON if needed  continue all cardiac meds, add Farxiga upon dc  pt will benefit from AICD  change metoprolol tartrate to Succinate

## 2024-07-13 NOTE — CONSULT NOTE ADULT - SUBJECTIVE AND OBJECTIVE BOX
Date of Service, 07-13-24 @ 10:16  CHIEF COMPLAINT:Patient is a 39y old  Male who presents with a chief complaint of Shortness of breath (12 Jul 2024 10:13)      HPI:  The patient is a 39 M with h/o NICM ( EF 35% in March 2023), HTN, JEFF, noncompliant on medications presenting with SOB on exertion and movements lately, some chest discomfort with no radiation, sweats, N/V for about a week. He took Lasix this morning but is not compliant with regularly scheduled medications- Aldactone, Entresto, Metoprolol. Denies recent travel, fever but some dry cough when he is on AC fr hot weather. He gained about 20Lbs in last several months.  He was seen and evaluated by cardiologist in March 2023- Echo showed EF 35%, Cath- normal coronaries and cardiac MRI showed enlarged LV with EF 24%- nonischemic. The patient has been drinking Vodka shots each week and gave up smoking and Vaping since last year per statement.         PAST MEDICAL & SURGICAL HISTORY:  NICM (nonischemic cardiomyopathy)  Chronic systolic congestive heart failure  HTN (hypertension)  Alcohol abuse    MEDICATIONS  (STANDING):  furosemide   Injectable 40 milliGRAM(s) IV Push daily  heparin   Injectable 5000 Unit(s) SubCutaneous every 8 hours  metoprolol tartrate 12.5 milliGRAM(s) Oral two times a day  potassium chloride    Tablet ER 40 milliEquivalent(s) Oral once  sacubitril 24 mG/valsartan 26 mG 1 Tablet(s) Oral two times a day  spironolactone 25 milliGRAM(s) Oral daily    MEDICATIONS  (PRN):      FAMILY HISTORY:      SOCIAL HISTORY:    [ ] Non-smoker  [ ] Smoker  [ ] Alcohol    Allergies    No Known Allergies    Intolerances    	    REVIEW OF SYSTEMS:  CONSTITUTIONAL: No fever, weight loss, or fatigue  EYES: No eye pain, visual disturbances, or discharge  ENT:  No difficulty hearing, tinnitus, vertigo; No sinus or throat pain  NECK: No pain or stiffness  RESPIRATORY: No cough, wheezing, chills or hemoptysis; + Shortness of Breath  CARDIOVASCULAR: No chest pain, palpitations, passing out, dizziness, or leg swelling  GASTROINTESTINAL: No abdominal or epigastric pain. No nausea, vomiting, or hematemesis; No diarrhea or constipation. No melena or hematochezia.  GENITOURINARY: No dysuria, frequency, hematuria, or incontinence  NEUROLOGICAL: No headaches, memory loss, loss of strength, numbness, or tremors  SKIN: No itching, burning, rashes, or lesions   LYMPH Nodes: No enlarged glands  ENDOCRINE: No heat or cold intolerance; No hair loss  MUSCULOSKELETAL: No joint pain or swelling; No muscle, back, or extremity pain  PSYCHIATRIC: No depression, anxiety, mood swings, or difficulty sleeping  HEME/LYMPH: No easy bruising, or bleeding gums  ALLERGY AND IMMUNOLOGIC: No hives or eczema	    [x ] All others negative	  [ ] Unable to obtain    PHYSICAL EXAM:  T(C): 36.6 (07-13-24 @ 04:29), Max: 36.8 (07-12-24 @ 11:20)  HR: 83 (07-13-24 @ 07:54) (56 - 84)  BP: 118/78 (07-13-24 @ 04:29) (118/78 - 146/95)  RR: 18 (07-13-24 @ 04:29) (18 - 18)  SpO2: 94% (07-13-24 @ 04:29) (94% - 98%)  Wt(kg): --  I&O's Summary    12 Jul 2024 07:01  -  13 Jul 2024 07:00  --------------------------------------------------------  IN: 0 mL / OUT: 700 mL / NET: -700 mL        Appearance: Normal	  HEENT:   Normal oral mucosa, PERRL, EOMI	  Lymphatic: No lymphadenopathy  Cardiovascular: Normal S1 S2, No JVD, + murmurs, No edema  Respiratory:rhonchi	  Psychiatry: A & O x 3, Mood & affect appropriate  Gastrointestinal:  Soft, Non-tender, + BS	  Skin: No rashes, No ecchymoses, No cyanosis	  Neurologic: Non-focal  Extremities: Normal range of motion, No clubbing, cyanosis or edema  Vascular: Peripheral pulses palpable 2+ bilaterally    TELEMETRY: 	    ECG:  	  RADIOLOGY:  OTHER: 	  	  LABS:	 	    CARDIAC MARKERS:                              16.9   4.97  )-----------( 259      ( 13 Jul 2024 07:09 )             49.4     07-13    141  |  104  |  14  ----------------------------<  76  3.5   |  22  |  1.20    Ca    9.0      13 Jul 2024 07:09  Phos  3.1     07-13  Mg     2.0     07-13    TPro  7.5  /  Alb  4.1  /  TBili  0.4  /  DBili  x   /  AST  18  /  ALT  42  /  AlkPhos  75  07-12    proBNP:   Lipid Profile:   HgA1c:   TSH:   PT/INR - ( 12 Jul 2024 03:20 )   PT: 11.7 sec;   INR: 1.07 ratio         PTT - ( 12 Jul 2024 03:20 )  PTT:28.6 sec    PREVIOUS DIAGNOSTIC TESTING:     < from: 12 Lead ECG (03.12.23 @ 19:16) >  Diagnosis Line NORMAL SINUS RHYTHM  LOW VOLTAGE QRS  T WAVE ABNORMALITY, CONSIDER INFEROLATERAL ISCHEMIA  ABNORMAL ECG  WHEN COMPARED WITH ECG OF 10-MAR-2023 11:06,  SIGNIFICANT CHANGES HAVE OCCURRED    < from: TTE Congenital Anomalies W or WO Ultrasound Enhancing Agent (07.12.24 @ 09:41) >   1. Left ventricular cavity is mildly dilated. Left ventricular wall thickness is normal. Left ventricular systolic function is severely decreased with an ejection fraction of 21 % by Castellanos's method of disks. There are no regional wall motion abnormalities seen.   2. Multiple segmental abnormalities exist. See findings.   3. Left ventricular global longitudinal strain is -11.0 % is abnormal (> -16%). Images were acquired on a [employer] ultrasound system and processed on the ultrasound machine with a heart rate of 72 bpm and a blood pressure of132/83 mmHg.   4. There is mild (grade 1) left ventricular diastolic dysfunction, with normal left ventricular filling pressure.   5. Normal right ventricular cavity size, with normal wall thickness, and normal right ventricular systolic function.   6. The left atrium is mildly dilated.   7. No significant valvular disease.   8. No pericardial effusion seen.   9. Compared to the transthoracic echocardiogram performed on 3/12/2023, there have been no significant interval changes.    < from: MR Cardiac w/wo IV Cont (03.14.23 @ 11:39) >  1. Severely enlarged left ventricular size (LVEDVi: 134 mL/m2). Severely   depressed left ventricular function. LVEF: 24%. Extensive, near   circumferential mid myocardial and subepicardial late gadolinium   enhancement throughout the basal to apical left ventricle with areas of   associated pericardial enhancement. Findings are consistent with a   nonischemic etiology, with considerations including myocarditis and   sarcoidosis inthe appropriate clinical setting.    2. Normal right ventricular size. Mildly depressed right ventricular   function. RVEF: 38%.    3. No significant valvular abnormalities.    4. Moderate left and mild right atrial enlargement.    5. Resolved pleural effusions since 3/10/2023 chest CTA.    reduced EF. The patient has been started on GDMT and is on lasix 40mg IV BID  - LHC did not show any aCAD  RHC showed elevated filling pressures with cardiac output of 4.8, cardiac index 2.1  - c/w entresto 24/26mg BID, metoprolol succinate 25mg daily, spironolactone 25mg daily  - continue with lasix 40mg IV BID and can transition to oral tomorrow  - start SGLT2i tomorrow, farxiga 10mg daily  - will obtain a cardiac MRI to rule out infiltrative process .

## 2024-07-14 LAB
ANION GAP SERPL CALC-SCNC: 12 MMOL/L — SIGNIFICANT CHANGE UP (ref 5–17)
BUN SERPL-MCNC: 14 MG/DL — SIGNIFICANT CHANGE UP (ref 7–23)
CALCIUM SERPL-MCNC: 9.2 MG/DL — SIGNIFICANT CHANGE UP (ref 8.4–10.5)
CHLORIDE SERPL-SCNC: 100 MMOL/L — SIGNIFICANT CHANGE UP (ref 96–108)
CO2 SERPL-SCNC: 25 MMOL/L — SIGNIFICANT CHANGE UP (ref 22–31)
CREAT SERPL-MCNC: 1.19 MG/DL — SIGNIFICANT CHANGE UP (ref 0.5–1.3)
EGFR: 80 ML/MIN/1.73M2 — SIGNIFICANT CHANGE UP
GLUCOSE SERPL-MCNC: 78 MG/DL — SIGNIFICANT CHANGE UP (ref 70–99)
MAGNESIUM SERPL-MCNC: 2.4 MG/DL — SIGNIFICANT CHANGE UP (ref 1.6–2.6)
POTASSIUM SERPL-MCNC: 3.1 MMOL/L — LOW (ref 3.5–5.3)
POTASSIUM SERPL-SCNC: 3.1 MMOL/L — LOW (ref 3.5–5.3)
SODIUM SERPL-SCNC: 137 MMOL/L — SIGNIFICANT CHANGE UP (ref 135–145)

## 2024-07-14 PROCEDURE — 99233 SBSQ HOSP IP/OBS HIGH 50: CPT

## 2024-07-14 RX ORDER — POTASSIUM CHLORIDE 600 MG/1
40 TABLET, FILM COATED, EXTENDED RELEASE ORAL EVERY 4 HOURS
Refills: 0 | Status: COMPLETED | OUTPATIENT
Start: 2024-07-14 | End: 2024-07-14

## 2024-07-14 RX ADMIN — POTASSIUM CHLORIDE 40 MILLIEQUIVALENT(S): 600 TABLET, FILM COATED, EXTENDED RELEASE ORAL at 09:57

## 2024-07-14 RX ADMIN — POTASSIUM CHLORIDE 40 MILLIEQUIVALENT(S): 600 TABLET, FILM COATED, EXTENDED RELEASE ORAL at 07:19

## 2024-07-14 RX ADMIN — Medication 25 MILLIGRAM(S): at 05:16

## 2024-07-14 RX ADMIN — HEPARIN SODIUM 5000 UNIT(S): 50 INJECTION, SOLUTION INTRAVENOUS at 14:28

## 2024-07-14 RX ADMIN — SACUBITRIL AND VALSARTAN 1 TABLET(S): 97; 103 TABLET, FILM COATED ORAL at 05:15

## 2024-07-14 RX ADMIN — HEPARIN SODIUM 5000 UNIT(S): 50 INJECTION, SOLUTION INTRAVENOUS at 21:42

## 2024-07-14 RX ADMIN — SACUBITRIL AND VALSARTAN 1 TABLET(S): 97; 103 TABLET, FILM COATED ORAL at 18:53

## 2024-07-14 RX ADMIN — HEPARIN SODIUM 5000 UNIT(S): 50 INJECTION, SOLUTION INTRAVENOUS at 05:15

## 2024-07-14 RX ADMIN — Medication 25 MILLIGRAM(S): at 18:53

## 2024-07-14 RX ADMIN — SPIRONOLACTONE 25 MILLIGRAM(S): 25 TABLET ORAL at 05:15

## 2024-07-14 RX ADMIN — FUROSEMIDE 40 MILLIGRAM(S): 10 INJECTION, SOLUTION INTRAMUSCULAR; INTRAVENOUS at 18:53

## 2024-07-14 RX ADMIN — FUROSEMIDE 40 MILLIGRAM(S): 10 INJECTION, SOLUTION INTRAMUSCULAR; INTRAVENOUS at 05:16

## 2024-07-15 LAB
ANION GAP SERPL CALC-SCNC: 13 MMOL/L — SIGNIFICANT CHANGE UP (ref 5–17)
BUN SERPL-MCNC: 11 MG/DL — SIGNIFICANT CHANGE UP (ref 7–23)
CALCIUM SERPL-MCNC: 9 MG/DL — SIGNIFICANT CHANGE UP (ref 8.4–10.5)
CHLORIDE SERPL-SCNC: 102 MMOL/L — SIGNIFICANT CHANGE UP (ref 96–108)
CO2 SERPL-SCNC: 22 MMOL/L — SIGNIFICANT CHANGE UP (ref 22–31)
CREAT SERPL-MCNC: 1.11 MG/DL — SIGNIFICANT CHANGE UP (ref 0.5–1.3)
EGFR: 87 ML/MIN/1.73M2 — SIGNIFICANT CHANGE UP
GLUCOSE SERPL-MCNC: 120 MG/DL — HIGH (ref 70–99)
HCT VFR BLD CALC: 53.7 % — HIGH (ref 39–50)
HGB BLD-MCNC: 18 G/DL — HIGH (ref 13–17)
MAGNESIUM SERPL-MCNC: 2.4 MG/DL — SIGNIFICANT CHANGE UP (ref 1.6–2.6)
MCHC RBC-ENTMCNC: 28.6 PG — SIGNIFICANT CHANGE UP (ref 27–34)
MCHC RBC-ENTMCNC: 33.5 GM/DL — SIGNIFICANT CHANGE UP (ref 32–36)
MCV RBC AUTO: 85.4 FL — SIGNIFICANT CHANGE UP (ref 80–100)
NRBC # BLD: 0 /100 WBCS — SIGNIFICANT CHANGE UP (ref 0–0)
PHOSPHATE SERPL-MCNC: 2.2 MG/DL — LOW (ref 2.5–4.5)
PLATELET # BLD AUTO: 316 K/UL — SIGNIFICANT CHANGE UP (ref 150–400)
POTASSIUM SERPL-MCNC: 3.8 MMOL/L — SIGNIFICANT CHANGE UP (ref 3.5–5.3)
POTASSIUM SERPL-SCNC: 3.8 MMOL/L — SIGNIFICANT CHANGE UP (ref 3.5–5.3)
RBC # BLD: 6.29 M/UL — HIGH (ref 4.2–5.8)
RBC # FLD: 13.2 % — SIGNIFICANT CHANGE UP (ref 10.3–14.5)
SODIUM SERPL-SCNC: 137 MMOL/L — SIGNIFICANT CHANGE UP (ref 135–145)
WBC # BLD: 5.95 K/UL — SIGNIFICANT CHANGE UP (ref 3.8–10.5)
WBC # FLD AUTO: 5.95 K/UL — SIGNIFICANT CHANGE UP (ref 3.8–10.5)

## 2024-07-15 PROCEDURE — 99233 SBSQ HOSP IP/OBS HIGH 50: CPT

## 2024-07-15 RX ORDER — POTASSIUM CHLORIDE 600 MG/1
40 TABLET, FILM COATED, EXTENDED RELEASE ORAL ONCE
Refills: 0 | Status: COMPLETED | OUTPATIENT
Start: 2024-07-15 | End: 2024-07-15

## 2024-07-15 RX ORDER — BUMETANIDE 0.25 MG/ML
1 INJECTION INTRAMUSCULAR; INTRAVENOUS DAILY
Refills: 0 | Status: DISCONTINUED | OUTPATIENT
Start: 2024-07-16 | End: 2024-07-16

## 2024-07-15 RX ORDER — METOPROLOL TARTRATE 50 MG
50 TABLET ORAL DAILY
Refills: 0 | Status: DISCONTINUED | OUTPATIENT
Start: 2024-07-15 | End: 2024-07-16

## 2024-07-15 RX ADMIN — POTASSIUM CHLORIDE 40 MILLIEQUIVALENT(S): 600 TABLET, FILM COATED, EXTENDED RELEASE ORAL at 09:38

## 2024-07-15 RX ADMIN — SACUBITRIL AND VALSARTAN 1 TABLET(S): 97; 103 TABLET, FILM COATED ORAL at 17:24

## 2024-07-15 RX ADMIN — Medication 25 MILLIGRAM(S): at 05:20

## 2024-07-15 RX ADMIN — FUROSEMIDE 40 MILLIGRAM(S): 10 INJECTION, SOLUTION INTRAMUSCULAR; INTRAVENOUS at 17:23

## 2024-07-15 RX ADMIN — HEPARIN SODIUM 5000 UNIT(S): 50 INJECTION, SOLUTION INTRAVENOUS at 05:20

## 2024-07-15 RX ADMIN — HEPARIN SODIUM 5000 UNIT(S): 50 INJECTION, SOLUTION INTRAVENOUS at 21:24

## 2024-07-15 RX ADMIN — FUROSEMIDE 40 MILLIGRAM(S): 10 INJECTION, SOLUTION INTRAMUSCULAR; INTRAVENOUS at 05:19

## 2024-07-15 RX ADMIN — SPIRONOLACTONE 25 MILLIGRAM(S): 25 TABLET ORAL at 05:20

## 2024-07-15 RX ADMIN — HEPARIN SODIUM 5000 UNIT(S): 50 INJECTION, SOLUTION INTRAVENOUS at 13:15

## 2024-07-15 RX ADMIN — Medication 25 MILLIGRAM(S): at 17:24

## 2024-07-15 RX ADMIN — SACUBITRIL AND VALSARTAN 1 TABLET(S): 97; 103 TABLET, FILM COATED ORAL at 05:19

## 2024-07-15 NOTE — CONSULT NOTE ADULT - ASSESSMENT
39 y.o. [place], [place] 3 years ago with history of NICM ( EF 35% in March 2023), HTN, JEFF, noncompliant with medications presenting with SOB on exertion.  He took Lasix morning of admission but is not compliant with regularly scheduled medications- Aldactone, Entresto, Metoprolol.  He was seen and evaluated by cardiologist in March 2023- Echo showed EF 35%, Cath- normal coronaries and cardiac MRI showed enlarged LV with EF 24%- nonischemic with extensive late gadolinium enhancement (See MRI report for details).  The patient admits to drinking Vodka, about 3 shots nightly and gave up smoking and Vaping since last year.  He doesn't have follow up with any PCP or cardiologist.  Patient admits to prior non adherence with medications, however he is ready to follow his medication regimen and proper follow up.      39 y.o. Male, [place] about 3 years ago with history of NICM ( EF 35% in March 2023), HTN, JEFF, noncompliant with medications presenting with SOB on exertion.  He took Lasix morning of admission but is not compliant with regularly scheduled medications- Aldactone, Entresto, Metoprolol.  He was seen and evaluated by cardiologist in March 2023- Echo showed EF 35%, Cath- normal coronaries and cardiac MRI showed enlarged LV with EF 24%- nonischemic with extensive late gadolinium enhancement (See MRI report for details).  The patient admits to drinking Vodka, about 3 shots nightly and gave up smoking and Vaping since last year.  He doesn't have follow up with any PCP or cardiologist.  Patient admits to prior non adherence with medications, however he is ready to follow his medication regimen and proper follow up.     1.  NICMP, extensive late gadolinium enhancement on prior MRI   2.  JEFF  3.  Systolic Heart Failure exacerbation     - Medication compliance discussed with patient. Patient states he will be adherent with medication regimen as he understanding this importance  - Continue on betablocker, Entresto, Aldactone  - Recommend heart failure evaluation in-patient  - Patient will need Cardiology follow up, repeat TTE out-patient  - Alcohol cessation discussed with patient   - Out patient sleep study as recommended     AKASH Salas [facility]  [phone]  39 y.o. Male, [place] about 3 years ago with history of NICM ( EF 35% in March 2023), HTN, JEFF, noncompliant with medications presenting with SOB on exertion.  He took Lasix morning of admission but is not compliant with regularly scheduled medications- Aldactone, Entresto, Metoprolol.  He was seen and evaluated by cardiologist in March 2023- Echo showed EF 35%, Cath- normal coronaries and cardiac MRI showed enlarged LV with EF 24%- nonischemic with extensive late gadolinium enhancement (See MRI report for details).  The patient admits to drinking Vodka, about 3 shots nightly and gave up smoking and Vaping since last year.  He doesn't have follow up with any PCP or cardiologist.  Patient admits to prior non adherence with medications, however he is ready to follow his medication regimen and proper follow up.     1.  NICMP, extensive late gadolinium enhancement on prior MRI   EF 35% March 2023, EF now 21% July 2024   2.  JEFF  3.  Systolic Heart Failure exacerbation     - Medication compliance discussed with patient. Patient states he will be adherent with medication regimen as he understanding this importance  - Continue on betablocker, Entresto, Aldactone  - Recommend heart failure evaluation in-patient  - Patient will need Cardiology follow up, repeat TTE out-patient  - Alcohol cessation discussed with patient   - Out patient sleep study as recommended     AKASH Salas [facility]  [phone]  39 y.o. Male, [place] [facility] about 3 years ago with history of NICM ( EF 35% in March 2023), HTN, JEFF, noncompliant with medications presenting with SOB on exertion.  He took Lasix morning of admission but is not compliant with regularly scheduled medications- Aldactone, Entresto, Metoprolol.  He was seen and evaluated by cardiologist in March 2023- Echo showed EF 35%, Cath- normal coronaries and cardiac MRI showed enlarged LV with EF 24%- nonischemic with extensive late gadolinium enhancement (See MRI report for details).  The patient admits to drinking Vodka, about 3 shots nightly and gave up smoking and Vaping since last year.  He doesn't have follow up with any PCP or cardiologist.  Patient admits to prior non adherence with medications, however he is ready to follow his medication regimen and proper follow up.     1.  NICMP, extensive late gadolinium enhancement on prior MRI   EF 35% March 2023, EF now 21% July 2024   2.  JEFF  3.  Systolic Heart Failure exacerbation     - Patient will need compliance with GDMT which he had not previously been prior to offering a primary prevention ICD implant.  Medication compliance discussed with patient. Patient states he will be adherent with medication regimen as he now understanding this importance  - Continue on betablocker, Entresto, Aldactone  - Recommend heart failure evaluation in-patient  - Patient will need Cardiology follow up, repeat TTE out-patient  - Alcohol cessation discussed with patient   - Out patient sleep study as recommended     AKASH Salas [facility]  [phone]

## 2024-07-15 NOTE — CONSULT NOTE ADULT - NS ATTEND AMEND GEN_ALL_CORE FT
Known EtOH abuse and medication non-compliance. Outpt follow up for reassessment and consideration for ICD implant.

## 2024-07-16 ENCOUNTER — TRANSCRIPTION ENCOUNTER (OUTPATIENT)
Age: 39
End: 2024-07-16

## 2024-07-16 VITALS
DIASTOLIC BLOOD PRESSURE: 73 MMHG | SYSTOLIC BLOOD PRESSURE: 108 MMHG | TEMPERATURE: 98 F | RESPIRATION RATE: 18 BRPM | HEART RATE: 85 BPM | OXYGEN SATURATION: 98 %

## 2024-07-16 PROBLEM — F10.10 ALCOHOL ABUSE, UNCOMPLICATED: Chronic | Status: ACTIVE | Noted: 2024-07-12

## 2024-07-16 PROBLEM — I10 ESSENTIAL (PRIMARY) HYPERTENSION: Chronic | Status: ACTIVE | Noted: 2024-07-12

## 2024-07-16 PROBLEM — I42.8 OTHER CARDIOMYOPATHIES: Chronic | Status: ACTIVE | Noted: 2024-07-12

## 2024-07-16 PROBLEM — I50.22 CHRONIC SYSTOLIC (CONGESTIVE) HEART FAILURE: Chronic | Status: ACTIVE | Noted: 2024-07-12

## 2024-07-16 LAB
ANION GAP SERPL CALC-SCNC: 12 MMOL/L — SIGNIFICANT CHANGE UP (ref 5–17)
BUN SERPL-MCNC: 16 MG/DL — SIGNIFICANT CHANGE UP (ref 7–23)
CALCIUM SERPL-MCNC: 8.9 MG/DL — SIGNIFICANT CHANGE UP (ref 8.4–10.5)
CHLORIDE SERPL-SCNC: 103 MMOL/L — SIGNIFICANT CHANGE UP (ref 96–108)
CO2 SERPL-SCNC: 21 MMOL/L — LOW (ref 22–31)
CREAT SERPL-MCNC: 1.1 MG/DL — SIGNIFICANT CHANGE UP (ref 0.5–1.3)
EGFR: 88 ML/MIN/1.73M2 — SIGNIFICANT CHANGE UP
GLUCOSE SERPL-MCNC: 96 MG/DL — SIGNIFICANT CHANGE UP (ref 70–99)
MAGNESIUM SERPL-MCNC: 2.6 MG/DL — SIGNIFICANT CHANGE UP (ref 1.6–2.6)
PHOSPHATE SERPL-MCNC: 3.2 MG/DL — SIGNIFICANT CHANGE UP (ref 2.5–4.5)
POTASSIUM SERPL-MCNC: 3.8 MMOL/L — SIGNIFICANT CHANGE UP (ref 3.5–5.3)
POTASSIUM SERPL-SCNC: 3.8 MMOL/L — SIGNIFICANT CHANGE UP (ref 3.5–5.3)
SODIUM SERPL-SCNC: 136 MMOL/L — SIGNIFICANT CHANGE UP (ref 135–145)

## 2024-07-16 PROCEDURE — 99239 HOSP IP/OBS DSCHRG MGMT >30: CPT

## 2024-07-16 PROCEDURE — 99223 1ST HOSP IP/OBS HIGH 75: CPT | Mod: GC

## 2024-07-16 RX ORDER — SACUBITRIL AND VALSARTAN 97; 103 MG/1; MG/1
1 TABLET, FILM COATED ORAL
Qty: 120 | Refills: 0
Start: 2024-07-16 | End: 2024-09-13

## 2024-07-16 RX ORDER — SACUBITRIL AND VALSARTAN 97; 103 MG/1; MG/1
1 TABLET, FILM COATED ORAL
Refills: 0 | DISCHARGE

## 2024-07-16 RX ORDER — METOPROLOL TARTRATE 50 MG
1 TABLET ORAL
Qty: 60 | Refills: 0
Start: 2024-07-16 | End: 2024-09-13

## 2024-07-16 RX ORDER — DAPAGLIFLOZIN 10 MG/1
1 TABLET, FILM COATED ORAL
Qty: 30 | Refills: 0
Start: 2024-07-16 | End: 2024-08-14

## 2024-07-16 RX ORDER — BUMETANIDE 0.25 MG/ML
1 INJECTION INTRAMUSCULAR; INTRAVENOUS
Qty: 60 | Refills: 0
Start: 2024-07-16 | End: 2024-09-13

## 2024-07-16 RX ORDER — EMPAGLIFLOZIN 25 MG/1
1 TABLET, FILM COATED ORAL
Qty: 60 | Refills: 0
Start: 2024-07-16 | End: 2024-09-13

## 2024-07-16 RX ORDER — SPIRONOLACTONE 25 MG/1
1 TABLET ORAL
Qty: 60 | Refills: 0
Start: 2024-07-16 | End: 2024-09-13

## 2024-07-16 RX ORDER — DAPAGLIFLOZIN 10 MG/1
10 TABLET, FILM COATED ORAL DAILY
Refills: 0 | Status: DISCONTINUED | OUTPATIENT
Start: 2024-07-16 | End: 2024-07-16

## 2024-07-16 RX ADMIN — SPIRONOLACTONE 25 MILLIGRAM(S): 25 TABLET ORAL at 05:09

## 2024-07-16 RX ADMIN — Medication 50 MILLIGRAM(S): at 05:09

## 2024-07-16 RX ADMIN — HEPARIN SODIUM 5000 UNIT(S): 50 INJECTION, SOLUTION INTRAVENOUS at 05:10

## 2024-07-16 RX ADMIN — SACUBITRIL AND VALSARTAN 1 TABLET(S): 97; 103 TABLET, FILM COATED ORAL at 18:01

## 2024-07-16 RX ADMIN — BUMETANIDE 1 MILLIGRAM(S): 0.25 INJECTION INTRAMUSCULAR; INTRAVENOUS at 05:09

## 2024-07-16 RX ADMIN — HEPARIN SODIUM 5000 UNIT(S): 50 INJECTION, SOLUTION INTRAVENOUS at 14:36

## 2024-07-16 NOTE — PROGRESS NOTE ADULT - PROBLEM SELECTOR PLAN 4
- bp stable  - c/w bumex, metoprolol Er 25mg, Aldactone 25mg, Entresto 24-26mg/d
BP has been poorly controlled, Last /100  - Resume metoprolol Er 25mg, Aldactone 25mg, Entresto 24-26mg/d
BP has been poorly controlled, Last /100  - Resume metoprolol Er 25mg, Aldactone 25mg, Entresto 24-26mg/d  - Lasix 40mg IV q 12hrs
BP has been poorly controlled, Last /100  - Resume metoprolol Er 25mg, Aldactone 25mg, Entresto 24-26mg/d  - Lasix 40mg IV q 12hrs

## 2024-07-16 NOTE — CONSULT NOTE ADULT - SUBJECTIVE AND OBJECTIVE BOX
Patient seen and evaluated at bedside    Chief Complaint: SOB    HPI:  39 M with h/o NICM/HFrEF (EF 24%), HTN, JEFF, presents with OLIVEIRA.   Patient was initially hospitalized 3/2023 with same complaint when he was diagnosed with his CM. He underwent an extensive work-up including LHC with mild luminal irregularities and cMRI that was c/w NICM. He was discharged with GDMT and cardiology follow-up, however, he reportedly has had issues with medication adherence and follow-up.   Patient was born in [facility] and has lived in  since age 25. He has a history of frequent EtOH use reportedly drank up to 6 shots of vodka daily) and has vaped but denies recreational drug use.   He has no family history of CM.     This admission he was diuresed with IV diuretics and restarted on his GDMT.           PMHx:   No pertinent past medical history    NICM (nonischemic cardiomyopathy)    Chronic systolic congestive heart failure    HTN (hypertension)    Alcohol abuse        PSHx:   No significant past surgical history        Allergies:  No Known Allergies      Home Meds:    Current Medications:   buMETAnide 1 milliGRAM(s) Oral daily  heparin   Injectable 5000 Unit(s) SubCutaneous every 8 hours  metoprolol succinate ER 50 milliGRAM(s) Oral daily  sacubitril 24 mG/valsartan 26 mG 1 Tablet(s) Oral two times a day  spironolactone 25 milliGRAM(s) Oral daily      FAMILY HISTORY:      Social History:  Smoking History:  Alcohol Use:  Drug Use:    REVIEW OF SYSTEMS:  CONSTITUTIONAL: No weakness, fevers or chills  EYES/ENT: No visual changes;  No dysphagia  NECK: No pain or stiffness  RESPIRATORY: No cough, wheezing, hemoptysis; No shortness of breath  CARDIOVASCULAR: No chest pain or palpitations; No lower extremity edema  GASTROINTESTINAL: No abdominal or epigastric pain. No nausea, vomiting, or hematemesis; No diarrhea or constipation. No melena or hematochezia.  BACK: No back pain  GENITOURINARY: No dysuria, frequency or hematuria  NEUROLOGICAL: No numbness or weakness  SKIN: No itching, burning, rashes, or lesions   All other review of systems is negative unless indicated above.    Physical Exam:  T(F): 97.5 (07-16), Max: 98.6 (07-15)  HR: 75 (07-16) (75 - 80)  BP: 112/75 (07-16) (94/62 - 112/75)  RR: 18 (07-16)  SpO2: 100% (07-16)  Appearance: No acute distress; well appearing  Eyes:  EOMI  HEENT: Normal oral mucosa  Cardiovascular: RRR, S1, S2, no murmurs, rubs, or gallops; no edema; no JVD  Respiratory: Clear to auscultation bilaterally  Gastrointestinal: soft, non-tender, non-distended  Extremities: no lower extremity edema   Neurologic: Non-focal  Psychiatry: AAOx3, mood & affect appropriate    Cardiovascular Diagnostic Testing:    ECG:     Echo:     Stress Testing:    Cath:    Imaging:    CXR:     Labs: Personally reviewed                        18.0   5.95  )-----------( 316      ( 15 Jul 2024 06:56 )             53.7     07-16    136  |  103  |  16  ----------------------------<  96  3.8   |  21<L>  |  1.10    Ca    8.9      16 Jul 2024 06:20  Phos  3.2     07-16  Mg     2.6     07-16          CARDIAC MARKERS ( 12 Jul 2024 04:44 )  17 ng/L / x     / x     / x     / x     / x      CARDIAC MARKERS ( 12 Jul 2024 03:20 )  17 ng/L / x     / x     / x     / x     / x                  Thyroid Stimulating Hormone, Serum: 0.97 uIU/mL (07-13 @ 07:09)       Patient seen and evaluated at bedside    Chief Complaint: SOB    HPI:  39 M with h/o NICM/HFrEF (EF 24%), HTN, JEFF, presents with OLIVEIRA.   Patient was initially hospitalized 3/2023 with same complaint when he was diagnosed with his CM. He underwent an extensive work-up including LHC with mild luminal irregularities and cMRI that was c/w NICM. He was discharged with GDMT and cardiology follow-up, however, he has had issues with medication adherence and follow-up.   Patient was born in [facility] and has lived in  since age 25. He has a history of frequent EtOH use consuming 3-6 shots of vodka daily and has vaped but denies recreational drug use.   He has no family history of CM.     This admission he was diuresed with IV diuretics and restarted on his GDMT.           PMHx:   No pertinent past medical history  NICM (nonischemic cardiomyopathy)  Chronic systolic congestive heart failure  HTN (hypertension)  Alcohol abuse        PSHx:   No significant past surgical history        Allergies:  No Known Allergies      Home Meds:    Current Medications:   buMETAnide 1 milliGRAM(s) Oral daily  heparin   Injectable 5000 Unit(s) SubCutaneous every 8 hours  metoprolol succinate ER 50 milliGRAM(s) Oral daily  sacubitril 24 mG/valsartan 26 mG 1 Tablet(s) Oral two times a day  spironolactone 25 milliGRAM(s) Oral daily      REVIEW OF SYSTEMS:  All other review of systems is negative unless indicated above.    Physical Exam:  T(F): 97.5 (07-16), Max: 98.6 (07-15)  HR: 75 (07-16) (75 - 80)  BP: 112/75 (07-16) (94/62 - 112/75)  RR: 18 (07-16)  SpO2: 100% (07-16)  Appearance: No acute distress; well appearing  Eyes:  EOMI  HEENT: Normal oral mucosa  Cardiovascular: RRR, S1, S2, no murmurs, rubs, or gallops; no edema; no JVD  Respiratory: Clear to auscultation bilaterally  Gastrointestinal: soft, non-tender, non-distended  Extremities: no lower extremity edema   Neurologic: Non-focal  Psychiatry: AAOx3, mood & affect appropriate    Cardiovascular Diagnostic Testing:      Labs: Personally reviewed                        18.0   5.95  )-----------( 316      ( 15 Jul 2024 06:56 )             53.7     07-16    136  |  103  |  16  ----------------------------<  96  3.8   |  21<L>  |  1.10    Ca    8.9      16 Jul 2024 06:20  Phos  3.2     07-16  Mg     2.6     07-16          CARDIAC MARKERS ( 12 Jul 2024 04:44 )  17 ng/L / x     / x     / x     / x     / x      CARDIAC MARKERS ( 12 Jul 2024 03:20 )  17 ng/L / x     / x     / x     / x     / x                  Thyroid Stimulating Hormone, Serum: 0.97 uIU/mL (07-13 @ 07:09)

## 2024-07-16 NOTE — DISCHARGE NOTE PROVIDER - PROVIDER TOKENS
PROVIDER:[TOKEN:[[id number]]] PROVIDER:[TOKEN:[[id number]]],PROVIDER:[TOKEN:[[id number]],FOLLOWUP:[1 week]]

## 2024-07-16 NOTE — CONSULT NOTE ADULT - ATTENDING COMMENTS
40 YO M with a history of ACC/AHA Stage C NICM (LV 6.1 cm, LVEF 20-25%) and EtOH use presenting with decompensated heart failure in setting of being off GDMT.  Of note he was diagnosed with a NICM 3/2023 when he presented with dyspnea and found to have LV dysfunction. His LHC was unremarkable and he had notable LGE. He was initiated on GDMT but ?did not make followup appointment and has had medication noncompliance as he was unaware of the utility of his medications.    I had a long discussion with him about the importance of GDMT and followup as well as EtOH abstinence and he states he understands. Will re-engage in the HF clinic.      REVIEW OF STUDIES  TTE 7/2024: LV 6.1 cm, LVEF 20-25% with global hypokinesis, LVOT VTI 10 cm, normal RV size/function, no significant valvular disease, IVC collapsing  LHC 3/2023: luminal irregularities  RHC 3/2023: RA 9, PA 52/33, PCWP 39, Sudha CO/CI 4.2/2.0  CMRI 3/2023: LVEF 24%, extensive circumferential midmyocardial and subepicardial LGE which can be seen in myocarditis or sarcoidosis, RVEF 48%    PLAN  # Acute on chronic systolic heart failure  - Etiology: non-ischemic. Does have abnormal LGE. Will benefit from PET/CT and genetic testing as outpatient. Also possible contribution of EtOH use  - GDMT: current regimen is entresto 24/26 mg BID, metoprolol succinate 50 mg daily, spironolactone 25 mg daily. Start Jardiance or Farxiga 10 mg daily (whichever is covered)  - Diuretics: current regimen is bumex 1 mg daily, will continue   - Device: though LGE and low EF increase SCD risk, he needs reassessment of LVEF on max GDMT and after abstaining from EtOH     # EtOH use  - Discussed importance of complete cessation     No contraindication to d/c from HF perspective. He is leaving for [place] on 7/24, will arrange for HF followup before this trip. He will follow with me in the office upon his return.

## 2024-07-16 NOTE — DISCHARGE NOTE PROVIDER - NSDCCPCAREPLAN_GEN_ALL_CORE_FT
PRINCIPAL DISCHARGE DIAGNOSIS  Diagnosis: Acute decompensated heart failure  Assessment and Plan of Treatment: Weigh yourself daily.  If you gain 3lbs in 3 days, or 5lbs in a week call your Health Care Provider.  Do not eat or drink foods containing more than 2000mg of salt (sodium) in your diet every day.  Call your Health Care Provider if you have any swelling or increased swelling in your feet, ankles, and/or stomach.  Take all of your medication as directed.  If you become dizzy call your Health Care Provider.        SECONDARY DISCHARGE DIAGNOSES  Diagnosis: HTN (hypertension)  Assessment and Plan of Treatment: Low salt diet  Activity as tolerated.  Take all medication as prescribed.  Follow up with your medical doctor for routine blood pressure monitoring at your next visit.  Notify your doctor if you have any of the following symptoms:   Dizziness, Lightheadedness, Blurry vision, Headache, Chest pain, Shortness of breath      Diagnosis: JEFF (obstructive sleep apnea)  Assessment and Plan of Treatment: Please follow up with your PMD for outpatient sleep study.    Diagnosis: RJ (acute kidney injury)  Assessment and Plan of Treatment: Stable.

## 2024-07-16 NOTE — DISCHARGE NOTE NURSING/CASE MANAGEMENT/SOCIAL WORK - NSDCFUADDAPPT_GEN_ALL_CORE_FT
Follow up at Heart Failure clinic on 7/23/24 at 3pm at [address] [place] [address].   Follow up with your PMD and Cardiologist as an outpatient.   Follow up with Dr. Ghosh for genetic testing.

## 2024-07-16 NOTE — PROGRESS NOTE ADULT - SUBJECTIVE AND OBJECTIVE BOX
Date of Service: 07-16-24 @ 07:06           CARDIOLOGY     PROGRESS  NOTE   ________________________________________________    CHIEF COMPLAINT:Patient is a 39y old  Male who presents with a chief complaint of Shortness of breath (15 Jul 2024 20:22)  no complain  	  REVIEW OF SYSTEMS:  CONSTITUTIONAL: No fever, weight loss, or fatigue  EYES: No eye pain, visual disturbances, or discharge  ENT:  No difficulty hearing, tinnitus, vertigo; No sinus or throat pain  NECK: No pain or stiffness  RESPIRATORY: No cough, wheezing, chills or hemoptysis; No Shortness of Breath  CARDIOVASCULAR: No chest pain, palpitations, passing out, dizziness, or leg swelling  GASTROINTESTINAL: No abdominal or epigastric pain. No nausea, vomiting, or hematemesis; No diarrhea or constipation. No melena or hematochezia.  GENITOURINARY: No dysuria, frequency, hematuria, or incontinence  NEUROLOGICAL: No headaches, memory loss, loss of strength, numbness, or tremors  SKIN: No itching, burning, rashes, or lesions   LYMPH Nodes: No enlarged glands  ENDOCRINE: No heat or cold intolerance; No hair loss  MUSCULOSKELETAL: No joint pain or swelling; No muscle, back, or extremity pain  PSYCHIATRIC: No depression, anxiety, mood swings, or difficulty sleeping  HEME/LYMPH: No easy bruising, or bleeding gums  ALLERGY AND IMMUNOLOGIC: No hives or eczema	    [ x] All others negative	  [ ] Unable to obtain    PHYSICAL EXAM:  T(C): 36.5 (07-16-24 @ 04:47), Max: 37 (07-15-24 @ 20:14)  HR: 76 (07-16-24 @ 04:47) (76 - 80)  BP: 97/60 (07-16-24 @ 05:14) (92/62 - 107/73)  RR: 18 (07-16-24 @ 04:47) (18 - 18)  SpO2: 95% (07-16-24 @ 04:47) (95% - 97%)  Wt(kg): --  I&O's Summary    15 Jul 2024 07:01  -  16 Jul 2024 07:00  --------------------------------------------------------  IN: 720 mL / OUT: 400 mL / NET: 320 mL        Appearance: Normal	  HEENT:   Normal oral mucosa, PERRL, EOMI	  Lymphatic: No lymphadenopathy  Cardiovascular: Normal S1 S2, No JVD, + murmurs, No edema  Respiratory: rhonchi  Psychiatry: A & O x 3, Mood & affect appropriate  Gastrointestinal:  Soft, Non-tender, + BS	  Skin: No rashes, No ecchymoses, No cyanosis	  Neurologic: Non-focal  Extremities: Normal range of motion, No clubbing, cyanosis or edema  Vascular: Peripheral pulses palpable 2+ bilaterally    MEDICATIONS  (STANDING):  buMETAnide 1 milliGRAM(s) Oral daily  heparin   Injectable 5000 Unit(s) SubCutaneous every 8 hours  metoprolol succinate ER 50 milliGRAM(s) Oral daily  sacubitril 24 mG/valsartan 26 mG 1 Tablet(s) Oral two times a day  spironolactone 25 milliGRAM(s) Oral daily      TELEMETRY: 	    ECG:  	  RADIOLOGY:  OTHER: 	  	  LABS:	 	    CARDIAC MARKERS:                                18.0   5.95  )-----------( 316      ( 15 Jul 2024 06:56 )             53.7     07-15    137  |  102  |  11  ----------------------------<  120<H>  3.8   |  22  |  1.11    Ca    9.0      15 Jul 2024 06:58  Phos  2.2     07-15  Mg     2.4     07-15      proBNP:   Lipid Profile: Cholesterol 229  LDL --  HDL 48  TG 69    HgA1c:   TSH: Thyroid Stimulating Hormone, Serum: 0.97 uIU/mL (07-13 @ 07:09)          Assessment and plan  ---------------------------  The patient is a 39 M with h/o NICM ( EF 35% in March 2023), HTN, JEFF, noncompliant on medications presenting with SOB on exertion and movements lately, some chest discomfort with no radiation, sweats, N/V for about a week. He took Lasix this morning but is not compliant with regularly scheduled medications- Aldactone, Entresto, Metoprolol. Denies recent travel, fever but some dry cough when he is on AC fr hot weather. He gained about 20Lbs in last several months.  He was seen and evaluated by cardiologist in March 2023- Echo showed EF 35%, Cath- normal coronaries and cardiac MRI showed enlarged LV with EF 24%- nonischemic. The patient has been drinking Vodka shots each week and gave up smoking and Vaping since last year per statement  pt with NICM, full salazar was done before , non compliant to meds  continue all cardiac meds  CHF team eval for possibility of heart transplant LATER ON if needed  continue all cardiac meds, add Farxiga upon dc  pt will benefit from AICD/ needs to be compliant with meds and fu  pt  needs to fu in card and chf , EP clinic  risk of SCD explained to the pt  dc iv lasix  bumex one mg daily  change metoprolol tartrate to Succinate  chf team consult to fu pt as out pt  stop drinking ETOH, needs close follow up      	        
Date of Service: 07-14-24 @ 08:36           CARDIOLOGY     PROGRESS  NOTE   ________________________________________________    CHIEF COMPLAINT:Patient is a 39y old  Male who presents with a chief complaint of Shortness of breath (13 Jul 2024 11:12)  doing better, continue diuresis  	  REVIEW OF SYSTEMS:  CONSTITUTIONAL: No fever, weight loss, or fatigue  EYES: No eye pain, visual disturbances, or discharge  ENT:  No difficulty hearing, tinnitus, vertigo; No sinus or throat pain  NECK: No pain or stiffness  RESPIRATORY: No cough, wheezing, chills or hemoptysis; decrease  Shortness of Breath  CARDIOVASCULAR: No chest pain, palpitations, passing out, dizziness, or leg swelling  GASTROINTESTINAL: No abdominal or epigastric pain. No nausea, vomiting, or hematemesis; No diarrhea or constipation. No melena or hematochezia.  GENITOURINARY: No dysuria, frequency, hematuria, or incontinence  NEUROLOGICAL: No headaches, memory loss, loss of strength, numbness, or tremors  SKIN: No itching, burning, rashes, or lesions   LYMPH Nodes: No enlarged glands  ENDOCRINE: No heat or cold intolerance; No hair loss  MUSCULOSKELETAL: No joint pain or swelling; No muscle, back, or extremity pain  PSYCHIATRIC: No depression, anxiety, mood swings, or difficulty sleeping  HEME/LYMPH: No easy bruising, or bleeding gums  ALLERGY AND IMMUNOLOGIC: No hives or eczema	    [x ] All others negative	  [ ] Unable to obtain    PHYSICAL EXAM:  T(C): 36.4 (07-14-24 @ 05:12), Max: 36.7 (07-13-24 @ 17:25)  HR: 82 (07-14-24 @ 07:58) (80 - 99)  BP: 118/79 (07-14-24 @ 05:12) (105/75 - 119/85)  RR: 18 (07-14-24 @ 05:12) (18 - 18)  SpO2: 95% (07-14-24 @ 05:12) (95% - 97%)  Wt(kg): --  I&O's Summary    13 Jul 2024 07:01  -  14 Jul 2024 07:00  --------------------------------------------------------  IN: 720 mL / OUT: 2250 mL / NET: -1530 mL        Appearance: Normal	  HEENT:   Normal oral mucosa, PERRL, EOMI	  Lymphatic: No lymphadenopathy  Cardiovascular: Normal S1 S2, No JVD, + murmurs, No edema  Respiratory: rhonchi  Gastrointestinal:  Soft, Non-tender, + BS	  Skin: No rashes, No ecchymoses, No cyanosis	  Neurologic: Non-focal  Extremities: Normal range of motion, No clubbing, cyanosis or edema  Vascular: Peripheral pulses palpable 2+ bilaterally    MEDICATIONS  (STANDING):  furosemide   Injectable 40 milliGRAM(s) IV Push every 12 hours  heparin   Injectable 5000 Unit(s) SubCutaneous every 8 hours  metoprolol tartrate 25 milliGRAM(s) Oral every 12 hours  potassium chloride    Tablet ER 40 milliEquivalent(s) Oral once  potassium chloride    Tablet ER 40 milliEquivalent(s) Oral every 4 hours  sacubitril 24 mG/valsartan 26 mG 1 Tablet(s) Oral two times a day  spironolactone 25 milliGRAM(s) Oral daily      TELEMETRY: 	    ECG:  	  RADIOLOGY:  OTHER: 	  	  LABS:	 	    CARDIAC MARKERS:                          16.9   4.97  )-----------( 259      ( 13 Jul 2024 07:09 )             49.4     07-14    137  |  100  |  14  ----------------------------<  78  3.1<L>   |  25  |  1.19    Ca    9.2      14 Jul 2024 06:30  Phos  3.1     07-13  Mg     2.4     07-14      proBNP:   Lipid Profile: Cholesterol 229  LDL --  HDL 48  TG 69    HgA1c:   TSH: Thyroid Stimulating Hormone, Serum: 0.97 uIU/mL (07-13 @ 07:09)          Assessment and plan  ---------------------------  The patient is a 39 M with h/o NICM ( EF 35% in March 2023), HTN, JEFF, noncompliant on medications presenting with SOB on exertion and movements lately, some chest discomfort with no radiation, sweats, N/V for about a week. He took Lasix this morning but is not compliant with regularly scheduled medications- Aldactone, Entresto, Metoprolol. Denies recent travel, fever but some dry cough when he is on AC fr hot weather. He gained about 20Lbs in last several months.  He was seen and evaluated by cardiologist in March 2023- Echo showed EF 35%, Cath- normal coronaries and cardiac MRI showed enlarged LV with EF 24%- nonischemic. The patient has been drinking Vodka shots each week and gave up smoking and Vaping since last year per statement  pt with NICM, full salazar was done before , non compliant to meds  continue all cardiac meds  CHF team eval for possibility of heart transplant LATER ON if needed  continue all cardiac meds, add Farxiga upon dc  pt will benefit from AICD  change metoprolol tartrate to Succinate  chf team consult to fu pt as out pt      	        
Date of Service: 07-15-24 @ 20:22           CARDIOLOGY     PROGRESS  NOTE   ________________________________________________    CHIEF COMPLAINT:Patient is a 39y old  Male who presents with a chief complaint of Shortness of breath (15 Jul 2024 14:55)  no complain  	  REVIEW OF SYSTEMS:  CONSTITUTIONAL: No fever, weight loss, or fatigue  EYES: No eye pain, visual disturbances, or discharge  ENT:  No difficulty hearing, tinnitus, vertigo; No sinus or throat pain  NECK: No pain or stiffness  RESPIRATORY: No cough, wheezing, chills or hemoptysis; No Shortness of Breath  CARDIOVASCULAR: No chest pain, palpitations, passing out, dizziness, or leg swelling  GASTROINTESTINAL: No abdominal or epigastric pain. No nausea, vomiting, or hematemesis; No diarrhea or constipation. No melena or hematochezia.  GENITOURINARY: No dysuria, frequency, hematuria, or incontinence  NEUROLOGICAL: No headaches, memory loss, loss of strength, numbness, or tremors  SKIN: No itching, burning, rashes, or lesions   LYMPH Nodes: No enlarged glands  ENDOCRINE: No heat or cold intolerance; No hair loss  MUSCULOSKELETAL: No joint pain or swelling; No muscle, back, or extremity pain  PSYCHIATRIC: No depression, anxiety, mood swings, or difficulty sleeping  HEME/LYMPH: No easy bruising, or bleeding gums  ALLERGY AND IMMUNOLOGIC: No hives or eczema	    [ x] All others negative	  [ ] Unable to obtain    PHYSICAL EXAM:  T(C): 37 (07-15-24 @ 20:14), Max: 37 (07-15-24 @ 20:14)  HR: 79 (07-15-24 @ 20:14) (72 - 81)  BP: 94/62 (07-15-24 @ 20:14) (92/62 - 111/79)  RR: 18 (07-15-24 @ 20:14) (18 - 18)  SpO2: 95% (07-15-24 @ 20:14) (95% - 99%)  Wt(kg): --  I&O's Summary    14 Jul 2024 07:01  -  15 Jul 2024 07:00  --------------------------------------------------------  IN: 480 mL / OUT: 2300 mL / NET: -1820 mL    15 Jul 2024 07:01  -  15 Jul 2024 20:22  --------------------------------------------------------  IN: 720 mL / OUT: 400 mL / NET: 320 mL        Appearance: Normal	  HEENT:   Normal oral mucosa, PERRL, EOMI	  Lymphatic: No lymphadenopathy  Cardiovascular: Normal S1 S2, No JVD, + murmurs, No edema  Respiratory:rhonchi  Psychiatry: A & O x 3, Mood & affect appropriate  Gastrointestinal:  Soft, Non-tender, + BS	  Skin: No rashes, No ecchymoses, No cyanosis	  Neurologic: Non-focal  Extremities: Normal range of motion, No clubbing, cyanosis or edema  Vascular: Peripheral pulses palpable 2+ bilaterally    MEDICATIONS  (STANDING):  furosemide   Injectable 40 milliGRAM(s) IV Push every 12 hours  heparin   Injectable 5000 Unit(s) SubCutaneous every 8 hours  metoprolol tartrate 25 milliGRAM(s) Oral every 12 hours  sacubitril 24 mG/valsartan 26 mG 1 Tablet(s) Oral two times a day  spironolactone 25 milliGRAM(s) Oral daily      TELEMETRY: 	    ECG:  	  RADIOLOGY:  OTHER: 	  	  LABS:	 	    CARDIAC MARKERS:                                18.0   5.95  )-----------( 316      ( 15 Jul 2024 06:56 )             53.7     07-15    137  |  102  |  11  ----------------------------<  120<H>  3.8   |  22  |  1.11    Ca    9.0      15 Jul 2024 06:58  Phos  2.2     07-15  Mg     2.4     07-15      proBNP:   Lipid Profile: Cholesterol 229  LDL --  HDL 48  TG 69    HgA1c:   TSH: Thyroid Stimulating Hormone, Serum: 0.97 uIU/mL (07-13 @ 07:09)    1.  NICMP, extensive late gadolinium enhancement on prior MRI   EF 35% March 2023, EF now 21% July 2024   2.  JEFF  3.  Systolic Heart Failure exacerbation     - Patient will need compliance with GDMT which he had not previously been prior to offering a primary prevention ICD implant.  Medication compliance discussed with patient. Patient states he will be adherent with medication regimen as he now understanding this importance  - Continue on betablocker, Entresto, Aldactone  - Recommend heart failure evaluation in-patient  - Patient will need Cardiology follow up, repeat TTE out-patient  - Alcohol cessation discussed with patient   - Out patient sleep study as recommended     < from: Xray Chest 1 View- PORTABLE-Urgent (07.12.24 @ 03:31) >  Findings suggestive of mild interstitial pulmonary edema.        Assessment and plan  ---------------------------  The patient is a 39 M with h/o NICM ( EF 35% in March 2023), HTN, JEFF, noncompliant on medications presenting with SOB on exertion and movements lately, some chest discomfort with no radiation, sweats, N/V for about a week. He took Lasix this morning but is not compliant with regularly scheduled medications- Aldactone, Entresto, Metoprolol. Denies recent travel, fever but some dry cough when he is on AC fr hot weather. He gained about 20Lbs in last several months.  He was seen and evaluated by cardiologist in March 2023- Echo showed EF 35%, Cath- normal coronaries and cardiac MRI showed enlarged LV with EF 24%- nonischemic. The patient has been drinking Vodka shots each week and gave up smoking and Vaping since last year per statement  pt with NICM, full salazar was done before , non compliant to meds  continue all cardiac meds  CHF team eval for possibility of heart transplant LATER ON if needed  continue all cardiac meds, add Farxiga upon dc  pt will benefit from AICD/ needs to be compliant with meds and fu  pt nneeds to fu in card and chf , EP clinic  risk of SCXD explained to the pt  dc iv lasix  bumex one mg daily  change metoprolol tartrate to Succinate  chf team consult to fu pt as out pt      	        
[facility] Division of Hospital Medicine  Norberto Tang MD  Available via MS Teams    SUBJECTIVE / OVERNIGHT EVENTS: No acute events overnight. Pt seen and examined at bedside. Denies any acute complaints including sob.     ADDITIONAL REVIEW OF SYSTEMS:    MEDICATIONS  (STANDING):  furosemide   Injectable 40 milliGRAM(s) IV Push every 12 hours  heparin   Injectable 5000 Unit(s) SubCutaneous every 8 hours  metoprolol tartrate 25 milliGRAM(s) Oral every 12 hours  sacubitril 24 mG/valsartan 26 mG 1 Tablet(s) Oral two times a day  spironolactone 25 milliGRAM(s) Oral daily    MEDICATIONS  (PRN):      I&O's Summary    14 Jul 2024 07:01  -  15 Jul 2024 07:00  --------------------------------------------------------  IN: 480 mL / OUT: 2300 mL / NET: -1820 mL        PHYSICAL EXAM:  Vital Signs Last 24 Hrs  T(C): 36.7 (15 Jul 2024 11:09), Max: 36.9 (14 Jul 2024 21:40)  T(F): 98 (15 Jul 2024 11:09), Max: 98.4 (14 Jul 2024 21:40)  HR: 76 (15 Jul 2024 12:01) (72 - 88)  BP: 92/62 (15 Jul 2024 12:01) (92/62 - 111/79)  BP(mean): --  RR: 18 (15 Jul 2024 11:09) (18 - 18)  SpO2: 97% (15 Jul 2024 11:09) (95% - 99%)    Parameters below as of 15 Jul 2024 11:09  Patient On (Oxygen Delivery Method): room air    CONSTITUTIONAL: NAD, obese  RESPIRATORY: Normal respiratory effort; lungs are clear to auscultation bilaterally  CARDIOVASCULAR: Regular rate and rhythm, normal S1 and S2   ABDOMEN: Nontender to palpation, normoactive bowel sounds, no rebound/guarding; No hepatosplenomegaly  MUSCULOSKELETAL: no clubbing or cyanosis of digits; no joint swelling or tenderness to palpation  PSYCH: A+O to person, place, and time; affect appropriate  NEUROLOGY: CN 2-12 are intact and symmetric; no gross sensory deficits   SKIN: No rashes; no palpable lesions    LABS:                        18.0   5.95  )-----------( 316      ( 15 Jul 2024 06:56 )             53.7     07-15    137  |  102  |  11  ----------------------------<  120<H>  3.8   |  22  |  1.11    Ca    9.0      15 Jul 2024 06:58  Phos  2.2     07-15  Mg     2.4     07-15            Urinalysis Basic - ( 15 Jul 2024 06:58 )    Color: x / Appearance: x / SG: x / pH: x  Gluc: 120 mg/dL / Ketone: x  / Bili: x / Urobili: x   Blood: x / Protein: x / Nitrite: x   Leuk Esterase: x / RBC: x / WBC x   Sq Epi: x / Non Sq Epi: x / Bacteria: x        RADIOLOGY & ADDITIONAL TESTS:  New Results Reviewed Today:   New Imaging Personally Reviewed Today:  New Electrocardiogram Personally Reviewed Today:  Prior or Outpatient Records Reviewed Today:    COMMUNICATION:  Care Discussed with Consultants/Other Providers and Details of Discussion: Discussed with ACP  Discussions with Patient/Family:  PCP Communication:
Mohsin Khan, MD  Attending Physician, Division Of Hospital Medicine  Pager: [phone], Office: [phone]  Off hour pager: [phone]    Patient is a 39y old  Male who presents with a chief complaint of Shortness of breath     SUBJECTIVE / OVERNIGHT EVENTS:  Seen, examined the patient this am  Resting in bed, c/o SOB, no chest pain, afebrile, /79, Tele- SR     MEDICATIONS  (STANDING):  furosemide   Injectable 40 milliGRAM(s) IV Push every 12 hours  heparin   Injectable 5000 Unit(s) SubCutaneous every 8 hours  metoprolol tartrate 25 milliGRAM(s) Oral every 12 hours  potassium chloride    Tablet ER 40 milliEquivalent(s) Oral once  sacubitril 24 mG/valsartan 26 mG 1 Tablet(s) Oral two times a day  spironolactone 25 milliGRAM(s) Oral daily    MEDICATIONS  (PRN):      Vital Signs Last 24 Hrs  T(C): 36.5 (14 Jul 2024 10:55), Max: 36.7 (13 Jul 2024 17:25)  T(F): 97.7 (14 Jul 2024 10:55), Max: 98 (13 Jul 2024 17:25)  HR: 94 (14 Jul 2024 10:55) (80 - 99)  BP: 109/68 (14 Jul 2024 10:55) (105/75 - 119/85)  BP(mean): --  RR: 18 (14 Jul 2024 10:55) (18 - 18)  SpO2: 94% (14 Jul 2024 10:55) (94% - 97%)    Parameters below as of 14 Jul 2024 10:55  Patient On (Oxygen Delivery Method): room air      CAPILLARY BLOOD GLUCOSE        I&O's Summary    13 Jul 2024 07:01  -  14 Jul 2024 07:00  --------------------------------------------------------  IN: 720 mL / OUT: 2250 mL / NET: -1530 mL        PHYSICAL EXAM:-  GENERAL: NAD, well-developed  EYES: EOMI, PERRLA, conjunctiva and sclera clear  NECK: Supple, No JVD, no thyromegaly  CHEST/LUNG: Clear to auscultation bilaterally; No wheeze  HEART: Regular rate and rhythm; S1, S2 audible, No murmurs, rubs, or gallops  ABDOMEN: Soft, Nontender, Nondistended; Bowel sounds present  EXTREMITIES:  1+ LE edema, warm, pulses are pulpable  NEURO: AAOx3, no focal deficit      LABS:                        16.9   4.97  )-----------( 259      ( 13 Jul 2024 07:09 )             49.4     07-14    137  |  100  |  14  ----------------------------<  78  3.1<L>   |  25  |  1.19    Ca    9.2      14 Jul 2024 06:30  Phos  3.1     07-13  Mg     2.4     07-14            Urinalysis Basic - ( 14 Jul 2024 06:30 )    Color: x / Appearance: x / SG: x / pH: x  Gluc: 78 mg/dL / Ketone: x  / Bili: x / Urobili: x   Blood: x / Protein: x / Nitrite: x   Leuk Esterase: x / RBC: x / WBC x   Sq Epi: x / Non Sq Epi: x / Bacteria: x        RADIOLOGY & ADDITIONAL TESTS:    Imaging Personally Reviewed: CXR, Echo  Consultant(s) Notes Reviewed: Cardiology    
no complaints. eager to speak with chf team.    GENERAL: No fevers, no chills.  EYES: No blurry vision,  No photophobia  ENT: No sore throat.  No dysphagia  Cardiovascular: No chest pain, palpitations, orthopnea  Pulmonary: No cough, no wheezing. No shortness of breath  Gastrointestinal: No abdominal pain, no diarrhea, no constipation.  Musculoskeletal: No weakness.  No myalgias.  Dermatology:  No rashes.  Neuro: No Headache.  No vertigo.  No dizziness.  Psych: No anxiety, no depression.  Denies suicidal thoughts.  Hematology: No easy bruising.  No nose bleeds.  Allergy: No environmental allergies.    MEDICATIONS  (STANDING):  buMETAnide 1 milliGRAM(s) Oral daily  heparin   Injectable 5000 Unit(s) SubCutaneous every 8 hours  metoprolol succinate ER 50 milliGRAM(s) Oral daily  sacubitril 24 mG/valsartan 26 mG 1 Tablet(s) Oral two times a day  spironolactone 25 milliGRAM(s) Oral daily    MEDICATIONS  (PRN):    Vital Signs Last 24 Hrs  T(C): 36.5 (16 Jul 2024 04:47), Max: 37 (15 Jul 2024 20:14)  T(F): 97.7 (16 Jul 2024 04:47), Max: 98.6 (15 Jul 2024 20:14)  HR: 75 (16 Jul 2024 08:10) (75 - 80)  BP: 97/60 (16 Jul 2024 05:14) (92/62 - 107/73)  BP(mean): --  RR: 18 (16 Jul 2024 04:47) (18 - 18)  SpO2: 95% (16 Jul 2024 04:47) (95% - 95%)    Parameters below as of 16 Jul 2024 08:10  Patient On (Oxygen Delivery Method): room air    CONSTITUTIONAL: NAD, obese  RESPIRATORY: Normal respiratory effort; lungs are clear to auscultation bilaterally  CARDIOVASCULAR: Regular rate and rhythm, normal S1 and S2   ABDOMEN: Nontender to palpation, normoactive bowel sounds, no rebound/guarding; No hepatosplenomegaly  MUSCULOSKELETAL: no clubbing or cyanosis of digits; no joint swelling or tenderness to palpation  PSYCH: A+O to person, place, and time; affect appropriate  SKIN: No rashes; no palpable lesions    .  LABS:                         18.0   5.95  )-----------( 316      ( 15 Jul 2024 06:56 )             53.7     07-16    136  |  103  |  16  ----------------------------<  96  3.8   |  21<L>  |  1.10    Ca    8.9      16 Jul 2024 06:20  Phos  3.2     07-16  Mg     2.6     07-16        Urinalysis Basic - ( 16 Jul 2024 06:20 )    Color: x / Appearance: x / SG: x / pH: x  Gluc: 96 mg/dL / Ketone: x  / Bili: x / Urobili: x   Blood: x / Protein: x / Nitrite: x   Leuk Esterase: x / RBC: x / WBC x   Sq Epi: x / Non Sq Epi: x / Bacteria: x            RADIOLOGY, EKG & ADDITIONAL TESTS: Reviewed. 
Mohsin Khan, MD  Attending Physician, Division Of Hospital Medicine  Pager: [phone], Office: [phone]  Off hour pager: [phone]    Patient is a 39y old  Male who presents with a chief complaint of Shortness of breath     SUBJECTIVE / OVERNIGHT EVENTS:  Seen, examined the patient this am  Resting in bed, feels better with breathing, c/o SOB while bending, no chest pain, fever or cough, VSS, Tele- 8 beats WCT overnight at rate 140s    MEDICATIONS  (STANDING):  furosemide   Injectable 40 milliGRAM(s) IV Push every 12 hours  heparin   Injectable 5000 Unit(s) SubCutaneous every 8 hours  metoprolol tartrate 12.5 milliGRAM(s) Oral two times a day  potassium chloride    Tablet ER 40 milliEquivalent(s) Oral once  sacubitril 24 mG/valsartan 26 mG 1 Tablet(s) Oral two times a day  spironolactone 25 milliGRAM(s) Oral daily    MEDICATIONS  (PRN):      Vital Signs Last 24 Hrs  T(C): 36.6 (13 Jul 2024 04:29), Max: 36.8 (12 Jul 2024 11:20)  T(F): 97.8 (13 Jul 2024 04:29), Max: 98.3 (12 Jul 2024 11:20)  HR: 83 (13 Jul 2024 07:54) (56 - 84)  BP: 118/78 (13 Jul 2024 04:29) (118/78 - 146/95)  BP(mean): --  RR: 18 (13 Jul 2024 04:29) (18 - 18)  SpO2: 94% (13 Jul 2024 04:29) (94% - 98%)    Parameters below as of 13 Jul 2024 07:54  Patient On (Oxygen Delivery Method): room air      CAPILLARY BLOOD GLUCOSE      POCT Blood Glucose.: 143 mg/dL (12 Jul 2024 19:42)    I&O's Summary    12 Jul 2024 07:01  -  13 Jul 2024 07:00  --------------------------------------------------------  IN: 0 mL / OUT: 700 mL / NET: -700 mL        PHYSICAL EXAM:-  GENERAL: NAD, well-developed  EYES: EOMI, PERRLA, conjunctiva and sclera clear  NECK: Supple, No JVD, no thyromegaly  CHEST/LUNG: Clear to auscultation bilaterally; No wheeze  HEART: Regular rate and rhythm; S1, S2 audible, No murmurs, rubs, or gallops  ABDOMEN: Soft, Nontender, Nondistended; Bowel sounds present  EXTREMITIES:  LE 1+ edema, No clubbing, warm  NEURO: AAOx3, no focal deficit      LABS:                        16.9   4.97  )-----------( 259      ( 13 Jul 2024 07:09 )             49.4     07-13    141  |  104  |  14  ----------------------------<  76  3.5   |  22  |  1.20    Ca    9.0      13 Jul 2024 07:09  Phos  3.1     07-13  Mg     2.0     07-13    TPro  7.5  /  Alb  4.1  /  TBili  0.4  /  DBili  x   /  AST  18  /  ALT  42  /  AlkPhos  75  07-12    PT/INR - ( 12 Jul 2024 03:20 )   PT: 11.7 sec;   INR: 1.07 ratio         PTT - ( 12 Jul 2024 03:20 )  PTT:28.6 sec      Urinalysis Basic - ( 13 Jul 2024 07:09 )    Color: x / Appearance: x / SG: x / pH: x  Gluc: 76 mg/dL / Ketone: x  / Bili: x / Urobili: x   Blood: x / Protein: x / Nitrite: x   Leuk Esterase: x / RBC: x / WBC x   Sq Epi: x / Non Sq Epi: x / Bacteria: x        RADIOLOGY & ADDITIONAL TESTS:    Imaging Personally Reviewed: CXR, Echo  Care Discussed with Consultants/Other Providers: Card

## 2024-07-16 NOTE — PROGRESS NOTE ADULT - PROBLEM SELECTOR PLAN 3
-In March 2023- Echo showed EF 35%, Cath- normal coronaries and cardiac MRI showed enlarged LV with EF 24%- nonischemic  - Repeat Echo- EF 21%, some segmental dysfunction, mild grade 1 diastolic dysfunction  - Advised to stop drinking and have outpatient sleep study
In March 2023- Echo showed EF 35%, Cath- normal coronaries and cardiac MRI showed enlarged LV with EF 24%- nonischemic  - Repeat Echo- EF 21%, some segmental dysfunction, mild grade 1 diastolic dysfunction  - Possibly related to Alcohol/Viral or sarcoid. Advised to stop drinking and have outpatient sleep study  - Plans per cardiology
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
## 2024-07-16 NOTE — PROGRESS NOTE ADULT - NSPROGADDITIONALINFOA_GEN_ALL_CORE
disposition: chf consult pending  discussed with dr mathew  discussed with medicine acp    Joslyn Larose D.O.  Division of Hospital Medicine  Available on MS Teams
The necessity of the time spent during the encounter on this date of service was due to:   - Ordering, reviewing, and interpreting labs, testing, and imaging  - Independently obtaining a review of systems and performing a physical exam  - Reviewing prior hospitalization and where necessary, outpatient records  - Reviewing consultant recommendations/communicating with consultants  - Counselling and educating patient and family regarding interpretation of aforementioned items and plan of care    Time-based billing (NON-critical care). Total minutes spent: 60
Time spent personally 52 min
Time spent personally 55 min

## 2024-07-16 NOTE — DISCHARGE NOTE PROVIDER - CARE PROVIDER_API CALL
Ziggy Pereira  Cardiovascular Disease  [address] [place] [id number]  Phone: [phone]  Fax: [phone]  Follow Up Time:    Ziggy Pereira  Cardiovascular Disease  [address] [facility], [facility] [address]  [place] [address]  Phone: [phone]  Fax: [phone]  Follow Up Time:     Marin GhoshSergei  Cardiology  [address], [address] [place] [address]  Phone: [phone]  Fax: [phone]  Follow Up Time: 1 week

## 2024-07-16 NOTE — PROGRESS NOTE ADULT - PROBLEM SELECTOR PLAN 5
He has apnea during sleep per his statement  - Outpatient sleep study
He has apnea during sleep per his statement  - Outpatient sleep study
- Outpatient sleep study
He has apnea during sleep per his statement  - Outpatient sleep study

## 2024-07-16 NOTE — DISCHARGE NOTE NURSING/CASE MANAGEMENT/SOCIAL WORK - PATIENT PORTAL LINK FT
You can access the FollowMyHealth Patient Portal offered by [facility] by registering at the following website: [email or web address]. By joining [facility]’s FollowMyHealth portal, you will also be able to view your health information using other applications (apps) compatible with our system.

## 2024-07-16 NOTE — DISCHARGE NOTE PROVIDER - NSDCMRMEDTOKEN_GEN_ALL_CORE_FT
bumetanide 1 mg oral tablet: 1 tab(s) orally once a day  Entresto 24 mg-26 mg oral tablet: 1 tab(s) orally 2 times a day  Jardiance 25 mg oral tablet: 1 tab(s) orally once a day  metoprolol succinate 50 mg oral tablet, extended release: 1 tab(s) orally once a day  spironolactone 25 mg oral tablet: 1 tab(s) orally once a day   bumetanide 1 mg oral tablet: 1 tab(s) orally once a day  dapagliflozin 10 mg oral tablet: 1 tab(s) orally once a day  Entresto 24 mg-26 mg oral tablet: 1 tab(s) orally 2 times a day  metoprolol succinate 50 mg oral tablet, extended release: 1 tab(s) orally once a day  spironolactone 25 mg oral tablet: 1 tab(s) orally once a day

## 2024-07-16 NOTE — PROGRESS NOTE ADULT - PROBLEM SELECTOR PROBLEM 5
JEFF (obstructive sleep apnea)

## 2024-07-16 NOTE — PROGRESS NOTE ADULT - PROBLEM SELECTOR PLAN 2
Has 8 beats WCT at HR 140s on 7/13  - KCL 40meq po x 1 given yesterday  - Increase Metoprolol 25mg bid  - EP consult for ICD
- c/w Metoprolol 25mg bid  - per EP, no role for ICD at this time until patient can ensure compliance to medications
Has 8 beats WCT at HR 140s overnight, K+ 3.5. Unclear related to low K+ or Ischemic CMP  - KCL 40meq po x 1 given this am  - Increase Metoprolol 25mg bid
Has 8 beats WCT at HR 140s on 7/13  - Maintain Mg ~2, K ~4  - Increase Metoprolol 25mg bid  - EP consult for ICD

## 2024-07-16 NOTE — DISCHARGE NOTE PROVIDER - CARE PROVIDERS DIRECT ADDRESSES
,DirectAddress_Unknown ,DirectAddress_Unknown,[email or web address]

## 2024-07-16 NOTE — DISCHARGE NOTE PROVIDER - HOSPITAL COURSE
The patient is a 39 M with h/o NICM ( EF 35% in March 2023), HTN, JEFF, noncompliant on medications presenting with SOB on exertion and movements lately, some chest discomfort with no radiation, sweats, N/V for about a week. He took Lasix this morning but is not compliant with regularly scheduled medications- Aldactone, Entresto, Metoprolol. Denies recent travel, fever but some dry cough when he is on AC fr hot weather. He was seen and evaluated by cardiologist in March 2023- Echo showed EF 35%, Cath- normal coronaries and cardiac MRI showed enlarged LV with EF 24%- nonischemic. The patient has been drinking Vodka shots each week and gave up smoking and Vaping since last year per statement. He doesn't have follow up with any PCP or cardiologist.    In ED his /88, O2 sat 97% RA, CXR- interstitial edema, , got IV Lasix 40mg x1 and KCL.    ·  Problem: Acute on chronic systolic congestive heart failure.   ·  Plan: - c/w bumex po daily  - c/w Metoprolol 25mg bid, and c/w Aldactone 25mg/d, Entresto 24-26mg bid  - CHF consult pending- Dr Marion to see, discussed  - per EP, no role for ICD at this time until patient can ensure compliance to medications  - Patient has been noncompliant on meds- Metoprolol, Entresto, Aldactone. Has not seen PCP or cardiologist since March 2023.  - In March 2023- Echo showed EF 35%, Cath- normal coronaries and cardiac MRI showed enlarged LV with EF 24%- nonischemic.  - Repeat Echo- EF 21%, some segmental dysfunction, mild grade 1 diastolic dysfunction  - Daily weight, I/Os.    ·  Problem: Regular wide QRS complex tachycardia.   ·  Plan: - c/w Metoprolol 25mg bid  - per EP, no role for ICD at this time until patient can ensure compliance to medications.    ·  Problem: NICM (nonischemic cardiomyopathy).   ·  Plan: -In March 2023- Echo showed EF 35%, Cath- normal coronaries and cardiac MRI showed enlarged LV with EF 24%- nonischemic  - Repeat Echo- EF 21%, some segmental dysfunction, mild grade 1 diastolic dysfunction  - Advised to stop drinking and have outpatient sleep study.    ·  Problem: HTN (hypertension).   ·  Plan: - bp stable  - c/w bumex, metoprolol Er 25mg, Aldactone 25mg, Entresto 24-26mg/d.    ·  Problem: JEFF (obstructive sleep apnea).   ·  Plan: - Outpatient sleep study.    Pt stable dc home with outpatient follow up with PMD, HF follow-up, and sleep study.

## 2024-07-16 NOTE — PROGRESS NOTE ADULT - PROBLEM SELECTOR PROBLEM 2
Regular wide QRS complex tachycardia

## 2024-07-16 NOTE — CONSULT NOTE ADULT - PROBLEM SELECTOR RECOMMENDATION 9
NYHA Class II  - GDMT: Entresto 24/26, spironolactone 25 mg, Toprol XL 50 mg. Recommend starting SGLT2i (Jardiance or Farxiga)  - Diuretic: Currently euvolemic - continue umex 1 mg pO daily  - Further work-up: Patient to follow-up 7/23 @ 15:00 in [facility] cardiomyopathy clinic. Will plan for PET/CT at that time to evaluate for sarcoid. Patient will need follow-up with Dr. Marin Ghosh for genetic testing.   - EtOH cessation   - Mild luminal disease on LHC with elevated LDL - statin therapy is reasonable

## 2024-07-16 NOTE — PROGRESS NOTE ADULT - PROBLEM SELECTOR PROBLEM 3
NICM (nonischemic cardiomyopathy)

## 2024-07-16 NOTE — DISCHARGE NOTE NURSING/CASE MANAGEMENT/SOCIAL WORK - NSDCPEWEB_GEN_ALL_CORE
[facility] for Tobacco Control website --- [email or web address] --- [email or web address]

## 2024-07-16 NOTE — DISCHARGE NOTE PROVIDER - NSDCFUADDAPPT_GEN_ALL_CORE_FT
Follow up at Heart Failure clinic on 7/23/24 at 3pm at [address] [place] [address].   Follow up with your PMD and Cardiologist as an outpatient.  Follow up at Heart Failure clinic on 7/23/24 at 3pm at [address] [place] [address].   Follow up with your PMD and Cardiologist as an outpatient.   Follow up with Dr. Ghosh for genetic testing.

## 2024-07-16 NOTE — DISCHARGE NOTE NURSING/CASE MANAGEMENT/SOCIAL WORK - NSDCPEEMAIL_GEN_ALL_CORE
[facility] for Tobacco Control email [email or web address]

## 2024-07-16 NOTE — CONSULT NOTE ADULT - ASSESSMENT
39 M with h/o NICM/HFrEF (EF 24%), HTN, JEFF, presents with OLIVEIRA. Etiology of is CM is unknown at this time, extensive work-up has so far not be conclusively diagnostic, however the pattern of LGE on cMRI should be further evaluated. EtOH may certainly be a contributing factor and complete EtOH cessation has been discussed with the patient. He is currently warm and well perfused, euvolemic appearing, and tolerating GDMT. The importance of GDMT and follow-up was discussed and patient expressed understanding.

## 2024-07-23 ENCOUNTER — APPOINTMENT (OUTPATIENT)
Dept: HEART FAILURE | Facility: CLINIC | Age: 39
End: 2024-07-23
Payer: SELF-PAY

## 2024-07-23 VITALS
OXYGEN SATURATION: 98 % | WEIGHT: 240 LBS | BODY MASS INDEX: 33.6 KG/M2 | DIASTOLIC BLOOD PRESSURE: 68 MMHG | HEIGHT: 71 IN | SYSTOLIC BLOOD PRESSURE: 105 MMHG | TEMPERATURE: 97.5 F | HEART RATE: 64 BPM

## 2024-07-23 DIAGNOSIS — Z00.00 ENCOUNTER FOR GENERAL ADULT MEDICAL EXAMINATION W/OUT ABNORMAL FINDINGS: ICD-10-CM

## 2024-07-23 DIAGNOSIS — I50.20 UNSPECIFIED SYSTOLIC (CONGESTIVE) HEART FAILURE: ICD-10-CM

## 2024-07-23 PROCEDURE — 99213 OFFICE O/P EST LOW 20 MIN: CPT | Mod: NC

## 2024-07-23 PROCEDURE — 99214 OFFICE O/P EST MOD 30 MIN: CPT

## 2024-07-23 RX ORDER — DAPAGLIFLOZIN 10 MG/1
10 TABLET, FILM COATED ORAL
Qty: 90 | Refills: 2 | Status: DISCONTINUED | COMMUNITY
Start: 2024-07-22 | End: 2024-07-23

## 2024-07-23 RX ORDER — SACUBITRIL AND VALSARTAN 24; 26 MG/1; MG/1
24-26 TABLET, FILM COATED ORAL TWICE DAILY
Qty: 120 | Refills: 1 | Status: DISCONTINUED | COMMUNITY
Start: 2024-07-22 | End: 2024-07-23

## 2024-07-23 RX ORDER — LOSARTAN POTASSIUM 25 MG/1
25 TABLET, FILM COATED ORAL TWICE DAILY
Qty: 60 | Refills: 0 | Status: ACTIVE | COMMUNITY
Start: 2024-07-23 | End: 1900-01-01

## 2024-07-23 RX ORDER — METOPROLOL SUCCINATE 50 MG/1
50 TABLET, EXTENDED RELEASE ORAL DAILY
Qty: 60 | Refills: 1 | Status: ACTIVE | COMMUNITY
Start: 2024-07-22 | End: 1900-01-01

## 2024-07-23 RX ORDER — BUMETANIDE 1 MG/1
1 TABLET ORAL DAILY
Qty: 60 | Refills: 1 | Status: ACTIVE | COMMUNITY
Start: 2024-07-22 | End: 1900-01-01

## 2024-07-23 NOTE — HISTORY OF PRESENT ILLNESS
[FreeTextEntry1] : Mr Melissa is a 39 M with h/o ACC/AHA Stage C NICM/HFrEF (LVIDd 6.1cm, LVEF 20-25%), HTN, JEFF, presents today for HFU for cardiomyopathy.   HPI:  His history begins last year when he was diagnosed with NICM 3/2023 when he presented with dyspnea and found to have LV dysfunction. His LHC was unremarkable and he had notable LGE. He was initiated on GDMT but did not make followup appointment and has had medication noncompliance as he was unaware of the utility of his medications.    Patient was born in [facility] and has lived in  since age 25. He has a history of frequent EtOH use consuming 3-6 shots of vodka daily and has vaped but denies recreational drug use.   He has no family history of CM.   He presented [facility] (7/12/24 to 7/16/24) for decompensated HF i/s/o being off of GDMT. Required IV diuretics during admission and had a repeat TTE showing LVEF of 20-25%. Had in-depth discussion during inpatient about medication compliance and followup which he states he understnands. Discharge on 7/16/24: K 3.8, Cr 1.1, Mg 2.6, proBNP 478 on 7/12/24. Medications of entresto 24/26mg BID, toprol XL 50mg QD, ajit 25mg QD, and farxiga 10mg QD with diuretics of bumex 1mg QD. Weight on d/c was 240.5lb.   Since discharge Mr Torres states that he is feeling better overall and can walk unlimited distance without any SOB. He states that he has difficulty sleeping at night and would eat a lot of food to aid with his sleep. He denies any orthopnea/PND. He doesn't check his SBP, but he said that the last time at home he check his weight was 240lb. Of note, he is going to [facility] from 7/24/24 to 9/24/24.   He otherwise denies any CP/palpitation, SOB at rest, LH/dizziness, orthopnea/PND and BLE edema. He said that since d/c from hospital, he has not drank any ETOH or smoke. No recreational drug use.

## 2024-07-23 NOTE — HISTORY OF PRESENT ILLNESS
[FreeTextEntry1] : Mr Melissa is a 39 M with h/o ACC/AHA Stage C NICM/HFrEF (LVIDd 6.1cm, LVEF 20-25%), HTN, JEFF, presents today for HFU for cardiomyopathy.   HPI:  His history begins last year when he was diagnosed with NICM 3/2023 when he presented with dyspnea and found to have LV dysfunction. His LHC was unremarkable and he had notable LGE. He was initiated on GDMT but did not make followup appointment and has had medication noncompliance as he was unaware of the utility of his medications.    Patient was born in [facility] and has lived in  since age 25. He has a history of frequent EtOH use consuming 3-6 shots of vodka daily and has vaped but denies recreational drug use.   He has no family history of CM.   He presented [facility] (7/12/24 to 7/16/24) for decompensated HF i/s/o being off of GDMT. Required IV diuretics during admission and had a repeat TTE showing LVEF of 20-25%. Had in-depth discussion during inpatient about medication compliance and followup which he states he understnands. Discharge on 7/16/24: K 3.8, Cr 1.1, Mg 2.6, proBNP 478 on 7/12/24. Medications of entresto 24/26mg BID, toprol XL 50mg QD, ajit 25mg QD, and farxiga 10mg QD with diuretics of bumex 1mg QD. Weight on d/c was 240.5lb.   Since discharge Mr Torres states that he is feeling better overall and can walk unlimited distance without any SOB. He states that he has difficulty sleeping at night and would eat a lot of food to aid with his sleep. He denies any orthopnea/PND. He doesn't check his SBP, but he said that the last time at home he check his weight was 240lb. Of note, he is going to [facility] from 7/24/24 to 9/24/24.   He otherwise denies any CP/palpitation, SOB at rest, LH/dizziness, orthopnea/PND and BLE edema. He said that since d/c from hospital, he has not drank any ETOH or smoke. No recreational drug use.

## 2024-07-23 NOTE — ASSESSMENT
[FreeTextEntry1] : Mr Melissa is a 39 M with h/o ACC/AHA Stage C NICM/HFrEF (LVIDd 6.1cm, LVEF 20-25%), HTN, JEFF, presents today for HFU for cardiomyopathy. He is ACC/AHA Stage C, NYHA Class I-II symptoms, euvolemic, normotensive tolerating well on some low-mod GDMT at this time. Will keep the medications the same dose at this time as he tolerating well and he will be traveling for 2 months out of the country. Plan as below  #HFrEF -Etiology: NICM, Does have abnormal LGE. Also possible contribution from ETOH use -GDMT: c/w entresto 24/26mg BID, toprol XL 50mg QD, ajit 25mg QD, and farxiga 10mg QD  -Diuretics: c/w bumex 1 mg QD -Device: will reassess LVEF when tolerated on max dose GDMT and see if primary prevention ICD is warranted -Labs 7/16/24: K 3.8, Cr 1.1, Mg 2.6, proBNP 478 on 7/12/24 -Educated patient on importance of low salt diet. -can benefit from cardiac rehab - discuss upon return from travels -Can benefit PET/CT and genetic testing once return from travels  #HTN -GDMT as above  #Health Maintainence -applaud patient for abstaining ETOH since D/C -educated patient on importance of no smoking or vaping -will need a PCP setup upon [place] to the states   Will have NP followup upon return from his travels 1st week October and optimize GDMTs then. In meantime, will send 2 months supply of his GDMTs. Will see Dr. Marion at next available appointment. Education provided on monitoring his diet with high salt and sugar Explained to patient the importance of consistent medication compliance and to go to the nearest hospital should he have any s/s of heart failure while in [place].

## 2024-07-23 NOTE — PHYSICAL EXAM
[Well Developed] : well developed [Well Nourished] : well nourished [Normal] : normal S1, S2, no murmur, no rub, no gallop [Clear Lung Fields] : clear lung fields [Good Air Entry] : good air entry [Normal Gait] : normal gait [No Edema] : no edema [No Rash] : no rash [Moves all extremities] : moves all extremities [No Focal Deficits] : no focal deficits [Alert and Oriented] : alert and oriented [Normal memory] : normal memory [de-identified] : JVP 6-8 cm H20 [de-identified] : round, non distended [de-identified] : warm peripherally

## 2024-07-23 NOTE — ASSESSMENT
[FreeTextEntry1] : Mr Melissa is a 39 M with h/o ACC/AHA Stage C NICM/HFrEF (LVIDd 6.1cm, LVEF 20-25%), HTN, JEFF, presents today for HFU for cardiomyopathy. He is ACC/AHA Stage C, NYHA Class I-II symptoms, euvolemic, normotensive tolerating well on some low-mod GDMT at this time. Will keep the medications the same dose at this time as he tolerating well and he will be traveling [place] the country. Plan as below  #HFrEF -Etiology: NICM, Does have abnormal LGE. Also possible contribution from ETOH use -GDMT: c/w entresto 24/26mg BID, toprol XL 50mg QD, ajit 25mg QD, and farxiga 10mg QD  -Diuretics: c/w bumex 1 mg QD -Device: will reassess LVEF when tolerated on max dose GDMT and see if primary prevention ICD is warranted -Labs 7/16/24: K 3.8, Cr 1.1, Mg 2.6, proBNP 478 on 7/12/24 -Educated patient on importance of low salt diet. -can benefit from cardiac rehab - discuss upon return from travels -Can benefit PET/CT and genetic testing once return from travels  #HTN -GDMT as above  #Health Maintainence -applaud patient for abstaining ETOH since D/C -educated patient on importance of no smoking or vaping -will need a PCP setup upon return to the states   Will have NP followup upon return from his travels 1st week October and optimize GDMTs then. In meantime, will send 2 months supply of his GDMTs. Will see Dr. Marion at next available appointment. Education provided on monitoring his diet with high salt and sugar Explained to patient the importance of consistent medication compliance and to go to the nearest hospital should he have any s/s of heart failure while in [place].

## 2024-07-23 NOTE — CARDIOLOGY SUMMARY
[de-identified] : Echo: TTE 7/12/2024: LV 6.1 cm, LVEF 20-25% with global hypokinesis, LVOT VTI 10 cm, normal RV size/function, no significant valvular disease, IVC collapsing    [de-identified] : CT/MRI: [facility] 3/2023: LVEF 24%, extensive circumferential midmyocardial and subepicardial LGE which can be seen in myocarditis or sarcoidosis, RVEF 48%     Cardiac Cath/PCI: [facility] 3/2023: luminal irregularities  [de-identified] : [facility] 3/2023: RA 9, PA 52/33, PCWP 39, Sudha CO/CI 4.2/2.0

## 2024-07-23 NOTE — CARDIOLOGY SUMMARY
[de-identified] : CT/MRI: [facility] 3/2023: LVEF 24%, extensive circumferential midmyocardial and subepicardial LGE which can be seen in myocarditis or sarcoidosis, RVEF 48%     Cardiac Cath/PCI: [facility] 3/2023: luminal irregularities  [de-identified] : Echo: TTE 7/12/2024: LV 6.1 cm, LVEF 20-25% with global hypokinesis, LVOT VTI 10 cm, normal RV size/function, no significant valvular disease, IVC collapsing    [de-identified] : [facility] 3/2023: RA 9, PA 52/33, PCWP 39, Sudha CO/CI 4.2/2.0

## 2024-07-23 NOTE — PHYSICAL EXAM
[Well Developed] : well developed [Well Nourished] : well nourished [Normal] : normal S1, S2, no murmur, no rub, no gallop [Clear Lung Fields] : clear lung fields [Good Air Entry] : good air entry [Normal Gait] : normal gait [No Edema] : no edema [No Rash] : no rash [Moves all extremities] : moves all extremities [No Focal Deficits] : no focal deficits [Alert and Oriented] : alert and oriented [Normal memory] : normal memory [de-identified] : JVP 6-8 cm H20 [de-identified] : round, non distended [de-identified] : warm peripherally

## 2024-08-22 PROCEDURE — 87389 HIV-1 AG W/HIV-1&-2 AB AG IA: CPT

## 2024-08-22 PROCEDURE — 82803 BLOOD GASES ANY COMBINATION: CPT

## 2024-08-22 PROCEDURE — 85025 COMPLETE CBC W/AUTO DIFF WBC: CPT

## 2024-08-22 PROCEDURE — 71045 X-RAY EXAM CHEST 1 VIEW: CPT

## 2024-08-22 PROCEDURE — 85018 HEMOGLOBIN: CPT

## 2024-08-22 PROCEDURE — 82962 GLUCOSE BLOOD TEST: CPT

## 2024-08-22 PROCEDURE — 84443 ASSAY THYROID STIM HORMONE: CPT

## 2024-08-22 PROCEDURE — 85014 HEMATOCRIT: CPT

## 2024-08-22 PROCEDURE — 80048 BASIC METABOLIC PNL TOTAL CA: CPT

## 2024-08-22 PROCEDURE — 99285 EMERGENCY DEPT VISIT HI MDM: CPT | Mod: 25

## 2024-08-22 PROCEDURE — 83036 HEMOGLOBIN GLYCOSYLATED A1C: CPT

## 2024-08-22 PROCEDURE — 85730 THROMBOPLASTIN TIME PARTIAL: CPT

## 2024-08-22 PROCEDURE — 83605 ASSAY OF LACTIC ACID: CPT

## 2024-08-22 PROCEDURE — 93005 ELECTROCARDIOGRAM TRACING: CPT

## 2024-08-22 PROCEDURE — 93356 MYOCRD STRAIN IMG SPCKL TRCK: CPT

## 2024-08-22 PROCEDURE — 84295 ASSAY OF SERUM SODIUM: CPT

## 2024-08-22 PROCEDURE — 84100 ASSAY OF PHOSPHORUS: CPT

## 2024-08-22 PROCEDURE — 82947 ASSAY GLUCOSE BLOOD QUANT: CPT

## 2024-08-22 PROCEDURE — 84484 ASSAY OF TROPONIN QUANT: CPT

## 2024-08-22 PROCEDURE — 82435 ASSAY OF BLOOD CHLORIDE: CPT

## 2024-08-22 PROCEDURE — 83735 ASSAY OF MAGNESIUM: CPT

## 2024-08-22 PROCEDURE — 83880 ASSAY OF NATRIURETIC PEPTIDE: CPT

## 2024-08-22 PROCEDURE — 36415 COLL VENOUS BLD VENIPUNCTURE: CPT

## 2024-08-22 PROCEDURE — 85027 COMPLETE CBC AUTOMATED: CPT

## 2024-08-22 PROCEDURE — 96374 THER/PROPH/DIAG INJ IV PUSH: CPT

## 2024-08-22 PROCEDURE — 80061 LIPID PANEL: CPT

## 2024-08-22 PROCEDURE — 84132 ASSAY OF SERUM POTASSIUM: CPT

## 2024-08-22 PROCEDURE — 80053 COMPREHEN METABOLIC PANEL: CPT

## 2024-08-22 PROCEDURE — 85610 PROTHROMBIN TIME: CPT

## 2024-08-22 PROCEDURE — 82330 ASSAY OF CALCIUM: CPT

## 2024-09-03 NOTE — CONSULT NOTE ADULT - SUBJECTIVE AND OBJECTIVE BOX
09/01/24 1723   Vital Signs   Temp 97.7 °F (36.5 °C)   Temp Source Oral   Pulse (!) 101   Heart Rate Source Monitor   /77   MAP (Calculated) 97   BP Location Left upper arm   BP Method Automatic   Patient Position Sitting   Pain Assessment   Pain Assessment None - Denies Pain   Oxygen Therapy   SpO2 92 %   O2 Device Nasal cannula   O2 Flow Rate (L/min) 2 L/min   Height and Weight   Height 1.651 m (5' 5\")   Weight - Scale (!) 137.1 kg (302 lb 5 oz)   Weight Method Standing scale   BSA (Calculated - sq m) 2.51 sq meters   BMI (Calculated) 50.4     Pt arrived via w/c to unit in stable conditions. Belongings at bedside. Currently on 2L. R fields significantly diminished. Finished IV atb in ED. 1+ pitting edema noted as well as rash to ble, which pt reports is cellulitis and has been there since March. Was on doxycycline for this. All admission questions answered by pt. Home medications reviewed. Inventory of belongings taken. Head to toe assessment completed. Dietary aware of arrival and pt had tray given. Call light and bedside table within reach.       09/02/24 0754   Vital Signs   Temp 97 °F (36.1 °C)   Temp Source Oral   Pulse 83   Heart Rate Source Monitor   Respirations 16   /87   MAP (Calculated) 100   BP Location Left upper arm   BP Method Automatic   Patient Position Semi fowlers   Pain Assessment   Pain Assessment None - Denies Pain   Pain Level 0   Opioid-Induced Sedation   POSS Score 1   Oxygen Therapy   SpO2 94 %   O2 Device Nasal cannula   O2 Flow Rate (L/min) 2 L/min     AM assessment completed, see flow sheet. Pt is alert and oriented. Vital signs are WNL. Respirations are even & easy. No complaints voiced. Pt denies needs at this time. SR up x 2, and bed in low position. Call light is within reach.       09/03/24 0725   Handoff   Communication Given Shift Handoff   Handoff Given To Maria YIP   Handoff Received From Darya YIP   Handoff Communication Face to Face;At bedside   Time Handoff Given 0725   End of Shift Check Performed Yes     Pt awake in bed. Not in respiratory distress. Denies needs. Call light within reach.       09/03/24 0830   Vital Signs   Temp 97.5 °F (36.4 °C)   Temp Source Oral   Pulse 99   Heart Rate Source Monitor   Respirations 18   /68   MAP (Calculated) 85   BP Location Left upper arm   BP Method Automatic   Pain Assessment   Pain Assessment None - Denies Pain   Opioid-Induced Sedation   POSS Score 1   RASS   [place]reola Agitation Sedation Scale (RASS) 0   Oxygen Therapy   SpO2 94 %   O2 Device None (Room air)     Alert and oriented. Skin wd. Resp e/e unlabored. Meds given. Nrsg asmt completed. See flow sheet and MAR. No s/s distress noted. Call light in easy reach. No s/s distress noted.  States does not wear cpap at home d/t repossessed from company.    Pt has chronic IBS. No changes in normal pattern of stools.   Shift assessment complete. See doc flow. Nightly medications given see MAR. A/O x4. COPD. PNA. Requiring 2L O2 via nc, no oxygen at baseline. Diabetic. FS (497). Blood Glucose has been over 450 since the ER and since being admitted to the unit. Gave ordered Humalog (4) units and Perfect Served MD for further coverage and management. Educated patient carb control diet and snacks, pt stated understanding and admitted she needs more education. Pt ambulates independently. Call light and bedside table within easy reach.    09/01/24 2100   Vital Signs   Temp 98 °F (36.7 °C)   Temp Source Oral   Pulse (!) 107   Heart Rate Source Monitor   Respirations 18   /86   MAP (Calculated) 98   BP Location Left upper arm   BP Method Automatic   Patient Position Sitting   Oxygen Therapy   SpO2 96 %   O2 Device Nasal cannula   O2 Flow Rate (L/min) 2 L/min        patient to elevate off the bed or chair (seated to standing) using an assistive device (cane, bedrail).    2. Patient should be able to raise buttocks off be and hold for a count of five. May repeat once.   Pass- Patient maintains standing stability for at least 5 seconds, proceed to assessment level 4.    Assessment Level 4- Walk   1. Ask patient to march in place at bedside.    2. Then ask patient to advance step and return each foot. Some medical conditions may render a patient from stepping backwards, use your best clinical judgement.   Pass- Patient demonstrates balance while shifting weight and ability to step, takes independent steps, does not use assistive device patient is MOBILITY LEVEL 4.      Mobility Level- 4     Patient is a 38y old  Male who presents with a chief complaint of sob/cp (10 Mar 2023 19:29)    HPI:    38  yr year-old male      witn no pmh       presents with shortness of breath, cough, and chest pain .  for  past  week or more        His symptoms have been getting progressively worse.   denies  fevers or chills.   pt   flew to the  back from [facility]   about 2 weeks ago.   hypoxic on arrival   Ct chest angio, no pe  (10 Mar 2023 18:57)       REVIEW OF SYSTEMS  [  ] ROS unobtainable because:    [ x ] All other systems negative except as noted below    Constitutional:  [ ] fever [ ] chills  [ ] weight loss  [ ]night sweat  [ ]poor appetite/PO intake [ ]fatigue   Skin:  [ ] rash [ ] phlebitis	  Eyes: [ ] icterus [ ] pain  [ ] discharge	  ENMT: [ ] sore throat  [ ] thrush [ ] ulcers [ ] exudates [ ]anosmia  Respiratory: [ ] dyspnea [ ] hemoptysis [ ] cough [ ] sputum	  Cardiovascular:  [ ] chest pain [ ] palpitations [ ] edema	  Gastrointestinal:  [ ] nausea [ ] vomiting [ ] diarrhea [ ] constipation [ ] pain	  Genitourinary:  [ ] dysuria [ ] frequency [ ] hematuria [ ] discharge [ ] flank pain  [ ] incontinence  Musculoskeletal:  [ ] myalgias [ ] arthralgias [ ] arthritis  [ ] back pain  Neurological:  [ ] headache [ ] weakness [ ] seizures  [ ] confusion/altered mental status    prior hospital charts reviewed [V]  primary team notes reviewed [V]  other consultant notes reviewed [V]    PAST MEDICAL & SURGICAL HISTORY:  No pertinent past medical history      No significant past surgical history          SOCIAL HISTORY:  - Denied smoking/vaping/alcohol/recreational drug use    FAMILY HISTORY:      Allergies  No Known Allergies        ANTIMICROBIALS:      ANTIMICROBIALS (past 90 days):  MEDICATIONS  (STANDING):        OTHER MEDS:   MEDICATIONS  (STANDING):  furosemide   Injectable 40 daily  heparin   Injectable 5000 every 12 hours  influenza   Vaccine 0.5 once  sacubitril 24 mG/valsartan 26 mG 1 two times a day      VITALS:  Vital Signs Last 24 Hrs  T(F): 97 (03-11-23 @ 04:21), Max: 98.2 (03-10-23 @ 13:30)    Vital Signs Last 24 Hrs  HR: 95 (03-11-23 @ 05:45) (74 - 107)  BP: 121/83 (03-11-23 @ 04:21) (121/83 - 159/142)  RR: 18 (03-11-23 @ 04:21)  SpO2: 93% (03-11-23 @ 05:45) (92% - 100%)  Wt(kg): --    EXAM:    GA: NAD, AOx3  HEENT: oral cavity no lesion  CV: nl S1/S2, no RMG  Lungs: CTAB, No distress  Abd: BS+, soft, nontender, no rebounding pain  Ext: no edema  Neuro: No focal deficits  Skin: Intact  IV: no phlebitis    Labs:                        17.9   11.26 )-----------( 314      ( 11 Mar 2023 06:20 )             53.8     03-11    140  |  105  |  11  ----------------------------<  87  3.5   |  22  |  0.91    Ca    8.5      11 Mar 2023 06:17  Mg     2.2     03-11    TPro  6.8  /  Alb  3.8  /  TBili  0.4  /  DBili  x   /  AST  24  /  ALT  41  /  AlkPhos  62  03-10      WBC Trend:  WBC Count: 11.26 (03-11-23 @ 06:20)  WBC Count: 8.42 (03-10-23 @ 11:43)      Auto Neutrophil #: 6.77 K/uL (03-10-23 @ 11:43)      Creatine Trend:  Creatinine, Serum: 0.91 (03-11)  Creatinine, Serum: 1.06 (03-10)      Liver Biochemical Testing Trend:  Alanine Aminotransferase (ALT/SGPT): 41 (03-10)  Aspartate Aminotransferase (AST/SGOT): 24 (03-10-23 @ 11:43)  Bilirubin Total, Serum: 0.4 (03-10)  Trend LDH  Auto Eosinophil %: 0.1 % (03-10-23 @ 11:43)  MICROBIOLOGY:  Rapid RVP Result: NotDetec (03-10 @ 11:42)  C-Reactive Protein, Serum: 17 (03-11)  Troponin T, High Sensitivity Result: 12 (03-10)  Troponin T, High Sensitivity Result: 19 (03-10)    Blood Gas Arterial, Lactate: 1.0 (03-10 @ 23:06)  Blood Gas Venous - Lactate: 1.5 (03-10 @ 11:41)      CSF:      RADIOLOGY:    < from: CT Angio Chest PE Protocol w/ IV Cont (03.10.23 @ 15:40) >    INTERPRETATION:  CLINICAL INDICATION: Rule out pulmonary embolism    TECHNIQUE: A volumetric acquisition of the chest was obtained from the   thoracic inlet to the upper abdomen during dynamic administration of 90cc   intravenous contrast according to the PE protocol. 3D MIP images were   provided.    COMPARISON: None    FINDINGS:  CTA: The study is technically adequate with a good contrast bolus to the   pulmonary arteries. No pulmonary embolism. Enlarged pulmonary artery (3.2   cm). Mid ascending aorta measures 3.6 cm. The heart is enlarged. No   coronary artery calcification. No pericardial effusion.    Lungs/Airways/Pleura: Trace pleural effusions. Diffuse interlobular   septal thickening and central groundglass opacity consistent with   pulmonary edema. No pneumothorax. The central airways are patent.    Mediastinum/Lymph nodes: Mediastinal and hilar lymphadenopathy, likely   reactive in this setting.    Upper Abdomen: Partially calcified right renal nodule.    Bones and Soft Tissues: No aggressive osseous lesions.    IMPRESSION:  No pulmonary embolism.    Pulmonary edema with trace pleural effusions in thesetting of   cardiomegaly.    < end of copied text >  < from: Xray Chest 1 View- PORTABLE-Urgent (03.10.23 @ 12:41) >    INTERPRETATION:  EXAMINATION: XR CHEST URGENT    CLINICAL INDICATION: Chest Pain    TECHNIQUE: Single frontal, portable view ofthe chest was obtained.    COMPARISON: CT 3/10/2022    FINDINGS:  LUNGS: Bilateral hazy opacifications..  PLEURA: No pleural effusion or pneumothorax.  HEART AND MEDIASTINUM: Heart size is within normal limits.  SKELETON: There is no acute osseus abnormality,.    IMPRESSION:  Bilateral hazy opacifications, which may represent pulmonary edema or   pneumonia.    < end of copied text >                   Patient is a 38y old  Male who presents with a chief complaint of sob/cp (10 Mar 2023 19:29)    HPI:  38M traveled to [facility] 1/23-2/20/23.   One week into his stay developed fevers, diagnosed with yellow fever, improved.   He reports he often gets yellow fever in his country and it's fine.   Says it's from eating contaminated foods but I advised him it's from mosquitoes.   He verified he's talking about yellow fever.   Here 3/10 with dyspnea for about a week.   Found to have pulmonary edema, cardiomegaly.   Never happened before, no known heart or lung disease.   No fever or chills recurrence. No rash or pain.   Feels better.       REVIEW OF SYSTEMS  [  ] ROS unobtainable because:    [ x ] All other systems negative except as noted below    Constitutional:  [ ] fever [ ] chills  [ ] weight loss  [ ]night sweat  [ ]poor appetite/PO intake [ ]fatigue   Skin:  [ ] rash [ ] phlebitis	  Eyes: [ ] icterus [ ] pain  [ ] discharge	  ENMT: [ ] sore throat  [ ] thrush [ ] ulcers [ ] exudates [ ]anosmia  Respiratory: [X ] dyspnea [ ] hemoptysis [ ] cough [ ] sputum	  Cardiovascular:  [ ] chest pain [ ] palpitations [ ] edema	  Gastrointestinal:  [ ] nausea [ ] vomiting [ ] diarrhea [ ] constipation [ ] pain	  Genitourinary:  [ ] dysuria [ ] frequency [ ] hematuria [ ] discharge [ ] flank pain  [ ] incontinence  Musculoskeletal:  [ ] myalgias [ ] arthralgias [ ] arthritis  [ ] back pain  Neurological:  [ ] headache [ ] weakness [ ] seizures  [ ] confusion/altered mental status    prior hospital charts reviewed [V]  primary team notes reviewed [V]  other consultant notes reviewed [V]    PAST MEDICAL & SURGICAL HISTORY:  No pertinent past medical history      No significant past surgical history          SOCIAL HISTORY:  - Denied smoking/vaping/alcohol/recreational drug use    FAMILY HISTORY:      Allergies  No Known Allergies        ANTIMICROBIALS:      ANTIMICROBIALS (past 90 days):  MEDICATIONS  (STANDING):        OTHER MEDS:   MEDICATIONS  (STANDING):  furosemide   Injectable 40 daily  heparin   Injectable 5000 every 12 hours  influenza   Vaccine 0.5 once  sacubitril 24 mG/valsartan 26 mG 1 two times a day      VITALS:  Vital Signs Last 24 Hrs  T(F): 97 (03-11-23 @ 04:21), Max: 98.2 (03-10-23 @ 13:30)    Vital Signs Last 24 Hrs  HR: 95 (03-11-23 @ 05:45) (74 - 107)  BP: 121/83 (03-11-23 @ 04:21) (121/83 - 159/142)  RR: 18 (03-11-23 @ 04:21)  SpO2: 93% (03-11-23 @ 05:45) (92% - 100%)  Wt(kg): --    EXAM:    GA: NAD, AOx3  HEENT: oral cavity no lesion  CV: regular rate   Lungs: on nasal cannula, CTAB, No distress  Abd: BS+, soft, nontender, no rebounding pain  Ext: no edema  Neuro: No focal deficits  Skin: Intact  IV: no phlebitis    Labs:                        17.9   11.26 )-----------( 314      ( 11 Mar 2023 06:20 )             53.8     03-11    140  |  105  |  11  ----------------------------<  87  3.5   |  22  |  0.91    Ca    8.5      11 Mar 2023 06:17  Mg     2.2     03-11    TPro  6.8  /  Alb  3.8  /  TBili  0.4  /  DBili  x   /  AST  24  /  ALT  41  /  AlkPhos  62  03-10      WBC Trend:  WBC Count: 11.26 (03-11-23 @ 06:20)  WBC Count: 8.42 (03-10-23 @ 11:43)      Auto Neutrophil #: 6.77 K/uL (03-10-23 @ 11:43)      Creatine Trend:  Creatinine, Serum: 0.91 (03-11)  Creatinine, Serum: 1.06 (03-10)      Liver Biochemical Testing Trend:  Alanine Aminotransferase (ALT/SGPT): 41 (03-10)  Aspartate Aminotransferase (AST/SGOT): 24 (03-10-23 @ 11:43)  Bilirubin Total, Serum: 0.4 (03-10)  Trend LDH  Auto Eosinophil %: 0.1 % (03-10-23 @ 11:43)  MICROBIOLOGY:  Rapid RVP Result: NotDetec (03-10 @ 11:42)  C-Reactive Protein, Serum: 17 (03-11)  Troponin T, High Sensitivity Result: 12 (03-10)  Troponin T, High Sensitivity Result: 19 (03-10)    Blood Gas Arterial, Lactate: 1.0 (03-10 @ 23:06)  Blood Gas Venous - Lactate: 1.5 (03-10 @ 11:41)      CSF:      RADIOLOGY:  Personally reviewed:   CT Angio Chest PE Protocol w/ IV Cont (03.10.23 @ 15:40)   IMPRESSION:  No pulmonary embolism.  Pulmonary edema with trace pleural effusions in thesetting of   cardiomegaly.    Xray Chest 1 View- PORTABLE-Urgent (03.10.23 @ 12:41)   IMPRESSION:  Bilateral hazy opacifications, which may represent pulmonary edema or   pneumonia.

## 2024-10-07 ENCOUNTER — APPOINTMENT (OUTPATIENT)
Dept: HEART FAILURE | Facility: CLINIC | Age: 39
End: 2024-10-07

## 2024-11-20 ENCOUNTER — APPOINTMENT (OUTPATIENT)
Dept: HEART FAILURE | Facility: CLINIC | Age: 39
End: 2024-11-20

## 2024-12-03 ENCOUNTER — APPOINTMENT (OUTPATIENT)
Dept: CARDIOLOGY | Facility: HOSPITAL | Age: 39
End: 2024-12-03

## 2025-03-03 ENCOUNTER — INPATIENT (INPATIENT)
Facility: HOSPITAL | Age: 40
LOS: 2 days | Discharge: HOME | DRG: 291 | End: 2025-03-06
Attending: STUDENT IN AN ORGANIZED HEALTH CARE EDUCATION/TRAINING PROGRAM | Admitting: INTERNAL MEDICINE
Payer: MEDICAID

## 2025-03-03 VITALS
TEMPERATURE: 99 F | OXYGEN SATURATION: 82 % | RESPIRATION RATE: 20 BRPM | HEART RATE: 101 BPM | SYSTOLIC BLOOD PRESSURE: 145 MMHG | DIASTOLIC BLOOD PRESSURE: 89 MMHG

## 2025-03-03 DIAGNOSIS — R06.02 SHORTNESS OF BREATH: ICD-10-CM

## 2025-03-03 LAB
ALBUMIN SERPL ELPH-MCNC: 4 G/DL — SIGNIFICANT CHANGE UP (ref 3.3–5)
ALP SERPL-CCNC: 75 U/L — SIGNIFICANT CHANGE UP (ref 40–120)
ALT FLD-CCNC: 33 U/L — SIGNIFICANT CHANGE UP (ref 10–45)
ANION GAP SERPL CALC-SCNC: 8 MMOL/L — SIGNIFICANT CHANGE UP (ref 5–17)
APTT BLD: 31.5 SEC — SIGNIFICANT CHANGE UP (ref 24.5–35.6)
AST SERPL-CCNC: 20 U/L — SIGNIFICANT CHANGE UP (ref 10–40)
BASOPHILS # BLD AUTO: 0.03 K/UL — SIGNIFICANT CHANGE UP (ref 0–0.2)
BASOPHILS NFR BLD AUTO: 0.4 % — SIGNIFICANT CHANGE UP (ref 0–2)
BILIRUB SERPL-MCNC: 0.3 MG/DL — SIGNIFICANT CHANGE UP (ref 0.2–1.2)
BUN SERPL-MCNC: 14 MG/DL — SIGNIFICANT CHANGE UP (ref 7–23)
CALCIUM SERPL-MCNC: 8.8 MG/DL — SIGNIFICANT CHANGE UP (ref 8.4–10.5)
CHLORIDE SERPL-SCNC: 105 MMOL/L — SIGNIFICANT CHANGE UP (ref 96–108)
CO2 SERPL-SCNC: 24 MMOL/L — SIGNIFICANT CHANGE UP (ref 22–31)
CREAT SERPL-MCNC: 1.1 MG/DL — SIGNIFICANT CHANGE UP (ref 0.5–1.3)
EGFR: 87 ML/MIN/1.73M2 — SIGNIFICANT CHANGE UP
EGFR: 87 ML/MIN/1.73M2 — SIGNIFICANT CHANGE UP
EOSINOPHIL # BLD AUTO: 0.06 K/UL — SIGNIFICANT CHANGE UP (ref 0–0.5)
EOSINOPHIL NFR BLD AUTO: 0.8 % — SIGNIFICANT CHANGE UP (ref 0–6)
FLUAV AG NPH QL: SIGNIFICANT CHANGE UP
FLUBV AG NPH QL: SIGNIFICANT CHANGE UP
GAS PNL BLDA: SIGNIFICANT CHANGE UP
GAS PNL BLDV: SIGNIFICANT CHANGE UP
GLUCOSE SERPL-MCNC: 132 MG/DL — HIGH (ref 70–99)
HCT VFR BLD CALC: 48.4 % — SIGNIFICANT CHANGE UP (ref 39–50)
HGB BLD-MCNC: 15.8 G/DL — SIGNIFICANT CHANGE UP (ref 13–17)
IMM GRANULOCYTES NFR BLD AUTO: 0.4 % — SIGNIFICANT CHANGE UP (ref 0–0.9)
INR BLD: 0.98 RATIO — SIGNIFICANT CHANGE UP (ref 0.85–1.16)
LYMPHOCYTES # BLD AUTO: 1.31 K/UL — SIGNIFICANT CHANGE UP (ref 1–3.3)
LYMPHOCYTES # BLD AUTO: 16.8 % — SIGNIFICANT CHANGE UP (ref 13–44)
MAGNESIUM SERPL-MCNC: 2.2 MG/DL — SIGNIFICANT CHANGE UP (ref 1.6–2.6)
MCHC RBC-ENTMCNC: 28.9 PG — SIGNIFICANT CHANGE UP (ref 27–34)
MCHC RBC-ENTMCNC: 32.6 G/DL — SIGNIFICANT CHANGE UP (ref 32–36)
MCV RBC AUTO: 88.6 FL — SIGNIFICANT CHANGE UP (ref 80–100)
MONOCYTES # BLD AUTO: 0.49 K/UL — SIGNIFICANT CHANGE UP (ref 0–0.9)
MONOCYTES NFR BLD AUTO: 6.3 % — SIGNIFICANT CHANGE UP (ref 2–14)
NEUTROPHILS # BLD AUTO: 5.86 K/UL — SIGNIFICANT CHANGE UP (ref 1.8–7.4)
NEUTROPHILS NFR BLD AUTO: 75.3 % — SIGNIFICANT CHANGE UP (ref 43–77)
NRBC BLD AUTO-RTO: 0 /100 WBCS — SIGNIFICANT CHANGE UP (ref 0–0)
NT-PROBNP SERPL-SCNC: 1008 PG/ML — HIGH (ref 0–300)
PLATELET # BLD AUTO: 313 K/UL — SIGNIFICANT CHANGE UP (ref 150–400)
POTASSIUM SERPL-MCNC: 3.8 MMOL/L — SIGNIFICANT CHANGE UP (ref 3.5–5.3)
POTASSIUM SERPL-SCNC: 3.8 MMOL/L — SIGNIFICANT CHANGE UP (ref 3.5–5.3)
PROCALCITONIN SERPL-MCNC: 0.07 NG/ML — SIGNIFICANT CHANGE UP (ref 0.02–0.1)
PROT SERPL-MCNC: 7.3 G/DL — SIGNIFICANT CHANGE UP (ref 6–8.3)
PROTHROM AB SERPL-ACNC: 11.2 SEC — SIGNIFICANT CHANGE UP (ref 9.9–13.4)
RBC # BLD: 5.46 M/UL — SIGNIFICANT CHANGE UP (ref 4.2–5.8)
RBC # FLD: 12.5 % — SIGNIFICANT CHANGE UP (ref 10.3–14.5)
RSV RNA NPH QL NAA+NON-PROBE: SIGNIFICANT CHANGE UP
SARS-COV-2 RNA SPEC QL NAA+PROBE: SIGNIFICANT CHANGE UP
SODIUM SERPL-SCNC: 137 MMOL/L — SIGNIFICANT CHANGE UP (ref 135–145)
TROPONIN T, HIGH SENSITIVITY RESULT: 19 NG/L — SIGNIFICANT CHANGE UP (ref 0–51)
TROPONIN T, HIGH SENSITIVITY RESULT: 19 NG/L — SIGNIFICANT CHANGE UP (ref 0–51)
WBC # BLD: 7.78 K/UL — SIGNIFICANT CHANGE UP (ref 3.8–10.5)
WBC # FLD AUTO: 7.78 K/UL — SIGNIFICANT CHANGE UP (ref 3.8–10.5)

## 2025-03-03 PROCEDURE — 99291 CRITICAL CARE FIRST HOUR: CPT | Mod: GC

## 2025-03-03 PROCEDURE — 71045 X-RAY EXAM CHEST 1 VIEW: CPT | Mod: 26

## 2025-03-03 PROCEDURE — 99291 CRITICAL CARE FIRST HOUR: CPT

## 2025-03-03 PROCEDURE — 93308 TTE F-UP OR LMTD: CPT | Mod: 26,GC

## 2025-03-03 PROCEDURE — 76604 US EXAM CHEST: CPT | Mod: 26,GC

## 2025-03-03 RX ORDER — IPRATROPIUM BROMIDE AND ALBUTEROL SULFATE .5; 2.5 MG/3ML; MG/3ML
3 SOLUTION RESPIRATORY (INHALATION) EVERY 6 HOURS
Refills: 0 | Status: DISCONTINUED | OUTPATIENT
Start: 2025-03-03 | End: 2025-03-06

## 2025-03-03 RX ORDER — PIPERACILLIN-TAZO-DEXTROSE,ISO 3.375G/5
3.38 IV SOLUTION, PIGGYBACK PREMIX FROZEN(ML) INTRAVENOUS ONCE
Refills: 0 | Status: COMPLETED | OUTPATIENT
Start: 2025-03-04 | End: 2025-03-04

## 2025-03-03 RX ORDER — PIPERACILLIN-TAZO-DEXTROSE,ISO 3.375G/5
3.38 IV SOLUTION, PIGGYBACK PREMIX FROZEN(ML) INTRAVENOUS EVERY 8 HOURS
Refills: 0 | Status: DISCONTINUED | OUTPATIENT
Start: 2025-03-04 | End: 2025-03-04

## 2025-03-03 RX ORDER — FUROSEMIDE 10 MG/ML
40 INJECTION INTRAMUSCULAR; INTRAVENOUS ONCE
Refills: 0 | Status: COMPLETED | OUTPATIENT
Start: 2025-03-03 | End: 2025-03-03

## 2025-03-03 RX ORDER — B1/B2/B3/B5/B6/B12/VIT C/FOLIC 500-0.5 MG
1 TABLET ORAL DAILY
Refills: 0 | Status: DISCONTINUED | OUTPATIENT
Start: 2025-03-03 | End: 2025-03-05

## 2025-03-03 RX ORDER — SPIRONOLACTONE 25 MG
25 TABLET ORAL DAILY
Refills: 0 | Status: DISCONTINUED | OUTPATIENT
Start: 2025-03-03 | End: 2025-03-05

## 2025-03-03 RX ORDER — FOLIC ACID 1 MG/1
1 TABLET ORAL DAILY
Refills: 0 | Status: DISCONTINUED | OUTPATIENT
Start: 2025-03-03 | End: 2025-03-05

## 2025-03-03 RX ORDER — ENOXAPARIN SODIUM 100 MG/ML
40 INJECTION SUBCUTANEOUS EVERY 24 HOURS
Refills: 0 | Status: DISCONTINUED | OUTPATIENT
Start: 2025-03-03 | End: 2025-03-06

## 2025-03-03 RX ORDER — LORAZEPAM 4 MG/ML
2 VIAL (ML) INJECTION
Refills: 0 | Status: DISCONTINUED | OUTPATIENT
Start: 2025-03-03 | End: 2025-03-05

## 2025-03-03 RX ORDER — METOPROLOL SUCCINATE 50 MG/1
50 TABLET, EXTENDED RELEASE ORAL DAILY
Refills: 0 | Status: DISCONTINUED | OUTPATIENT
Start: 2025-03-03 | End: 2025-03-05

## 2025-03-03 RX ORDER — PIPERACILLIN-TAZO-DEXTROSE,ISO 3.375G/5
3.38 IV SOLUTION, PIGGYBACK PREMIX FROZEN(ML) INTRAVENOUS ONCE
Refills: 0 | Status: COMPLETED | OUTPATIENT
Start: 2025-03-03 | End: 2025-03-03

## 2025-03-03 RX ORDER — LORAZEPAM 4 MG/ML
4 VIAL (ML) INJECTION
Refills: 0 | Status: DISCONTINUED | OUTPATIENT
Start: 2025-03-03 | End: 2025-03-05

## 2025-03-03 RX ORDER — ACETAMINOPHEN 500 MG/5ML
1000 LIQUID (ML) ORAL ONCE
Refills: 0 | Status: COMPLETED | OUTPATIENT
Start: 2025-03-03 | End: 2025-03-03

## 2025-03-03 RX ORDER — LOSARTAN POTASSIUM 100 MG/1
25 TABLET, FILM COATED ORAL
Refills: 0 | Status: DISCONTINUED | OUTPATIENT
Start: 2025-03-03 | End: 2025-03-05

## 2025-03-03 RX ADMIN — Medication 1000 MILLIGRAM(S): at 21:45

## 2025-03-03 RX ADMIN — Medication 200 GRAM(S): at 21:35

## 2025-03-03 RX ADMIN — FUROSEMIDE 40 MILLIGRAM(S): 10 INJECTION INTRAMUSCULAR; INTRAVENOUS at 15:19

## 2025-03-03 RX ADMIN — Medication 400 MILLIGRAM(S): at 21:10

## 2025-03-03 RX ADMIN — ENOXAPARIN SODIUM 40 MILLIGRAM(S): 100 INJECTION SUBCUTANEOUS at 15:19

## 2025-03-03 RX ADMIN — FUROSEMIDE 40 MILLIGRAM(S): 10 INJECTION INTRAMUSCULAR; INTRAVENOUS at 21:34

## 2025-03-03 RX ADMIN — IPRATROPIUM BROMIDE AND ALBUTEROL SULFATE 3 MILLILITER(S): .5; 2.5 SOLUTION RESPIRATORY (INHALATION) at 23:30

## 2025-03-03 RX ADMIN — Medication 400 MILLIGRAM(S): at 11:28

## 2025-03-03 RX ADMIN — FUROSEMIDE 40 MILLIGRAM(S): 10 INJECTION INTRAMUSCULAR; INTRAVENOUS at 06:27

## 2025-03-03 RX ADMIN — Medication 1 APPLICATION(S): at 15:00

## 2025-03-03 NOTE — ED PROVIDER NOTE - PROGRESS NOTE DETAILS
Daron Shaw MD, PGY3  Chest x-ray showing bilateral patchy airspace opacities and cardiomegaly compatible with pulmonary vascular congestion.  Patient hemodynamically stable and comfortable on BiPAP.  Discussed with Dr. Holder for unattached admission.  Requesting ICU consult given new BiPAP.  ICU to evaluate. Daron Shaw MD, PGY3  Patient evaluated by MICU, requesting cardiology consultation for evaluation of need for CICU.  Discussed with cardiology fellow, patient follows with Dr. Pereira so was instructed to reach out to him.  Dr. Pereira evaluated patient.  Recommended admission to floors with unattached hospitalist with him following his cardiologist.  Patient maintaining on BiPAP at this time. Daron Shaw MD, PGY3  Patient removed BiPAP mask, desaturation to low 80s, very tachypneic.  Discussed with MICU who will admit to their service given concern for decompensation.

## 2025-03-03 NOTE — H&P ADULT - NSHPREVIEWOFSYSTEMS_GEN_ALL_CORE
REVIEW OF SYSTEMS      General: - fevers/chills    Skin/Breast:  	  Ophthalmologic:  	  ENMT:	    Respiratory and Thorax:  	  Cardiovascular:	    Gastrointestinal: -abdominal pain, -N/V, - diarrhea	    Genitourinary:	    Musculoskeletal:	    Neurological:	    Psychiatric:	    Hematology/Lymphatics:	    Endocrine:	    Allergic/Immunologic: REVIEW OF SYSTEMS      General: - fevers/chills    Skin/Breast: - rashes    ENMT: -runny nose	    Respiratory and Thorax: +dyspnea, +cough  	  Cardiovascular: +chest discomfort	    Gastrointestinal: -abdominal pain, -N/V, - diarrhea	    Genitourinary: - dysuria	    Neurological: -focal	    Psychiatric: -mood	    Endocrine: -hypoglycemia

## 2025-03-03 NOTE — H&P ADULT - NSHPLABSRESULTS_GEN_ALL_CORE
Labs:                        15.8   7.78  )-----------( [phone]      ( 03 Mar 2025 06:35 )             48.4     03-03    137  |  105  |  14  ----------------------------<  132[H]  3.8   |  24  |  1.10    Ca    8.8      03 Mar 2025 06:35  Mg     2.2     03-03    TPro  7.3  /  Alb  4.0  /  TBili  0.3  /  DBili  x   /  AST  20  /  ALT  33  /  AlkPhos  75  03-03    PT/INR - ( 03 Mar 2025 06:35 )   PT: 11.2 sec;   INR: 0.98 ratio         PTT - ( 03 Mar 2025 06:35 )  PTT:31.5 sec

## 2025-03-03 NOTE — ED PROVIDER NOTE - ATTENDING CONTRIBUTION TO CARE
Yadira Malin DO EM Attending  39 M with h/o NICM ( EF 35% in March 2023), HTN, JEFF, noncompliant on medications presenting with SOB. Patient states shortness of breath for the past few days acutely worsening overnight.  States he has been intermittently compliant with his Lasix which he supposed to take every day.  Denies any fevers, chills, abdominal pain, nausea vomiting diarrhea.  Not on home oxygen.  Denies any lower leg swelling.  States he has not followed up with his doctors recently for this.  Patient was last evaluated in the hospital in 2023.  States he intermittently takes Lasix because he does not have enough of the prescription.    PHYSICAL EXAM:  CONSTITUTIONAL: Tachypneic appearing, awake, alert, oriented to person, place, time/situation   HEAD: Atraumatic  EYES: Clear bilaterally, pupils equal, round and reactive to light.  ENMT: Airway patent, Nasal mucosa clear. Mouth with normal mucosa. Uvula is midline.   CARDIAC: Normal rate, regular rhythm. +S1/S2. No murmurs, rubs or gallops.  RESPIRATORY: Breathing tachypneic with crackles at bases   ABDOMEN:  Soft, nontender, nondistended. No rebound tenderness or guarding.  NEUROLOGICAL: Alert and oriented, no focal deficits, no motor or sensory deficits.   MSK: No clubbing, cyanosis, or edema. Full range of motion of all extremities. No tenderness to palpation. No midline or paraspinal tenderness. No spinal step-offs.  SKIN: Skin warm and dry. No evidence of rashes or lesions.    Patient with O2 sat of 82% on room air, tachypneic.  Afebrile normotensive.  Plan for basic labs, VBG, troponin, proBNP, chest x-ray.  Bedside POCUS shows diffuse B-lines bilaterally.  Will give 40 mg of Lasix.  Differential diagnose includes not limited to CHF exacerbation versus pneumonia versus metabolic derangement.  Final dispo pending labs imaging close reassessment however anticipate admission for CHF

## 2025-03-03 NOTE — ED PROVIDER NOTE - HIV OFFER
Previously Negative (within the last year) Unna Boot Text: Unna boot was applied to minimize swelling and chance of dehiscence.  The patient was instructed to remove the Unna boot at home after 1 week.

## 2025-03-03 NOTE — CHART NOTE - NSCHARTNOTEFT_GEN_A_CORE
: Deacon Bentley    INDICATION: respiratory failure    PROCEDURE:  [x ] LIMITED ECHO  [x ] LIMITED CHEST  [ ] LIMITED RETROPERITONEAL  [ ] LIMITED ABDOMINAL  [ ] LIMITED DVT  [ ] NEEDLE GUIDANCE VASCULAR  [ ] NEEDLE GUIDANCE THORACENTESIS  [ ] NEEDLE GUIDANCE PARACENTESIS  [ ] NEEDLE GUIDANCE PERICARDIOCENTESIS  [ ] OTHER    FINDINGS:  A-line pattern predominates w/ scattered B-lines anteriorly  LV systolic function severely decreased; global hypokinesis, LV dilated; LA dilated  RV systolic function appears grossly normal   LVOT VTI 9.8  IVC 1.9cm      INTERPRETATION:  A-line pattern w/ scattered B-lines c/w pulmonary edema  LV systolic function severely reduced : Deacon Bentley    INDICATION: respiratory failure    PROCEDURE:  [x ] LIMITED ECHO  [x ] LIMITED CHEST  [ ] LIMITED RETROPERITONEAL  [ ] LIMITED ABDOMINAL  [ ] LIMITED DVT  [ ] NEEDLE GUIDANCE VASCULAR  [ ] NEEDLE GUIDANCE THORACENTESIS  [ ] NEEDLE GUIDANCE PARACENTESIS  [ ] NEEDLE GUIDANCE PERICARDIOCENTESIS  [ ] OTHER    FINDINGS:  A-line pattern predominates w/ scattered B-lines anteriorly  LV systolic function severely decreased; global hypokinesis, LV dilated; LA dilated  RV systolic function appears grossly normal   LVOT VTI 9.8  IVC 1.9cm      INTERPRETATION:  A-line pattern w/ scattered B-lines c/w pulmonary edema  LV systolic function severely reduced    I have assisted and supervised entire procedure.     Mathieu Bentley MD

## 2025-03-03 NOTE — ED ADULT NURSE NOTE - OBJECTIVE STATEMENT
41 y/o M  with Hx HTN, HLD, nonischemic cardiomyopathy and systolic CHF presenting to ED   with c/o SOB since last night. Upon assessment pt is  A&Ox3 speaking coherently in full sentences. Pt  was 83% on room air in triage placed on 6L improved to 90%, respiratory contacted. pt Denies HA, dizziness, blurry vision.  Denies  CP, palpitations. EKG done. On CM, NSR. Denies abd pain, N/V/D/C. Abd soft NT/ND. Denies urinary symptoms. Denies fever, chills. No sick contacts. Skin intact, warm, dry, normal for race.

## 2025-03-03 NOTE — PATIENT PROFILE ADULT - FALL HARM RISK - HARM RISK INTERVENTIONS
Assistance with ambulation/Assistance OOB with selected safe patient handling equipment/Communicate Risk of Fall with Harm to all staff/Monitor for mental status changes/Monitor gait and stability/Reinforce activity limits and safety measures with patient and family/Tailored Fall Risk Interventions/Toileting schedule using arm’s reach rule for commode and bathroom/Use of alarms - bed, chair and/or voice tab/Visual Cue: Yellow wristband and red socks/Bed in lowest position, wheels locked, appropriate side rails in place/Call bell, personal items and telephone in reach/Instruct patient to call for assistance before getting out of bed or chair/Non-slip footwear when patient is out of bed/[place] to call system/Physically safe environment - no spills, clutter or unnecessary equipment/Purposeful Proactive Rounding/Room/bathroom lighting operational, light cord in reach

## 2025-03-03 NOTE — H&P ADULT - HISTORY OF PRESENT ILLNESS
Lori Torres is a 40-year-old male with a past medical history of NICM (EF 21% 07/24), HTN, JEFF presenting with SOB.     Per EMR, has intermittent Lasix use and poor healthcare follow up.     ED Course: On presentation, 82% on RA and tachypneic to 35, hypertensive to 159/100. Started on BiPAP for increased WOB and hypoxia. Given Lasix 40 mg IV x1. VBG with pH 7.31. RVP neg. CXR with pulmonary edema vs pneumonia. Lori Torres is a 40-year-old male with a past medical history of NICM (EF 21% 07/24), HTN, JEFF presenting with a one day history of SOB and productive cough.   Last night, started having increasing SOB, a productive cough, and chest discomfort. It was getting worse, prompting him to the come to the ED. Also noticed diarrhea x1 day and possibly a fever at home. No sick contacts. Per EMR, has intermittent diuretic use and poor healthcare follow up. Discussing with patient, he was having difficulties taking all of his medications due to insurance coverage.     ED Course: On presentation, 82% on RA and tachypneic to 35, hypertensive to 159/100. Started on BiPAP for increased WOB and hypoxia. Given Lasix 40 mg IV x1. VBG with pH 7.31. RVP neg. CXR with pulmonary edema vs pneumonia.

## 2025-03-03 NOTE — H&P ADULT - ATTENDING COMMENTS
1. Acute hypoxemic respiratory failure due to decompensated HFrEF. Pt non compliant with medications. Ran out of meds 2 weeks ago. Started coughing  with ? fever last night. Cough non productive. On BiPAP  15/5. Still Tachypneic 30-40 BPM.. Pt received 40 mg Lasix x 1. Increase BiPAP to 15/10.     2..Cardiac. Decompensated HFrEF. Continue aggressive diuresis with Lasix and NIPPV. Once out of pulmonary edema will add afterload treatment.                        Non ischemic cardiomyopathy. ? from ETOH use    3.ETOH use. Pt says he last  drank vodka  6 days ago. He does not have tremors. Follow for possible signs of withdrawal.   4.No signs of infection at this point. RVP negative.  5. DVT prophylaxis Lovenox  6. GOC: Full code

## 2025-03-03 NOTE — ED PROCEDURE NOTE - ATTENDING CONTRIBUTION TO CARE
Yadira Malin DO. EM Attending Physician.      Attending Contribution to Care:  I performed a history and physical exam of the patient and discussed their management with the resident, student, and/or advanced care provider. I reviewed the resident, student, and/or advanced care provider's note and agree with the documented findings and plan of care. I was present and available for all procedures.

## 2025-03-03 NOTE — ED PROVIDER NOTE - CLINICAL SUMMARY MEDICAL DECISION MAKING FREE TEXT BOX
39 M with h/o NICM ( EF 35% in March 2023), HTN, JEFF, noncompliant on medications presenting with SOB. Patient states shortness of breath for the past few days acutely worsening overnight.  States he has been intermittently compliant with his Lasix which he supposed to take every day.  Denies any fevers, chills, abdominal pain, nausea vomiting diarrhea.  Not on home oxygen.  Denies any lower leg swelling.  States he has not followed up with his doctors recently for this.  Patient was last evaluated in the hospital in 2023.  States he intermittently takes Lasix because he does not have enough of the prescription.    Timothy Paula DO (PGY3)   Physical Exam:    Gen: NAD, AOx3  Head: NCAT  HEENT: EOMI, PEERLA  Lung: +tachypnea with coarse breath sounds   CV: RRR, no murmurs, rubs or gallops  Abd: soft, NT, ND, no guarding, no rigidity, no rebound tenderness, no CVA tenderness   MSK: no visible deformities, ROM normal in UE/LE, no back pain  Neuro: No focal sensory or motor deficits. Sensation intact to light touch all extremities.  Skin: Warm, well perfused, no rash, no leg swelling  Psych: normal affect, calm    Patient with O2 sat of 82% on room air, tachypneic.  Afebrile normotensive.  Plan for basic labs, VBG, troponin, proBNP, chest x-ray.  Bedside POCUS shows diffuse B-lines bilaterally.  Will give 40 mg of Lasix.  Differential diagnose includes not limited to CHF exacerbation versus pneumonia versus metabolic derangement.  Final dispo pending labs imaging close reassessment however anticipate admission for CHF

## 2025-03-03 NOTE — H&P ADULT - ASSESSMENT
Lori Torres is a 40-year-old male with a past medical history of NICM (EF 21% 07/24), HTN, JEFF presenting with SOB.     === NEUROLOGY ===  #     === PULMONARY ===  # AHRF    - No O2 at baseline  - on new bipap,     === CARDIOVASCULAR ===  # NICM   - TTE from 07/24: Left ventricular cavity is mildly dilated. EF 21%.  No regional wall motion abnormalities, but multiple segmental abnormalities. Mild (grade 1) left ventricular diastolic dysfunction. LA mildly dilated.    === RENAL ===  #    === GASTROINTESTINAL ===  #    === INFECTIOUS DISEASE ===  #    === ENDOCRINE ===  #    === HEME ===  #    === FLUIDS/ELECTROLYTES/NUTRITION ===  # Diet:  # IVF:  # Monitor, Replete to K>4 and Mg>2  # Transfuse for Hgb <7, Plt<10      === PROPHYLAXIS ===  # DVT:  # GI:    DISPO:  CODE STATUS: Lori Torres is a 40-year-old male with a past medical history of NICM (EF 21% 07/24), HTN, JEFF presenting with SOB.     === NEUROLOGY ===  #     === PULMONARY ===  # AHRF    - No O2 at baseline  - on new bipap,     === CARDIOVASCULAR ===  # NICM   - per HF in EMR, etiology could be 2/2 EtOH vs genetic  - TTE from 07/24: Left ventricular cavity is mildly dilated. EF 21%.  No regional wall motion abnormalities, but multiple segmental abnormalities. Mild (grade 1) left ventricular diastolic dysfunction. LA mildly dilated.    === RENAL ===  #    === GASTROINTESTINAL ===  #    === INFECTIOUS DISEASE ===  #    === ENDOCRINE ===  #    === HEME ===  #    === FLUIDS/ELECTROLYTES/NUTRITION ===  # Diet:  # IVF:  # Monitor, Replete to K>4 and Mg>2  # Transfuse for Hgb <7, Plt<10      === PROPHYLAXIS ===  # DVT:  # GI:    DISPO:  CODE STATUS: Lori Torres is a 40-year-old male with a past medical history of NICM (EF 21% 07/24), HTN, JEFF presenting with a one day history of SOB and productive cough.     === NEUROLOGY ===  # No acute issues      === PULMONARY ===  # AHRF  - Etiology likely 2/2 CHF in the setting of poor medication access, with PNA also on differential  - No O2 at baseline, on new bipap   - CXR with bilateral patchy airspace opacities      === CARDIOVASCULAR ===  #ADHF  - BNP elevated to 1000 from previous around 400-500  - BRITT on exam and CXR with bilateral airspace opacities  - obtain repeat TTE    # NICM   - per HF in EMR, etiology could be 2/2 EtOH vs genetic  - TTE from 07/24: Left ventricular cavity is mildly dilated. EF 21%.  No regional wall motion abnormalities, but multiple segmental abnormalities. Mild (grade 1) left ventricular diastolic dysfunction. LA mildly dilated.      === RENAL ===  # No acute issues      === GASTROINTESTINAL ===  # Diarrhea  - a one day history of diarrhea  - CTM      === INFECTIOUS DISEASE ===  # SIRS  - presenting tachypneic and hypoxic, meeting SIRS criteria  - CXR with bilateral airspace opacities, infectious vs pulmonary edema. Would favor cardiac source of pulmonary edema      === ENDOCRINE ===  # No acute issues      === HEME ===  # No acute issues      === FLUIDS/ELECTROLYTES/NUTRITION ===  # Diet: DASH/TLC once off of BiPAP  # Monitor, Replete to K>4 and Mg>2  # Transfuse for Hgb <7, Plt<10      === PROPHYLAXIS ===  # DVT: Lovenox    DISPO:  CODE STATUS: Lori Torres is a 40-year-old male with a past medical history of NICM (EF 21% 07/24), HTN, JEFF presenting with a one day history of SOB and productive cough.     === NEUROLOGY ===  # No acute issues      === PULMONARY ===  # AHRF  - Etiology likely 2/2 CHF in the setting of poor medication access, with PNA also on differential  - No O2 at baseline, on new bipap   - CXR with bilateral patchy airspace opacities  - urine strep/legionella  - procal      === CARDIOVASCULAR ===  #ADHF  - BNP elevated to 1000 from previous around 400-500  - BRITT on exam and CXR with bilateral airspace opacities  - obtain repeat TTE    # NICM   - per HF in EMR, etiology could be 2/2 EtOH vs genetic  - TTE from 07/24: Left ventricular cavity is mildly dilated. EF 21%.  No regional wall motion abnormalities, but multiple segmental abnormalities. Mild (grade 1) left ventricular diastolic dysfunction. LA mildly dilated.      === RENAL ===  # No acute issues      === GASTROINTESTINAL ===  # Diarrhea  - a one day history of diarrhea  - CTM      === INFECTIOUS DISEASE ===  # SIRS  - presenting tachypneic and hypoxic, meeting SIRS criteria  - CXR with bilateral airspace opacities, infectious vs pulmonary edema. Would favor cardiac source of pulmonary edema  - blood cultures x2      === ENDOCRINE ===  # No acute issues      === HEME ===  # No acute issues      === FLUIDS/ELECTROLYTES/NUTRITION ===  # Diet: DASH/TLC once off of BiPAP  # Monitor, Replete to K>4 and Mg>2  # Transfuse for Hgb <7, Plt<10      === PROPHYLAXIS ===  # DVT: Lovenox    DISPO:  CODE STATUS: Lori Torres is a 40-year-old male with a past medical history of NICM (EF 21% 07/24), HTN, JEFF presenting with a one day history of SOB and productive cough.     === NEUROLOGY ===  # No acute issues      === PULMONARY ===  # AHRF  - Etiology likely 2/2 CHF in the setting of poor medication access, with PNA also on differential  - No O2 at baseline, on new bipap   - CXR with bilateral patchy airspace opacities  - urine strep/legionella  - procal      === CARDIOVASCULAR ===  #ADHF  - BNP elevated to 1000 from previous around 400-500  - BRITT on exam and CXR with bilateral airspace opacities  - obtain repeat TTE    # NICM   - per HF in EMR, etiology could be 2/2 EtOH vs genetic  - TTE from 07/24: Left ventricular cavity is mildly dilated. EF 21%.  No regional wall motion abnormalities, but multiple segmental abnormalities. Mild (grade 1) left ventricular diastolic dysfunction. LA mildly dilated.      === RENAL ===  # No acute issues      === GASTROINTESTINAL ===  # Diarrhea  - a one day history of diarrhea  - CTM      === INFECTIOUS DISEASE ===  # SIRS  - presenting tachypneic and hypoxic, meeting SIRS criteria  - CXR with bilateral airspace opacities, infectious vs pulmonary edema. Would favor cardiac source of pulmonary edema  - blood cultures x2      === ENDOCRINE ===  # No acute issues      === HEME ===  # No acute issues      === FLUIDS/ELECTROLYTES/NUTRITION ===  # Diet: DASH/TLC once off of BiPAP  # Monitor, Replete to K>4 and Mg>2  # Transfuse for Hgb <7, Plt<10      === PROPHYLAXIS ===  # DVT: Lovenox   Lori Torres is a 40-year-old male with a past medical history of NICM (EF 21% 07/24), HTN, JEFF presenting with a one day history of SOB and productive cough.     === NEUROLOGY ===  # No acute issues      === PULMONARY ===  # AHRF  - Etiology likely 2/2 CHF in the setting of poor medication access, with PNA also on differential  - No O2 at baseline, on new bipap   - CXR with bilateral patchy airspace opacities  - urine strep/legionella  - procal      === CARDIOVASCULAR ===  #ADHF  - BNP elevated to 1000 from previous around 400-500  - BRITT on exam and CXR with bilateral airspace opacities    # NICM   - per HF in EMR, etiology could be 2/2 EtOH vs genetic  - TTE from 07/24: Left ventricular cavity is mildly dilated. EF 21%.  No regional wall motion abnormalities, but multiple segmental abnormalities. Mild (grade 1) left ventricular diastolic dysfunction. LA mildly dilated.      === RENAL ===  # No acute issues      === GASTROINTESTINAL ===  # Diarrhea  - a one day history of diarrhea  - CTM      === INFECTIOUS DISEASE ===  # SIRS  - presenting tachypneic and hypoxic, meeting SIRS criteria  - CXR with bilateral airspace opacities, infectious vs pulmonary edema. Would favor cardiac source of pulmonary edema  - blood cultures x2      === ENDOCRINE ===  # No acute issues      === HEME ===  # No acute issues      === FLUIDS/ELECTROLYTES/NUTRITION ===  # Diet: DASH/TLC once off of BiPAP  # Monitor, Replete to K>4 and Mg>2  # Transfuse for Hgb <7, Plt<10      === PROPHYLAXIS ===  # DVT: Lovenox

## 2025-03-03 NOTE — H&P ADULT - NSHPPHYSICALEXAM_GEN_ALL_CORE
Vital Signs Last 24 Hrs  T(C): 36.2 (03 Mar 2025 13:15), Max: 37.1 (03 Mar 2025 06:11)  T(F): 97.2 (03 Mar 2025 13:15), Max: 98.7 (03 Mar 2025 06:11)  HR: 88 (03 Mar 2025 13:15) (83 - 101)  BP: 133/86 (03 Mar 2025 13:15) (132/82 - 159/100)  BP(mean): 104 (03 Mar 2025 13:15) (104 - 124)  RR: 32 (03 Mar 2025 13:15) (20 - 35)  SpO2: 90% (03 Mar 2025 13:15) (82% - 98%)    Parameters below as of 03 Mar 2025 13:15  Patient On (Oxygen Delivery Method): BiPAP/CPAP    O2 Concentration (%): 50    PHYSICAL EXAM:  GENERAL: NAD, well-groomed, well-developed  HEAD:  Atraumatic, Normocephalic  EYES: EOMI, conjunctiva and sclera clear  ENMT: No tonsillar erythema, exudates, or enlargement; Moist mucous membranes, Good dentition  NECK: Supple, No JVD  NERVOUS SYSTEM: AOX3, motor and sensation grossly intact in b/l UE and b/l LE  PSYCHIATRIC: Appropriate affect and mood  CHEST/LUNG: Clear to auscultation bilaterally; No rales, rhonchi, wheezing, or rubs  HEART: Regular rate and rhythm; No murmurs, rubs, or gallops. No LE edema  ABDOMEN: Soft, Nontender, Nondistended; Bowel sounds present  EXTREMITIES:  2+ Peripheral Pulses, No clubbing, cyanosis  SKIN: No rashes or lesions Vital Signs Last 24 Hrs  T(C): 36.2 (03 Mar 2025 13:15), Max: 37.1 (03 Mar 2025 06:11)  T(F): 97.2 (03 Mar 2025 13:15), Max: 98.7 (03 Mar 2025 06:11)  HR: 88 (03 Mar 2025 13:15) (83 - 101)  BP: 133/86 (03 Mar 2025 13:15) (132/82 - 159/100)  BP(mean): 104 (03 Mar 2025 13:15) (104 - 124)  RR: 32 (03 Mar 2025 13:15) (20 - 35)  SpO2: 90% (03 Mar 2025 13:15) (82% - 98%)    Parameters below as of 03 Mar 2025 13:15  Patient On (Oxygen Delivery Method): BiPAP/CPAP    O2 Concentration (%): 50    PHYSICAL EXAM:  GENERAL: NAD, well-groomed, well-developed  HEAD:  Atraumatic, Normocephalic  EYES: EOMI, conjunctiva and sclera clear  ENMT: Moist mucous membranes  NECK: Supple  NERVOUS SYSTEM: AOX3, motor and sensation grossly intact in b/l UE and b/l LE  PSYCHIATRIC: Appropriate affect and mood  CHEST/LUNG: on bipap, increased WOB, bilateral crackles in bilateral lung bases, no wheezes  HEART: Regular rate and rhythm; No murmurs, rubs, or gallops. Trace LE edema  ABDOMEN: Soft, Nontender, Nondistended;  EXTREMITIES: No clubbing, cyanosis  SKIN: No rashes or lesions

## 2025-03-04 LAB
ALBUMIN SERPL ELPH-MCNC: 3.3 G/DL — SIGNIFICANT CHANGE UP (ref 3.3–5)
ALBUMIN SERPL ELPH-MCNC: 3.8 G/DL — SIGNIFICANT CHANGE UP (ref 3.3–5)
ALP SERPL-CCNC: 54 U/L — SIGNIFICANT CHANGE UP (ref 40–120)
ALP SERPL-CCNC: 61 U/L — SIGNIFICANT CHANGE UP (ref 40–120)
ALT FLD-CCNC: 24 U/L — SIGNIFICANT CHANGE UP (ref 10–45)
ALT FLD-CCNC: 27 U/L — SIGNIFICANT CHANGE UP (ref 10–45)
ANION GAP SERPL CALC-SCNC: 12 MMOL/L — SIGNIFICANT CHANGE UP (ref 5–17)
ANION GAP SERPL CALC-SCNC: 14 MMOL/L — SIGNIFICANT CHANGE UP (ref 5–17)
APTT BLD: 29.7 SEC — SIGNIFICANT CHANGE UP (ref 24.5–35.6)
AST SERPL-CCNC: 16 U/L — SIGNIFICANT CHANGE UP (ref 10–40)
AST SERPL-CCNC: 20 U/L — SIGNIFICANT CHANGE UP (ref 10–40)
BASOPHILS # BLD AUTO: 0.04 K/UL — SIGNIFICANT CHANGE UP (ref 0–0.2)
BASOPHILS NFR BLD AUTO: 0.4 % — SIGNIFICANT CHANGE UP (ref 0–2)
BILIRUB SERPL-MCNC: 1 MG/DL — SIGNIFICANT CHANGE UP (ref 0.2–1.2)
BILIRUB SERPL-MCNC: 1 MG/DL — SIGNIFICANT CHANGE UP (ref 0.2–1.2)
BUN SERPL-MCNC: 14 MG/DL — SIGNIFICANT CHANGE UP (ref 7–23)
BUN SERPL-MCNC: 16 MG/DL — SIGNIFICANT CHANGE UP (ref 7–23)
CALCIUM SERPL-MCNC: 8.5 MG/DL — SIGNIFICANT CHANGE UP (ref 8.4–10.5)
CALCIUM SERPL-MCNC: 8.5 MG/DL — SIGNIFICANT CHANGE UP (ref 8.4–10.5)
CHLORIDE SERPL-SCNC: 106 MMOL/L — SIGNIFICANT CHANGE UP (ref 96–108)
CHLORIDE SERPL-SCNC: 106 MMOL/L — SIGNIFICANT CHANGE UP (ref 96–108)
CO2 SERPL-SCNC: 22 MMOL/L — SIGNIFICANT CHANGE UP (ref 22–31)
CO2 SERPL-SCNC: 23 MMOL/L — SIGNIFICANT CHANGE UP (ref 22–31)
CREAT SERPL-MCNC: 1.22 MG/DL — SIGNIFICANT CHANGE UP (ref 0.5–1.3)
CREAT SERPL-MCNC: 1.37 MG/DL — HIGH (ref 0.5–1.3)
EGFR: 67 ML/MIN/1.73M2 — SIGNIFICANT CHANGE UP
EGFR: 67 ML/MIN/1.73M2 — SIGNIFICANT CHANGE UP
EGFR: 77 ML/MIN/1.73M2 — SIGNIFICANT CHANGE UP
EGFR: 77 ML/MIN/1.73M2 — SIGNIFICANT CHANGE UP
EOSINOPHIL # BLD AUTO: 0.11 K/UL — SIGNIFICANT CHANGE UP (ref 0–0.5)
EOSINOPHIL NFR BLD AUTO: 1.1 % — SIGNIFICANT CHANGE UP (ref 0–6)
GAS PNL BLDA: SIGNIFICANT CHANGE UP
GLUCOSE SERPL-MCNC: 82 MG/DL — SIGNIFICANT CHANGE UP (ref 70–99)
GLUCOSE SERPL-MCNC: 87 MG/DL — SIGNIFICANT CHANGE UP (ref 70–99)
HCT VFR BLD CALC: 47.3 % — SIGNIFICANT CHANGE UP (ref 39–50)
HGB BLD-MCNC: 16 G/DL — SIGNIFICANT CHANGE UP (ref 13–17)
IMM GRANULOCYTES NFR BLD AUTO: 0.4 % — SIGNIFICANT CHANGE UP (ref 0–0.9)
INR BLD: 1.09 RATIO — SIGNIFICANT CHANGE UP (ref 0.85–1.16)
LEGIONELLA AG UR QL: NEGATIVE — SIGNIFICANT CHANGE UP
LYMPHOCYTES # BLD AUTO: 19.7 % — SIGNIFICANT CHANGE UP (ref 13–44)
LYMPHOCYTES # BLD AUTO: 2 K/UL — SIGNIFICANT CHANGE UP (ref 1–3.3)
MAGNESIUM SERPL-MCNC: 2 MG/DL — SIGNIFICANT CHANGE UP (ref 1.6–2.6)
MAGNESIUM SERPL-MCNC: 2.2 MG/DL — SIGNIFICANT CHANGE UP (ref 1.6–2.6)
MCHC RBC-ENTMCNC: 29.4 PG — SIGNIFICANT CHANGE UP (ref 27–34)
MCHC RBC-ENTMCNC: 33.8 G/DL — SIGNIFICANT CHANGE UP (ref 32–36)
MCV RBC AUTO: 86.9 FL — SIGNIFICANT CHANGE UP (ref 80–100)
MONOCYTES # BLD AUTO: 0.74 K/UL — SIGNIFICANT CHANGE UP (ref 0–0.9)
MONOCYTES NFR BLD AUTO: 7.3 % — SIGNIFICANT CHANGE UP (ref 2–14)
NEUTROPHILS # BLD AUTO: 7.24 K/UL — SIGNIFICANT CHANGE UP (ref 1.8–7.4)
NEUTROPHILS NFR BLD AUTO: 71.1 % — SIGNIFICANT CHANGE UP (ref 43–77)
NRBC BLD AUTO-RTO: 0 /100 WBCS — SIGNIFICANT CHANGE UP (ref 0–0)
PHOSPHATE SERPL-MCNC: 2.7 MG/DL — SIGNIFICANT CHANGE UP (ref 2.5–4.5)
PHOSPHATE SERPL-MCNC: 3.4 MG/DL — SIGNIFICANT CHANGE UP (ref 2.5–4.5)
PLATELET # BLD AUTO: 283 K/UL — SIGNIFICANT CHANGE UP (ref 150–400)
POTASSIUM SERPL-MCNC: 3.6 MMOL/L — SIGNIFICANT CHANGE UP (ref 3.5–5.3)
POTASSIUM SERPL-MCNC: 3.9 MMOL/L — SIGNIFICANT CHANGE UP (ref 3.5–5.3)
POTASSIUM SERPL-SCNC: 3.6 MMOL/L — SIGNIFICANT CHANGE UP (ref 3.5–5.3)
POTASSIUM SERPL-SCNC: 3.9 MMOL/L — SIGNIFICANT CHANGE UP (ref 3.5–5.3)
PROT SERPL-MCNC: 6.6 G/DL — SIGNIFICANT CHANGE UP (ref 6–8.3)
PROT SERPL-MCNC: 7 G/DL — SIGNIFICANT CHANGE UP (ref 6–8.3)
PROTHROM AB SERPL-ACNC: 12.5 SEC — SIGNIFICANT CHANGE UP (ref 9.9–13.4)
RAPID RVP RESULT: SIGNIFICANT CHANGE UP
RBC # BLD: 5.44 M/UL — SIGNIFICANT CHANGE UP (ref 4.2–5.8)
RBC # FLD: 12.4 % — SIGNIFICANT CHANGE UP (ref 10.3–14.5)
SARS-COV-2 RNA SPEC QL NAA+PROBE: SIGNIFICANT CHANGE UP
SODIUM SERPL-SCNC: 141 MMOL/L — SIGNIFICANT CHANGE UP (ref 135–145)
SODIUM SERPL-SCNC: 142 MMOL/L — SIGNIFICANT CHANGE UP (ref 135–145)
WBC # BLD: 10.17 K/UL — SIGNIFICANT CHANGE UP (ref 3.8–10.5)
WBC # FLD AUTO: 10.17 K/UL — SIGNIFICANT CHANGE UP (ref 3.8–10.5)

## 2025-03-04 PROCEDURE — 99291 CRITICAL CARE FIRST HOUR: CPT | Mod: GC

## 2025-03-04 RX ORDER — LIDOCAINE HYDROCHLORIDE 20 MG/ML
1 JELLY TOPICAL EVERY 24 HOURS
Refills: 0 | Status: DISCONTINUED | OUTPATIENT
Start: 2025-03-04 | End: 2025-03-06

## 2025-03-04 RX ORDER — ACETAMINOPHEN 500 MG/5ML
650 LIQUID (ML) ORAL EVERY 6 HOURS
Refills: 0 | Status: DISCONTINUED | OUTPATIENT
Start: 2025-03-04 | End: 2025-03-06

## 2025-03-04 RX ORDER — SODIUM CHLORIDE 9 G/1000ML
1000 INJECTION, SOLUTION INTRAVENOUS
Refills: 0 | Status: DISCONTINUED | OUTPATIENT
Start: 2025-03-04 | End: 2025-03-04

## 2025-03-04 RX ORDER — FUROSEMIDE 10 MG/ML
40 INJECTION INTRAMUSCULAR; INTRAVENOUS ONCE
Refills: 0 | Status: COMPLETED | OUTPATIENT
Start: 2025-03-04 | End: 2025-03-04

## 2025-03-04 RX ORDER — ACETAMINOPHEN 500 MG/5ML
1000 LIQUID (ML) ORAL ONCE
Refills: 0 | Status: COMPLETED | OUTPATIENT
Start: 2025-03-04 | End: 2025-03-04

## 2025-03-04 RX ADMIN — FUROSEMIDE 40 MILLIGRAM(S): 10 INJECTION INTRAMUSCULAR; INTRAVENOUS at 10:23

## 2025-03-04 RX ADMIN — SODIUM CHLORIDE 25 MILLILITER(S): 9 INJECTION, SOLUTION INTRAVENOUS at 03:17

## 2025-03-04 RX ADMIN — Medication 400 MILLIGRAM(S): at 17:14

## 2025-03-04 RX ADMIN — IPRATROPIUM BROMIDE AND ALBUTEROL SULFATE 3 MILLILITER(S): .5; 2.5 SOLUTION RESPIRATORY (INHALATION) at 05:28

## 2025-03-04 RX ADMIN — Medication 1 TABLET(S): at 12:01

## 2025-03-04 RX ADMIN — Medication 25 GRAM(S): at 06:33

## 2025-03-04 RX ADMIN — Medication 1 APPLICATION(S): at 05:35

## 2025-03-04 RX ADMIN — FOLIC ACID 1 MILLIGRAM(S): 1 TABLET ORAL at 12:01

## 2025-03-04 RX ADMIN — IPRATROPIUM BROMIDE AND ALBUTEROL SULFATE 3 MILLILITER(S): .5; 2.5 SOLUTION RESPIRATORY (INHALATION) at 17:15

## 2025-03-04 RX ADMIN — ENOXAPARIN SODIUM 40 MILLIGRAM(S): 100 INJECTION SUBCUTANEOUS at 14:32

## 2025-03-04 RX ADMIN — Medication 101 MILLIGRAM(S): at 13:01

## 2025-03-04 RX ADMIN — Medication 25 GRAM(S): at 01:26

## 2025-03-04 RX ADMIN — Medication 100 MILLIEQUIVALENT(S): at 03:17

## 2025-03-04 RX ADMIN — Medication 100 MILLIEQUIVALENT(S): at 01:47

## 2025-03-04 RX ADMIN — IPRATROPIUM BROMIDE AND ALBUTEROL SULFATE 3 MILLILITER(S): .5; 2.5 SOLUTION RESPIRATORY (INHALATION) at 23:15

## 2025-03-04 RX ADMIN — Medication 100 MILLIEQUIVALENT(S): at 05:34

## 2025-03-04 RX ADMIN — LIDOCAINE HYDROCHLORIDE 1 PATCH: 20 JELLY TOPICAL at 19:09

## 2025-03-04 RX ADMIN — IPRATROPIUM BROMIDE AND ALBUTEROL SULFATE 3 MILLILITER(S): .5; 2.5 SOLUTION RESPIRATORY (INHALATION) at 11:18

## 2025-03-04 RX ADMIN — LOSARTAN POTASSIUM 25 MILLIGRAM(S): 100 TABLET, FILM COATED ORAL at 17:16

## 2025-03-04 RX ADMIN — LIDOCAINE HYDROCHLORIDE 1 PATCH: 20 JELLY TOPICAL at 17:14

## 2025-03-04 RX ADMIN — Medication 1000 MILLIGRAM(S): at 18:00

## 2025-03-04 NOTE — PHYSICAL THERAPY INITIAL EVALUATION ADULT - ADDITIONAL COMMENTS
Pt lives with brother in an apartment with 12-15 steps to enter. Prior to admission, independent in ADLs and ambulation. +driving

## 2025-03-04 NOTE — PROGRESS NOTE ADULT - ASSESSMENT
Lori Torres is a 40-year-old male with a past medical history of NICM (EF 21% 07/24), HTN, JEFF presenting with a one day history of SOB and productive cough.     === NEUROLOGY ===  # No acute issues      === PULMONARY ===  # AHRF  - Etiology likely 2/2 CHF in the setting of poor medication access, with PNA also on differential  - No O2 at baseline, on new bipap   - CXR with bilateral patchy airspace opacities  - urine strep/legionella  - procal      === CARDIOVASCULAR ===  #ADHF  - BNP elevated to 1000 from previous around 400-500  - BRITT on exam and CXR with bilateral airspace opacities    # NICM   - per HF in EMR, etiology could be 2/2 EtOH vs genetic  - TTE from 07/24: Left ventricular cavity is mildly dilated. EF 21%.  No regional wall motion abnormalities, but multiple segmental abnormalities. Mild (grade 1) left ventricular diastolic dysfunction. LA mildly dilated.      === RENAL ===  # No acute issues      === GASTROINTESTINAL ===  # Diarrhea  - a one day history of diarrhea  - CTM      === INFECTIOUS DISEASE ===  # SIRS  - presenting tachypneic and hypoxic, meeting SIRS criteria  - CXR with bilateral airspace opacities, infectious vs pulmonary edema. Would favor cardiac source of pulmonary edema  - blood cultures x2      === ENDOCRINE ===  # No acute issues      === HEME ===  # No acute issues      === FLUIDS/ELECTROLYTES/NUTRITION ===  # Diet: DASH/TLC once off of BiPAP  # Monitor, Replete to K>4 and Mg>2  # Transfuse for Hgb <7, Plt<10      === PROPHYLAXIS ===  # DVT: Lovenox   Lori Torres is a 40-year-old male with a past medical history of NICM (EF 21% 07/24), HTN, JEFF presenting with a one day history of SOB and productive cough.     === NEUROLOGY ===  # No acute issues      === PULMONARY ===  # AHRF  - Etiology likely 2/2 CHF in the setting of poor medication access, with PNA also on differential though less likely  - No O2 at baseline, on new bipap/avaps   - CXR with bilateral patchy airspace opacities  - trial HFNC       === CARDIOVASCULAR ===  #ADHF  - BNP elevated to 1000 from previous around 400-500  - BRITT on exam and CXR with bilateral airspace opacities  - giving 40 mg IV Lasix  - if tolerates HFNC, will give losartan otherwise will start IV enalapril    # NICM   - per HF in EMR, etiology could be 2/2 EtOH vs genetic  - TTE from 07/24: Left ventricular cavity is mildly dilated. EF 21%.  No regional wall motion abnormalities, but multiple segmental abnormalities. Mild (grade 1) left ventricular diastolic dysfunction. LA mildly dilated.      === RENAL ===  # No acute issues      === GASTROINTESTINAL ===  # Diarrhea  - a one day history of diarrhea - resolved      === INFECTIOUS DISEASE ===  # SIRS  - presenting tachypneic and hypoxic, meeting SIRS criteria  - CXR with bilateral airspace opacities, infectious vs pulmonary edema. Would favor cardiac source of pulmonary edema  - blood cultures x2  - d/c zosyn      === ENDOCRINE ===  # No acute issues      === HEME ===  # No acute issues      === FLUIDS/ELECTROLYTES/NUTRITION ===  # Diet: DASH/TLC once off of BiPAP  # Monitor, Replete to K>4 and Mg>2  # Transfuse for Hgb <7, Plt<10      === PROPHYLAXIS ===  # DVT: Lovenox

## 2025-03-04 NOTE — PHYSICAL THERAPY INITIAL EVALUATION ADULT - DIAGNOSIS, PT EVAL
Pt presents w/ functional deficits that include balance and deconditioning that impact pt's ability to perform functional mobility

## 2025-03-04 NOTE — PHYSICAL THERAPY INITIAL EVALUATION ADULT - PERTINENT HX OF CURRENT PROBLEM, REHAB EVAL
Lori Torres is a 40-year-old male with a past medical history of NICM (EF 21% 07/24), HTN, JEFF presenting with a one day history of SOB and productive cough.   Last night, started having increasing SOB, a productive cough, and chest discomfort. It was getting worse, prompting him to the come to the ED. Also noticed diarrhea x1 day and possibly a fever at home. No sick contacts. Per EMR, has intermittent diuretic use and poor healthcare follow up. Discussing with patient, he was having difficulties taking all of his medications due to insurance coverage.     ED Course: On presentation, 82% on RA and tachypneic to 35, hypertensive to 159/100. Started on BiPAP for increased WOB and hypoxia. Given Lasix 40 mg IV x1. VBG with pH 7.31. RVP neg. CXR with pulmonary edema vs pneumonia.

## 2025-03-04 NOTE — PROGRESS NOTE ADULT - ATTENDING COMMENTS
1. Acute hypoxemic respiratory failure due to decompensated HFrEF. Pt non compliant with medications. Ran out of meds 2 weeks ago.  Fever last night. Changed to AVAPS with VT 600mls. RR  20. Looks more comfortable.      2..Cardiac. Decompensated HFrEF. Continue aggressive diuresis. Clinically improved on AVAPS and diuresis.   Trial off NIPPV and on Hi Flow. Start Losartan 25 mg BID.                          Non ischemic cardiomyopathy. ? from ETOH use.    3.ETOH use. Pt says he last  drank vodka  6 days ago. He does not have tremors today. No signs of ETOH withdrawal. .   4 ID. Fever last night. Procal wnl. Stop  antibiotics. Sent full RVP. .No signs of  bacterial infection at this point.  5. DVT prophylaxis Lovenox  6. GOC: Full code

## 2025-03-04 NOTE — SBIRT NOTE ADULT - NSSBIRTALCPOSREINDET_GEN_A_CORE
Screening results were reviewed with the patient and the patient was provided information about healthy guidelines and potential negative consequences associated with level of risk. Motivation and readiness to reduce or stop use was discussed and goals and activities to make changes were suggested/offered.

## 2025-03-04 NOTE — PHYSICAL THERAPY INITIAL EVALUATION ADULT - PLANNED THERAPY INTERVENTIONS, PT EVAL
Goal: In 2-weeks pt will negotiate 1 flight of stairs with handrail independently./balance training/bed mobility training/gait training/strengthening/transfer training

## 2025-03-04 NOTE — OCCUPATIONAL THERAPY INITIAL EVALUATION ADULT - ADDITIONAL COMMENTS
Pt lives in an apartment w/ his brother, 1 flight to enter. Pt was independent with all functional mobility and ADLs prior to admission without AD.

## 2025-03-04 NOTE — PROGRESS NOTE ADULT - SUBJECTIVE AND OBJECTIVE BOX
INTERVAL HPI/OVERNIGHT EVENTS:    SUBJECTIVE: Patient seen and examined at bedside.       VITAL SIGNS:  ICU Vital Signs Last 24 Hrs  T(C): 36.5 (04 Mar 2025 04:00), Max: 38.5 (03 Mar 2025 21:00)  T(F): 97.7 (04 Mar 2025 04:00), Max: 101.3 (03 Mar 2025 21:00)  HR: 66 (04 Mar 2025 06:00) (64 - 97)  BP: 108/55 (04 Mar 2025 06:00) (99/50 - 141/86)  BP(mean): 76 (04 Mar 2025 06:00) (72 - 112)  ABP: --  ABP(mean): --  RR: 24 (04 Mar 2025 06:00) (20 - 49)  SpO2: 97% (04 Mar 2025 06:00) (90% - 99%)    O2 Parameters below as of 04 Mar 2025 05:45  Patient On (Oxygen Delivery Method): BiPAP/CPAP            Plateau pressure:   P/F ratio:     03-03 @ 07:01  -  03-04 @ 07:00  --------------------------------------------------------  IN: 775 mL / OUT: 1850 mL / NET: -1075 mL      CAPILLARY BLOOD GLUCOSE        ECG:  Physical Exam:   General: NAD  HEENT: PERRL, EOMI, normal sclera and conjunctiva, no oral lesions  Neck: Supple, no JVD  Lungs: CTA bilaterally  Heart: RRR, normal S1S2, no murmurs/rubs/gallops  Abdomen: Soft, ND/NT  Extremities: 2+ peripheral pulses, no cyanosis/clubbing/edema, full ROM  Skin: Warm, well-perfused  Neuro: A&O x3, no focal deficits    MEDICATIONS:  MEDICATIONS  (STANDING):  albuterol/ipratropium for Nebulization 3 milliLiter(s) Nebulizer every 6 hours  chlorhexidine 2% Cloths 1 Application(s) Topical <User Schedule>  dextrose 5% + lactated ringers. 1000 milliLiter(s) (25 mL/Hr) IV Continuous <Continuous>  enoxaparin Injectable 40 milliGRAM(s) SubCutaneous every 24 hours  folic acid 1 milliGRAM(s) Oral daily  losartan 25 milliGRAM(s) Oral two times a day  metoprolol succinate ER 50 milliGRAM(s) Oral daily  multivitamin 1 Tablet(s) Oral daily  piperacillin/tazobactam IVPB.. 3.375 Gram(s) IV Intermittent every 8 hours  spironolactone 25 milliGRAM(s) Oral daily  thiamine IVPB 100 milliGRAM(s) IV Intermittent daily    MEDICATIONS  (PRN):  LORazepam   Injectable 2 milliGRAM(s) IV Push every 1 hour PRN CIWA 8-14  LORazepam   Injectable 4 milliGRAM(s) IV Push every 1 hour PRN CIWA 15 or greater, or seizure      ALLERGIES:  Allergies    No Known Allergies    Intolerances        LABS:                        16.0   10.17 )-----------( 283      ( 04 Mar 2025 00:42 )             47.3     03-04    141  |  106  |  14  ----------------------------<  82  3.6   |  23  |  1.22    Ca    8.5      04 Mar 2025 00:42  Phos  3.4     03-04  Mg     2.0     03-04    TPro  7.0  /  Alb  3.8  /  TBili  1.0  /  DBili  x   /  AST  20  /  ALT  27  /  AlkPhos  61  03-04    PT/INR - ( 04 Mar 2025 00:42 )   PT: 12.5 sec;   INR: 1.09 ratio         PTT - ( 04 Mar 2025 00:42 )  PTT:29.7 sec  Urinalysis Basic - ( 04 Mar 2025 00:42 )    Color: x / Appearance: x / SG: x / pH: x  Gluc: 82 mg/dL / Ketone: x  / Bili: x / Urobili: x   Blood: x / Protein: x / Nitrite: x   Leuk Esterase: x / RBC: x / WBC x   Sq Epi: x / Non Sq Epi: x / Bacteria: x        RADIOLOGY & ADDITIONAL TESTS: Reviewed. INTERVAL HPI/OVERNIGHT EVENTS: Febrile to 101.3 in the setting of RR to 50s. Transitioned to AVAPs from BiPAP, 13 beats of NSVT while asymptomatic    SUBJECTIVE: Patient seen and examined at bedside. No acute concerns this AM, feels that breathing is about the same.      VITAL SIGNS:  ICU Vital Signs Last 24 Hrs  T(C): 36.5 (04 Mar 2025 04:00), Max: 38.5 (03 Mar 2025 21:00)  T(F): 97.7 (04 Mar 2025 04:00), Max: 101.3 (03 Mar 2025 21:00)  HR: 66 (04 Mar 2025 06:00) (64 - 97)  BP: 108/55 (04 Mar 2025 06:00) (99/50 - 141/86)  BP(mean): 76 (04 Mar 2025 06:00) (72 - 112)  ABP: --  ABP(mean): --  RR: 24 (04 Mar 2025 06:00) (20 - 49)  SpO2: 97% (04 Mar 2025 06:00) (90% - 99%)    O2 Parameters below as of 04 Mar 2025 05:45  Patient On (Oxygen Delivery Method): BiPAP/CPAP            Plateau pressure:   P/F ratio:     03-03 @ 07:01  -  03-04 @ 07:00  --------------------------------------------------------  IN: 775 mL / OUT: 1850 mL / NET: -1075 mL      CAPILLARY BLOOD GLUCOSE        ECG:  Physical Exam:   GENERAL: NAD, well-groomed, well-developed  HEAD:  Atraumatic, Normocephalic  EYES: EOMI, conjunctiva and sclera clear  ENMT: Moist mucous membranes  NECK: Supple  NERVOUS SYSTEM: AOX3, motor and sensation grossly intact in b/l UE and b/l LE  PSYCHIATRIC: Appropriate affect and mood  CHEST/LUNG: on avaps, increased WOB, bilateral crackles in bilateral lung bases, no wheezes  HEART: Regular rate and rhythm; No murmurs, rubs, or gallops. Trace LE edema  ABDOMEN: Soft, Nontender, Nondistended;  EXTREMITIES: No clubbing, cyanosis  SKIN: No rashes or lesions    MEDICATIONS:  MEDICATIONS  (STANDING):  albuterol/ipratropium for Nebulization 3 milliLiter(s) Nebulizer every 6 hours  chlorhexidine 2% Cloths 1 Application(s) Topical <User Schedule>  dextrose 5% + lactated ringers. 1000 milliLiter(s) (25 mL/Hr) IV Continuous <Continuous>  enoxaparin Injectable 40 milliGRAM(s) SubCutaneous every 24 hours  folic acid 1 milliGRAM(s) Oral daily  losartan 25 milliGRAM(s) Oral two times a day  metoprolol succinate ER 50 milliGRAM(s) Oral daily  multivitamin 1 Tablet(s) Oral daily  piperacillin/tazobactam IVPB.. 3.375 Gram(s) IV Intermittent every 8 hours  spironolactone 25 milliGRAM(s) Oral daily  thiamine IVPB 100 milliGRAM(s) IV Intermittent daily    MEDICATIONS  (PRN):  LORazepam   Injectable 2 milliGRAM(s) IV Push every 1 hour PRN CIWA 8-14  LORazepam   Injectable 4 milliGRAM(s) IV Push every 1 hour PRN CIWA 15 or greater, or seizure      ALLERGIES:  Allergies    No Known Allergies    Intolerances        LABS:                        16.0   10.17 )-----------( 283      ( 04 Mar 2025 00:42 )             47.3     03-04    141  |  106  |  14  ----------------------------<  82  3.6   |  23  |  1.22    Ca    8.5      04 Mar 2025 00:42  Phos  3.4     03-04  Mg     2.0     03-04    TPro  7.0  /  Alb  3.8  /  TBili  1.0  /  DBili  x   /  AST  20  /  ALT  27  /  AlkPhos  61  03-04    PT/INR - ( 04 Mar 2025 00:42 )   PT: 12.5 sec;   INR: 1.09 ratio         PTT - ( 04 Mar 2025 00:42 )  PTT:29.7 sec  Urinalysis Basic - ( 04 Mar 2025 00:42 )    Color: x / Appearance: x / SG: x / pH: x  Gluc: 82 mg/dL / Ketone: x  / Bili: x / Urobili: x   Blood: x / Protein: x / Nitrite: x   Leuk Esterase: x / RBC: x / WBC x   Sq Epi: x / Non Sq Epi: x / Bacteria: x        RADIOLOGY & ADDITIONAL TESTS: Reviewed.

## 2025-03-05 DIAGNOSIS — Z29.9 ENCOUNTER FOR PROPHYLACTIC MEASURES, UNSPECIFIED: ICD-10-CM

## 2025-03-05 DIAGNOSIS — I42.8 OTHER CARDIOMYOPATHIES: ICD-10-CM

## 2025-03-05 DIAGNOSIS — F10.20 ALCOHOL DEPENDENCE, UNCOMPLICATED: ICD-10-CM

## 2025-03-05 DIAGNOSIS — I10 ESSENTIAL (PRIMARY) HYPERTENSION: ICD-10-CM

## 2025-03-05 DIAGNOSIS — I50.23 ACUTE ON CHRONIC SYSTOLIC (CONGESTIVE) HEART FAILURE: ICD-10-CM

## 2025-03-05 DIAGNOSIS — F17.200 NICOTINE DEPENDENCE, UNSPECIFIED, UNCOMPLICATED: ICD-10-CM

## 2025-03-05 LAB
ALBUMIN SERPL ELPH-MCNC: 3.4 G/DL — SIGNIFICANT CHANGE UP (ref 3.3–5)
ALP SERPL-CCNC: 60 U/L — SIGNIFICANT CHANGE UP (ref 40–120)
ALT FLD-CCNC: 21 U/L — SIGNIFICANT CHANGE UP (ref 10–45)
ANION GAP SERPL CALC-SCNC: 8 MMOL/L — SIGNIFICANT CHANGE UP (ref 5–17)
APTT BLD: 27.6 SEC — SIGNIFICANT CHANGE UP (ref 24.5–35.6)
AST SERPL-CCNC: 15 U/L — SIGNIFICANT CHANGE UP (ref 10–40)
BASOPHILS # BLD AUTO: 0.05 K/UL — SIGNIFICANT CHANGE UP (ref 0–0.2)
BASOPHILS NFR BLD AUTO: 0.8 % — SIGNIFICANT CHANGE UP (ref 0–2)
BILIRUB SERPL-MCNC: 0.3 MG/DL — SIGNIFICANT CHANGE UP (ref 0.2–1.2)
BUN SERPL-MCNC: 17 MG/DL — SIGNIFICANT CHANGE UP (ref 7–23)
CALCIUM SERPL-MCNC: 8.4 MG/DL — SIGNIFICANT CHANGE UP (ref 8.4–10.5)
CHLORIDE SERPL-SCNC: 107 MMOL/L — SIGNIFICANT CHANGE UP (ref 96–108)
CO2 SERPL-SCNC: 24 MMOL/L — SIGNIFICANT CHANGE UP (ref 22–31)
CREAT SERPL-MCNC: 1.12 MG/DL — SIGNIFICANT CHANGE UP (ref 0.5–1.3)
EGFR: 85 ML/MIN/1.73M2 — SIGNIFICANT CHANGE UP
EGFR: 85 ML/MIN/1.73M2 — SIGNIFICANT CHANGE UP
EOSINOPHIL # BLD AUTO: 0.19 K/UL — SIGNIFICANT CHANGE UP (ref 0–0.5)
EOSINOPHIL NFR BLD AUTO: 2.9 % — SIGNIFICANT CHANGE UP (ref 0–6)
GAS PNL BLDA: SIGNIFICANT CHANGE UP
GLUCOSE SERPL-MCNC: 127 MG/DL — HIGH (ref 70–99)
HCT VFR BLD CALC: 44 % — SIGNIFICANT CHANGE UP (ref 39–50)
HGB BLD-MCNC: 14.8 G/DL — SIGNIFICANT CHANGE UP (ref 13–17)
IMM GRANULOCYTES NFR BLD AUTO: 0.2 % — SIGNIFICANT CHANGE UP (ref 0–0.9)
INR BLD: 1.06 RATIO — SIGNIFICANT CHANGE UP (ref 0.85–1.16)
LYMPHOCYTES # BLD AUTO: 2.14 K/UL — SIGNIFICANT CHANGE UP (ref 1–3.3)
LYMPHOCYTES # BLD AUTO: 32.8 % — SIGNIFICANT CHANGE UP (ref 13–44)
MAGNESIUM SERPL-MCNC: 2.3 MG/DL — SIGNIFICANT CHANGE UP (ref 1.6–2.6)
MCHC RBC-ENTMCNC: 29.5 PG — SIGNIFICANT CHANGE UP (ref 27–34)
MCHC RBC-ENTMCNC: 33.6 G/DL — SIGNIFICANT CHANGE UP (ref 32–36)
MCV RBC AUTO: 87.8 FL — SIGNIFICANT CHANGE UP (ref 80–100)
MONOCYTES # BLD AUTO: 0.7 K/UL — SIGNIFICANT CHANGE UP (ref 0–0.9)
MONOCYTES NFR BLD AUTO: 10.7 % — SIGNIFICANT CHANGE UP (ref 2–14)
NEUTROPHILS # BLD AUTO: 3.44 K/UL — SIGNIFICANT CHANGE UP (ref 1.8–7.4)
NEUTROPHILS NFR BLD AUTO: 52.6 % — SIGNIFICANT CHANGE UP (ref 43–77)
NRBC BLD AUTO-RTO: 0 /100 WBCS — SIGNIFICANT CHANGE UP (ref 0–0)
PHOSPHATE SERPL-MCNC: 3 MG/DL — SIGNIFICANT CHANGE UP (ref 2.5–4.5)
PLATELET # BLD AUTO: 280 K/UL — SIGNIFICANT CHANGE UP (ref 150–400)
POTASSIUM SERPL-MCNC: 4.1 MMOL/L — SIGNIFICANT CHANGE UP (ref 3.5–5.3)
POTASSIUM SERPL-SCNC: 4.1 MMOL/L — SIGNIFICANT CHANGE UP (ref 3.5–5.3)
PROT SERPL-MCNC: 6.4 G/DL — SIGNIFICANT CHANGE UP (ref 6–8.3)
PROTHROM AB SERPL-ACNC: 12.1 SEC — SIGNIFICANT CHANGE UP (ref 9.9–13.4)
RBC # BLD: 5.01 M/UL — SIGNIFICANT CHANGE UP (ref 4.2–5.8)
RBC # FLD: 12.2 % — SIGNIFICANT CHANGE UP (ref 10.3–14.5)
S PNEUM AG UR QL: NEGATIVE — SIGNIFICANT CHANGE UP
SODIUM SERPL-SCNC: 139 MMOL/L — SIGNIFICANT CHANGE UP (ref 135–145)
WBC # BLD: 6.53 K/UL — SIGNIFICANT CHANGE UP (ref 3.8–10.5)
WBC # FLD AUTO: 6.53 K/UL — SIGNIFICANT CHANGE UP (ref 3.8–10.5)

## 2025-03-05 PROCEDURE — 99233 SBSQ HOSP IP/OBS HIGH 50: CPT | Mod: GC

## 2025-03-05 PROCEDURE — 99223 1ST HOSP IP/OBS HIGH 75: CPT

## 2025-03-05 RX ORDER — LOSARTAN POTASSIUM 100 MG/1
50 TABLET, FILM COATED ORAL
Refills: 0 | Status: DISCONTINUED | OUTPATIENT
Start: 2025-03-05 | End: 2025-03-06

## 2025-03-05 RX ORDER — METOPROLOL SUCCINATE 50 MG/1
50 TABLET, EXTENDED RELEASE ORAL DAILY
Refills: 0 | Status: DISCONTINUED | OUTPATIENT
Start: 2025-03-06 | End: 2025-03-06

## 2025-03-05 RX ORDER — NICOTINE POLACRILEX 4 MG/1
1 GUM, CHEWING ORAL DAILY
Refills: 0 | Status: DISCONTINUED | OUTPATIENT
Start: 2025-03-05 | End: 2025-03-06

## 2025-03-05 RX ORDER — MAGNESIUM, ALUMINUM HYDROXIDE 200-200 MG
30 TABLET,CHEWABLE ORAL EVERY 4 HOURS
Refills: 0 | Status: DISCONTINUED | OUTPATIENT
Start: 2025-03-05 | End: 2025-03-06

## 2025-03-05 RX ORDER — SPIRONOLACTONE 25 MG
25 TABLET ORAL DAILY
Refills: 0 | Status: DISCONTINUED | OUTPATIENT
Start: 2025-03-05 | End: 2025-03-06

## 2025-03-05 RX ORDER — BUMETANIDE 1 MG/1
1 TABLET ORAL DAILY
Refills: 0 | Status: DISCONTINUED | OUTPATIENT
Start: 2025-03-05 | End: 2025-03-06

## 2025-03-05 RX ORDER — MELATONIN 5 MG
3 TABLET ORAL AT BEDTIME
Refills: 0 | Status: DISCONTINUED | OUTPATIENT
Start: 2025-03-05 | End: 2025-03-06

## 2025-03-05 RX ORDER — ONDANSETRON HCL/PF 4 MG/2 ML
4 VIAL (ML) INJECTION EVERY 8 HOURS
Refills: 0 | Status: DISCONTINUED | OUTPATIENT
Start: 2025-03-05 | End: 2025-03-06

## 2025-03-05 RX ADMIN — IPRATROPIUM BROMIDE AND ALBUTEROL SULFATE 3 MILLILITER(S): .5; 2.5 SOLUTION RESPIRATORY (INHALATION) at 17:59

## 2025-03-05 RX ADMIN — Medication 1 TABLET(S): at 11:05

## 2025-03-05 RX ADMIN — IPRATROPIUM BROMIDE AND ALBUTEROL SULFATE 3 MILLILITER(S): .5; 2.5 SOLUTION RESPIRATORY (INHALATION) at 11:20

## 2025-03-05 RX ADMIN — FOLIC ACID 1 MILLIGRAM(S): 1 TABLET ORAL at 11:05

## 2025-03-05 RX ADMIN — BUMETANIDE 1 MILLIGRAM(S): 1 TABLET ORAL at 18:00

## 2025-03-05 RX ADMIN — NICOTINE POLACRILEX 1 PATCH: 4 GUM, CHEWING ORAL at 20:00

## 2025-03-05 RX ADMIN — LIDOCAINE HYDROCHLORIDE 1 PATCH: 20 JELLY TOPICAL at 17:59

## 2025-03-05 RX ADMIN — ENOXAPARIN SODIUM 40 MILLIGRAM(S): 100 INJECTION SUBCUTANEOUS at 17:59

## 2025-03-05 RX ADMIN — LOSARTAN POTASSIUM 50 MILLIGRAM(S): 100 TABLET, FILM COATED ORAL at 18:00

## 2025-03-05 RX ADMIN — LOSARTAN POTASSIUM 25 MILLIGRAM(S): 100 TABLET, FILM COATED ORAL at 05:50

## 2025-03-05 RX ADMIN — Medication 650 MILLIGRAM(S): at 09:37

## 2025-03-05 RX ADMIN — METOPROLOL SUCCINATE 50 MILLIGRAM(S): 50 TABLET, EXTENDED RELEASE ORAL at 08:12

## 2025-03-05 RX ADMIN — IPRATROPIUM BROMIDE AND ALBUTEROL SULFATE 3 MILLILITER(S): .5; 2.5 SOLUTION RESPIRATORY (INHALATION) at 05:29

## 2025-03-05 RX ADMIN — NICOTINE POLACRILEX 1 PATCH: 4 GUM, CHEWING ORAL at 17:58

## 2025-03-05 RX ADMIN — LIDOCAINE HYDROCHLORIDE 1 PATCH: 20 JELLY TOPICAL at 05:36

## 2025-03-05 RX ADMIN — Medication 101 MILLIGRAM(S): at 11:05

## 2025-03-05 RX ADMIN — Medication 650 MILLIGRAM(S): at 08:09

## 2025-03-05 RX ADMIN — Medication 25 MILLIGRAM(S): at 08:10

## 2025-03-05 RX ADMIN — Medication 1 APPLICATION(S): at 05:54

## 2025-03-05 NOTE — PROGRESS NOTE ADULT - ASSESSMENT
Lori Torres is a 40-year-old male with a past medical history of NICM (EF 21% 07/24), HTN, JEFF presenting with a one day history of SOB and productive cough.     === NEUROLOGY ===  # No acute issues      === PULMONARY ===  # AHRF  - Etiology likely 2/2 CHF in the setting of poor medication access, with PNA also on differential though less likely  - No O2 at baseline, on new bipap/avaps   - CXR with bilateral patchy airspace opacities  - trial HFNC       === CARDIOVASCULAR ===  #ADHF  - BNP elevated to 1000 from previous around 400-500  - BRITT on exam and CXR with bilateral airspace opacities  - giving 40 mg IV Lasix  - if tolerates HFNC, will give losartan otherwise will start IV enalapril    # NICM   - per HF in EMR, etiology could be 2/2 EtOH vs genetic  - TTE from 07/24: Left ventricular cavity is mildly dilated. EF 21%.  No regional wall motion abnormalities, but multiple segmental abnormalities. Mild (grade 1) left ventricular diastolic dysfunction. LA mildly dilated.      === RENAL ===  # No acute issues      === GASTROINTESTINAL ===  # Diarrhea  - a one day history of diarrhea - resolved      === INFECTIOUS DISEASE ===  # SIRS  - presenting tachypneic and hypoxic, meeting SIRS criteria  - CXR with bilateral airspace opacities, infectious vs pulmonary edema. Would favor cardiac source of pulmonary edema  - blood cultures x2  - d/c zosyn      === ENDOCRINE ===  # No acute issues      === HEME ===  # No acute issues      === FLUIDS/ELECTROLYTES/NUTRITION ===  # Diet: DASH/TLC once off of BiPAP  # Monitor, Replete to K>4 and Mg>2  # Transfuse for Hgb <7, Plt<10      === PROPHYLAXIS ===  # DVT: Lovenox   ASSESSMENT & PLAN:   Lori Torres is a 40-year-old male with a past medical history of NICM (EF 21% 07/24), HTN, JEFF presenting with a one day history of SOB and productive cough concerning for heart failure exacerbation.    === NEUROLOGY ===  # No acute issues      === PULMONARY ===  # AHRF  - Etiology likely 2/2 CHF in the setting of poor medication access, with PNA also on differential though less likely  - No O2 at baseline, was on bipap/avaps -> HFNC -> weaned to NC  - CXR with bilateral patchy airspace opacities      === CARDIOVASCULAR ===  #ADHF  - BNP elevated to 1000 from previous around 400-500  - BRITT on exam and CXR with bilateral airspace opacities  - started bumex 1 mg daily  - losartan 50 mg BID    # NICM   - per HF in EMR, etiology could be 2/2 EtOH vs genetic  - TTE from 07/24: Left ventricular cavity is mildly dilated. EF 21%.  No regional wall motion abnormalities, but multiple segmental abnormalities. Mild (grade 1) left ventricular diastolic dysfunction. LA mildly dilated.      === RENAL ===  # No acute issues      === GASTROINTESTINAL ===  # Diarrhea  - a one day history of diarrhea - resolved      === INFECTIOUS DISEASE ===  # SIRS  - presenting tachypneic and hypoxic, meeting SIRS criteria  - CXR with bilateral airspace opacities, infectious vs pulmonary edema. Would favor cardiac source of pulmonary edema  - blood cultures x2: NGTD  - d/c zosyn  - RVP negative      === ENDOCRINE ===  # No acute issues      === HEME ===  # No acute issues      === FLUIDS/ELECTROLYTES/NUTRITION ===  # Diet: DASH/TLC  # Monitor, Replete to K>4 and Mg>2  # Transfuse for Hgb <7, Plt<10      === PROPHYLAXIS ===  # DVT: Lovenox

## 2025-03-05 NOTE — PROGRESS NOTE ADULT - PROBLEM SELECTOR PLAN 6
DVT ppx - Lovenox  Diet - DASH/TLC  Dispo - likely to be medically clear by tomorrow, may benefit from social work eval given history of medication non-adherence, lack of medical follow up, and severe heart failure DVT ppx - Lovenox  Diet - DASH/TLC  Dispo - likely to be medically clear by tomorrow, may benefit from social work eval given history of medication non-adherence, alcohol use disorder, lack of medical follow up, and severe heart failure

## 2025-03-05 NOTE — PROGRESS NOTE ADULT - ATTENDING COMMENTS
1. Acute hypoxemic respiratory failure due to decompensated HFrEF. Pt non compliant with medications. Ran out of meds 2 weeks ago. Tolerating Nasal canula this morning.  Looks comfortable    JEFF: AVAPS nightly.     2..Cardiac. Decompensated HFrEF. > 1 liter net negative last 24 hrs. Continue  metoprolol. Increase Losartan to  50 mg bid. Start Bumex 1 mg po daily.   Current chest pain is musculoskeletal pain from pulled muscle from coughing. .                          Non ischemic cardiomyopathy. ? from ETOH use.    3.ETOH use. Pt says he last  drank vodka  6 days ago. He does not have tremors today. No signs of ETOH withdrawal. .   4 ID. No fever last night. .No signs of  bacterial infection at this point. All cx negative to date.   5. DVT prophylaxis Lovenox  6. [facility]: Full code

## 2025-03-05 NOTE — PROGRESS NOTE ADULT - SUBJECTIVE AND OBJECTIVE BOX
HPI:    Per H&P    "Lori Torres is a 40-year-old male with a past medical history of NICM (EF 21% 07/24), HTN, JEFF presenting with a one day history of SOB and productive cough.   Last night, started having increasing SOB, a productive cough, and chest discomfort. It was getting worse, prompting him to the come to the ED. Also noticed diarrhea x1 day and possibly a fever at home. No sick contacts. Per EMR, has intermittent diuretic use and poor healthcare follow up. Discussing with patient, he was having difficulties taking all of his medications due to insurance coverage.     ED Course: On presentation, 82% on RA and tachypneic to 35, hypertensive to 159/100. Started on BiPAP for increased WOB and hypoxia. Given Lasix 40 mg IV x1. VBG with pH 7.31. RVP neg. CXR with pulmonary edema vs pneumonia. (03 Mar 2025 12:30)"      SUBJECTIVE: Patient seen and examined. Reports resolution of his initial presenting concerns. No longer coughing. No shortness of breath. Denies current complaints.    PAST MEDICAL & SURGICAL HISTORY:    NICM (nonischemic cardiomyopathy)      Chronic systolic congestive heart failure      HTN (hypertension)      Alcohol abuse      No significant past surgical history    REVIEW OF SYSTEMS:    CONSTITUTIONAL: No weakness, fevers or chills  EYES: No visual changes; no sclera icterics, no pain or drainage  ENT:  No vertigo or throat pain   NECK: No pain or stiffness  RESPIRATORY: No cough, wheezing, hemoptysis; No shortness of breath  CARDIOVASCULAR: No chest pain or palpitations  GASTROINTESTINAL: No abdominal or epigastric pain. No nausea, vomiting, or hematemesis; No diarrhea or constipation. No melena or hematochezia.  GENITOURINARY: No dysuria, frequency or hematuria  NEUROLOGICAL: No numbness or weakness  SKIN: No itching, rashes  Psych: No anxiety or depression    Allergies    No Known Allergies      Social History:     FAMILY HISTORY:  No pertinent family history in first degree relatives    Substance use - reports he drink 4-6 shots of vodka daily to manage stress. Smokes ~1 pack of cigarettes per day.    CAPILLARY BLOOD GLUCOSE        Vital Signs Last 24 Hrs  T(C): 36.5 (05 Mar 2025 15:15), Max: 36.8 (05 Mar 2025 12:00)  T(F): 97.7 (05 Mar 2025 15:15), Max: 98.3 (05 Mar 2025 12:00)  HR: 77 (05 Mar 2025 15:15) (69 - 95)  BP: 103/73 (05 Mar 2025 15:15) (93/51 - 153/71)  BP(mean): 85 (05 Mar 2025 14:00) (69 - 102)  RR: 20 (05 Mar 2025 15:15) (12 - 34)  SpO2: 99% (05 Mar 2025 15:15) (93% - 99%)    Parameters below as of 05 Mar 2025 15:15  Patient On (Oxygen Delivery Method): room air        PHYSICAL EXAM:  GENERAL:  Well appearing, in NAD, obese  HEAD:  NCAT  EYES: PERRLA, conjunctiva clear  NECK: Supple, No JVD  CHEST/LUNG: CTA B/L. No m/r/r.  HEART: Reg rate. Normal S1, S2. No m/r/g.   ABDOMEN: SNTND. Bowel sounds present  EXTREMITIES:  2+ Peripheral Pulses, No clubbing, cyanosis, edema.  PSYCH: AAOx3, appropriate affect  NEUROLOGY: grossly non-focal  SKIN: No rashes or lesions    LABS:                        14.8   6.53  )-----------( 280      ( 05 Mar 2025 00:24 )             44.0     03-05    139  |  107  |  17  ----------------------------<  127[H]  4.1   |  24  |  1.12    Ca    8.4      05 Mar 2025 00:24  Phos  3.0     03-05  Mg     2.3     03-05    TPro  6.4  /  Alb  3.4  /  TBili  0.3  /  DBili  x   /  AST  15  /  ALT  21  /  AlkPhos  60  03-05    PT/INR - ( 05 Mar 2025 00:24 )   PT: 12.1 sec;   INR: 1.06 ratio         PTT - ( 05 Mar 2025 00:24 )  PTT:27.6 sec      Urinalysis Basic - ( 05 Mar 2025 00:24 )    Color: x / Appearance: x / SG: x / pH: x  Gluc: 127 mg/dL / Ketone: x  / Bili: x / Urobili: x   Blood: x / Protein: x / Nitrite: x   Leuk Esterase: x / RBC: x / WBC x   Sq Epi: x / Non Sq Epi: x / Bacteria: x        Culture - Blood (collected 03 Mar 2025 14:32)  Source: Blood Blood  Preliminary Report (04 Mar 2025 18:02):    No growth at 24 hours    Culture - Blood (collected 03 Mar 2025 14:30)  Source: Blood Blood  Preliminary Report (04 Mar 2025 18:02):    No growth at 24 hours        MEDICATIONS  (STANDING):  albuterol/ipratropium for Nebulization 3 milliLiter(s) Nebulizer every 6 hours  buMETAnide 1 milliGRAM(s) Oral daily  enoxaparin Injectable 40 milliGRAM(s) SubCutaneous every 24 hours  folic acid 1 milliGRAM(s) Oral daily  lidocaine   4% Patch 1 Patch Transdermal every 24 hours  losartan 50 milliGRAM(s) Oral two times a day  multivitamin 1 Tablet(s) Oral daily  spironolactone 25 milliGRAM(s) Oral daily  thiamine IVPB 100 milliGRAM(s) IV Intermittent daily    MEDICATIONS  (PRN):  acetaminophen     Tablet .. 650 milliGRAM(s) Oral every 6 hours PRN Mild Pain (1 - 3)  LORazepam   Injectable 2 milliGRAM(s) IV Push every 1 hour PRN CIWA 8-14  LORazepam   Injectable 4 milliGRAM(s) IV Push every 1 hour PRN CIWA 15 or greater, or seizure      Home Medications:  losartan 25 mg oral tablet: 1 tab(s) orally 2 times a day (03 Mar 2025 14:37)

## 2025-03-05 NOTE — PROGRESS NOTE ADULT - SUBJECTIVE AND OBJECTIVE BOX
INTERVAL HPI/OVERNIGHT EVENTS:    SUBJECTIVE: Patient seen and examined at bedside.       VITAL SIGNS:  ICU Vital Signs Last 24 Hrs  T(C): 36.6 (05 Mar 2025 04:00), Max: 36.7 (04 Mar 2025 08:00)  T(F): 97.9 (05 Mar 2025 04:00), Max: 98 (04 Mar 2025 08:00)  HR: 80 (05 Mar 2025 07:00) (57 - 95)  BP: 116/73 (05 Mar 2025 07:00) (93/51 - 153/71)  BP(mean): 90 (05 Mar 2025 07:00) (68 - 100)  ABP: --  ABP(mean): --  RR: 12 (05 Mar 2025 07:00) (9 - 35)  SpO2: 96% (05 Mar 2025 07:00) (93% - 99%)    O2 Parameters below as of 05 Mar 2025 05:44  Patient On (Oxygen Delivery Method): nasal cannula, high flow            Plateau pressure:   P/F ratio:     03-04 @ 07:01  -  03-05 @ 07:00  --------------------------------------------------------  IN: 895 mL / OUT: 1850 mL / NET: -955 mL      CAPILLARY BLOOD GLUCOSE        ECG:  Physical Exam:   General: NAD  HEENT: PERRL, EOMI, normal sclera and conjunctiva, no oral lesions  Neck: Supple, no JVD  Lungs: CTA bilaterally  Heart: RRR, normal S1S2, no murmurs/rubs/gallops  Abdomen: Soft, ND/NT  Extremities: 2+ peripheral pulses, no cyanosis/clubbing/edema, full ROM  Skin: Warm, well-perfused  Neuro: A&O x3, no focal deficits    MEDICATIONS:  MEDICATIONS  (STANDING):  albuterol/ipratropium for Nebulization 3 milliLiter(s) Nebulizer every 6 hours  chlorhexidine 2% Cloths 1 Application(s) Topical <User Schedule>  enoxaparin Injectable 40 milliGRAM(s) SubCutaneous every 24 hours  folic acid 1 milliGRAM(s) Oral daily  lidocaine   4% Patch 1 Patch Transdermal every 24 hours  losartan 25 milliGRAM(s) Oral two times a day  metoprolol succinate ER 50 milliGRAM(s) Oral daily  multivitamin 1 Tablet(s) Oral daily  spironolactone 25 milliGRAM(s) Oral daily  thiamine IVPB 100 milliGRAM(s) IV Intermittent daily    MEDICATIONS  (PRN):  acetaminophen     Tablet .. 650 milliGRAM(s) Oral every 6 hours PRN Mild Pain (1 - 3)  LORazepam   Injectable 2 milliGRAM(s) IV Push every 1 hour PRN CIWA 8-14  LORazepam   Injectable 4 milliGRAM(s) IV Push every 1 hour PRN CIWA 15 or greater, or seizure      ALLERGIES:  Allergies    No Known Allergies    Intolerances        LABS:                        14.8   6.53  )-----------( 280      ( 05 Mar 2025 00:24 )             44.0     03-05    139  |  107  |  17  ----------------------------<  127[H]  4.1   |  24  |  1.12    Ca    8.4      05 Mar 2025 00:24  Phos  3.0     03-05  Mg     2.3     03-05    TPro  6.4  /  Alb  3.4  /  TBili  0.3  /  DBili  x   /  AST  15  /  ALT  21  /  AlkPhos  60  03-05    PT/INR - ( 05 Mar 2025 00:24 )   PT: 12.1 sec;   INR: 1.06 ratio         PTT - ( 05 Mar 2025 00:24 )  PTT:27.6 sec  Urinalysis Basic - ( 05 Mar 2025 00:24 )    Color: x / Appearance: x / SG: x / pH: x  Gluc: 127 mg/dL / Ketone: x  / Bili: x / Urobili: x   Blood: x / Protein: x / Nitrite: x   Leuk Esterase: x / RBC: x / WBC x   Sq Epi: x / Non Sq Epi: x / Bacteria: x        RADIOLOGY & ADDITIONAL TESTS: Reviewed. INTERVAL HPI/OVERNIGHT EVENTS: Tolerating nasal cannula well    SUBJECTIVE: Patient seen and examined at bedside. Endorsing some pain during coughing, but otherwise no acute concerns.       VITAL SIGNS:  ICU Vital Signs Last 24 Hrs  T(C): 36.6 (05 Mar 2025 04:00), Max: 36.7 (04 Mar 2025 08:00)  T(F): 97.9 (05 Mar 2025 04:00), Max: 98 (04 Mar 2025 08:00)  HR: 80 (05 Mar 2025 07:00) (57 - 95)  BP: 116/73 (05 Mar 2025 07:00) (93/51 - 153/71)  BP(mean): 90 (05 Mar 2025 07:00) (68 - 100)  ABP: --  ABP(mean): --  RR: 12 (05 Mar 2025 07:00) (9 - 35)  SpO2: 96% (05 Mar 2025 07:00) (93% - 99%)    O2 Parameters below as of 05 Mar 2025 05:44  Patient On (Oxygen Delivery Method): nasal cannula, high flow            Plateau pressure:   P/F ratio:     03-04 @ 07:01  -  03-05 @ 07:00  --------------------------------------------------------  IN: 895 mL / OUT: 1850 mL / NET: -955 mL      CAPILLARY BLOOD GLUCOSE        ECG:  Physical Exam:   GENERAL: NAD, well-groomed, well-developed  HEAD:  Atraumatic, Normocephalic  EYES: EOMI, conjunctiva and sclera clear  ENMT: Moist mucous membranes  NECK: Supple  NERVOUS SYSTEM: AOX3, motor and sensation grossly intact in b/l UE and b/l LE  PSYCHIATRIC: Appropriate affect and mood  CHEST/LUNG: on nasal cannula, improved bilateral crackles  HEART: Regular rate and rhythm; No murmurs, rubs, or gallops. Trace LE edema  ABDOMEN: Soft, Nontender, Nondistended;  EXTREMITIES: No clubbing, cyanosis  SKIN: No rashes or     MEDICATIONS:  MEDICATIONS  (STANDING):  albuterol/ipratropium for Nebulization 3 milliLiter(s) Nebulizer every 6 hours  chlorhexidine 2% Cloths 1 Application(s) Topical <User Schedule>  enoxaparin Injectable 40 milliGRAM(s) SubCutaneous every 24 hours  folic acid 1 milliGRAM(s) Oral daily  lidocaine   4% Patch 1 Patch Transdermal every 24 hours  losartan 25 milliGRAM(s) Oral two times a day  metoprolol succinate ER 50 milliGRAM(s) Oral daily  multivitamin 1 Tablet(s) Oral daily  spironolactone 25 milliGRAM(s) Oral daily  thiamine IVPB 100 milliGRAM(s) IV Intermittent daily    MEDICATIONS  (PRN):  acetaminophen     Tablet .. 650 milliGRAM(s) Oral every 6 hours PRN Mild Pain (1 - 3)  LORazepam   Injectable 2 milliGRAM(s) IV Push every 1 hour PRN CIWA 8-14  LORazepam   Injectable 4 milliGRAM(s) IV Push every 1 hour PRN CIWA 15 or greater, or seizure      ALLERGIES:  Allergies    No Known Allergies    Intolerances        LABS:                        14.8   6.53  )-----------( 280      ( 05 Mar 2025 00:24 )             44.0     03-05    139  |  107  |  17  ----------------------------<  127[H]  4.1   |  24  |  1.12    Ca    8.4      05 Mar 2025 00:24  Phos  3.0     03-05  Mg     2.3     03-05    TPro  6.4  /  Alb  3.4  /  TBili  0.3  /  DBili  x   /  AST  15  /  ALT  21  /  AlkPhos  60  03-05    PT/INR - ( 05 Mar 2025 00:24 )   PT: 12.1 sec;   INR: 1.06 ratio         PTT - ( 05 Mar 2025 00:24 )  PTT:27.6 sec  Urinalysis Basic - ( 05 Mar 2025 00:24 )    Color: x / Appearance: x / SG: x / pH: x  Gluc: 127 mg/dL / Ketone: x  / Bili: x / Urobili: x   Blood: x / Protein: x / Nitrite: x   Leuk Esterase: x / RBC: x / WBC x   Sq Epi: x / Non Sq Epi: x / Bacteria: x        RADIOLOGY & ADDITIONAL TESTS: Reviewed.

## 2025-03-05 NOTE — CHART NOTE - [id number]_GEN_A_CORE
MICU Transfer Note    Transfer from: MICU  Transfer to:  (  ) Medicine    (X) Telemetry    (  ) RCU    (  ) Palliative    (  ) Stroke Unit    (  ) _______________  Accepting physician:      MICU COURSE:    Lori Torres is a 40-year-old male with a past medical history of NICM (EF 21% 07/24), HTN, JEFF presenting with a one day history of SOB and productive cough.   Last night, started having increasing SOB, a productive cough, and chest discomfort. It was getting worse, prompting him to the come to the ED. Also noticed diarrhea x1 day and possibly a fever at home. No sick contacts. Per EMR, has intermittent diuretic use and poor healthcare follow up. Discussing with patient, he was having difficulties taking all of his medications due to insurance coverage.     ED Course: On presentation, 82% on RA and tachypneic to 35, hypertensive to 159/100. Started on BiPAP for increased WOB and hypoxia. Given Lasix 40 mg IV x1. VBG with pH 7.31. RVP neg. CXR with pulmonary edema vs pneumonia.    While in the MICU, was on BiPAP and was then placed on AVAPs for increased respiratory rate. He was diuresed Lasix IV 40 mg and had good urine output. He was continued to be weaned and was weaned down to nasal cannula. While in the MICU,           ASSESSMENT & PLAN:         For Follow-Up:    [ ] follow up on heart failure recommendations      Vital Signs Last 24 Hrs  T(C): 36.6 (05 Mar 2025 08:00), Max: 36.6 (04 Mar 2025 12:00)  T(F): 97.9 (05 Mar 2025 08:00), Max: 97.9 (04 Mar 2025 16:00)  HR: 83 (05 Mar 2025 08:00) (57 - 95)  BP: 119/62 (05 Mar 2025 08:00) (93/51 - 153/71)  BP(mean): 84 (05 Mar 2025 08:00) (68 - 100)  RR: 29 (05 Mar 2025 08:00) (9 - 35)  SpO2: 95% (05 Mar 2025 08:00) (93% - 99%)    Parameters below as of 05 Mar 2025 08:00  Patient On (Oxygen Delivery Method): nasal cannula  O2 Flow (L/min): 4    I&O's Summary    04 Mar 2025 07:01  -  05 Mar 2025 07:00  --------------------------------------------------------  IN: 895 mL / OUT: 1850 mL / NET: -955 mL    05 Mar 2025 07:01  -  05 Mar 2025 08:20  --------------------------------------------------------  IN: 100 mL / OUT: 600 mL / NET: -500 mL          MEDICATIONS  (STANDING):  albuterol/ipratropium for Nebulization 3 milliLiter(s) Nebulizer every 6 hours  chlorhexidine 2% Cloths 1 Application(s) Topical <User Schedule>  enoxaparin Injectable 40 milliGRAM(s) SubCutaneous every 24 hours  folic acid 1 milliGRAM(s) Oral daily  lidocaine   4% Patch 1 Patch Transdermal every 24 hours  losartan 25 milliGRAM(s) Oral two times a day  metoprolol succinate ER 50 milliGRAM(s) Oral daily  multivitamin 1 Tablet(s) Oral daily  spironolactone 25 milliGRAM(s) Oral daily  thiamine IVPB 100 milliGRAM(s) IV Intermittent daily    MEDICATIONS  (PRN):  acetaminophen     Tablet .. 650 milliGRAM(s) Oral every 6 hours PRN Mild Pain (1 - 3)  LORazepam   Injectable 2 milliGRAM(s) IV Push every 1 hour PRN CIWA 8-14  LORazepam   Injectable 4 milliGRAM(s) IV Push every 1 hour PRN CIWA 15 or greater, or seizure        LABS                                            14.8                  Neurophils% (auto):   x      (03-05 @ 00:24):    6.53 )-----------(280          Lymphocytes% (auto):  x                                             44.0                   Eosinphils% (auto):   x        Manual%: Neutrophils x    ; Lymphocytes x    ; Eosinophils x    ; Bands%: x    ; Blasts x                                    139    |  107    |  17                  Calcium: 8.4   / iCa: x      (03-05 @ 00:24)    ----------------------------<  127       Magnesium: 2.3                              4.1     |  24     |  1.12             Phosphorous: 3.0      TPro  6.4    /  Alb  3.4    /  TBili  0.3    /  DBili  x      /  AST  15     /  ALT  21     /  AlkPhos  60     05 Mar 2025 00:24    ( 03-05 @ 00:24 )   PT: 12.1 sec;   INR: 1.06 ratio  aPTT: 27.6 sec MICU Transfer Note    Transfer from: MICU  Transfer to:  (  ) Medicine    (X) Telemetry    (  ) RCU    (  ) Palliative    (  ) Stroke Unit    (  ) _______________  Accepting physician:      MICU COURSE:    Lori Torres is a 40-year-old male with a past medical history of NICM (EF 21% 07/24), HTN, JEFF presenting with a one day history of SOB and productive cough.   Last night, started having increasing SOB, a productive cough, and chest discomfort. It was getting worse, prompting him to the come to the ED. Also noticed diarrhea x1 day and possibly a fever at home. No sick contacts. Per EMR, has intermittent diuretic use and poor healthcare follow up. Discussing with patient, he was having difficulties taking all of his medications due to insurance coverage.     ED Course: On presentation, 82% on RA and tachypneic to 35, hypertensive to 159/100. Started on BiPAP for increased WOB and hypoxia. Given Lasix 40 mg IV x1. VBG with pH 7.31. RVP neg. CXR with pulmonary edema vs pneumonia.    While in the MICU, was on BiPAP and was then placed on AVAPs for increased respiratory rate. He was diuresed and had good urine output. He was continued to be weaned and was weaned down to nasal cannula. While in the MICU, patient was restarted on home cardiac medications. Social work was consulted for assistance in with obtaining outpatient medications upon discharge.          ASSESSMENT & PLAN:   Lori Torres is a 40-year-old male with a past medical history of NICM (EF 21% 07/24), HTN, JEFF presenting with a one day history of SOB and productive cough concerning for heart failure exacerbation.    === NEUROLOGY ===  # No acute issues      === PULMONARY ===  # AHRF  - Etiology likely 2/2 CHF in the setting of poor medication access, with PNA also on differential though less likely  - No O2 at baseline, was on bipap/avaps -> HFNC -> weaned to NC  - CXR with bilateral patchy airspace opacities      === CARDIOVASCULAR ===  #ADHF  - BNP elevated to 1000 from previous around 400-500  - BRITT on exam and CXR with bilateral airspace opacities  - started bumex 1 mg daily  - losartan 50 mg BID    # NICM   - per HF in EMR, etiology could be 2/2 EtOH vs genetic  - TTE from 07/24: Left ventricular cavity is mildly dilated. EF 21%.  No regional wall motion abnormalities, but multiple segmental abnormalities. Mild (grade 1) left ventricular diastolic dysfunction. LA mildly dilated.      === RENAL ===  # No acute issues      === GASTROINTESTINAL ===  # Diarrhea  - a one day history of diarrhea - resolved      === INFECTIOUS DISEASE ===  # SIRS  - presenting tachypneic and hypoxic, meeting SIRS criteria  - CXR with bilateral airspace opacities, infectious vs pulmonary edema. Would favor cardiac source of pulmonary edema  - blood cultures x2: NGTD  - d/c zosyn  - RVP negative      === ENDOCRINE ===  # No acute issues      === HEME ===  # No acute issues      === FLUIDS/ELECTROLYTES/NUTRITION ===  # Diet: DASH/TLC  # Monitor, Replete to K>4 and Mg>2  # Transfuse for Hgb <7, Plt<10      === PROPHYLAXIS ===  # DVT: Lovenox      For Follow-Up:    [ ] follow up on heart failure recommendations  [ ] Will need to have good medication access before discharge  [ ] wean off NC as able  [ ] monitor I/Os      Vital Signs Last 24 Hrs  T(C): 36.6 (05 Mar 2025 08:00), Max: 36.6 (04 Mar 2025 12:00)  T(F): 97.9 (05 Mar 2025 08:00), Max: 97.9 (04 Mar 2025 16:00)  HR: 83 (05 Mar 2025 08:00) (57 - 95)  BP: 119/62 (05 Mar 2025 08:00) (93/51 - 153/71)  BP(mean): 84 (05 Mar 2025 08:00) (68 - 100)  RR: 29 (05 Mar 2025 08:00) (9 - 35)  SpO2: 95% (05 Mar 2025 08:00) (93% - 99%)    Parameters below as of 05 Mar 2025 08:00  Patient On (Oxygen Delivery Method): nasal cannula  O2 Flow (L/min): 4    I&O's Summary    04 Mar 2025 07:01  -  05 Mar 2025 07:00  --------------------------------------------------------  IN: 895 mL / OUT: 1850 mL / NET: -955 mL    05 Mar 2025 07:01  -  05 Mar 2025 08:20  --------------------------------------------------------  IN: 100 mL / OUT: 600 mL / NET: -500 mL          MEDICATIONS  (STANDING):  albuterol/ipratropium for Nebulization 3 milliLiter(s) Nebulizer every 6 hours  chlorhexidine 2% Cloths 1 Application(s) Topical <User Schedule>  enoxaparin Injectable 40 milliGRAM(s) SubCutaneous every 24 hours  folic acid 1 milliGRAM(s) Oral daily  lidocaine   4% Patch 1 Patch Transdermal every 24 hours  losartan 25 milliGRAM(s) Oral two times a day  metoprolol succinate ER 50 milliGRAM(s) Oral daily  multivitamin 1 Tablet(s) Oral daily  spironolactone 25 milliGRAM(s) Oral daily  thiamine IVPB 100 milliGRAM(s) IV Intermittent daily    MEDICATIONS  (PRN):  acetaminophen     Tablet .. 650 milliGRAM(s) Oral every 6 hours PRN Mild Pain (1 - 3)  LORazepam   Injectable 2 milliGRAM(s) IV Push every 1 hour PRN CIWA 8-14  LORazepam   Injectable 4 milliGRAM(s) IV Push every 1 hour PRN CIWA 15 or greater, or seizure        LABS                                            14.8                  Neurophils% (auto):   x      (03-05 @ 00:24):    6.53 )-----------(280          Lymphocytes% (auto):  x                                             44.0                   Eosinphils% (auto):   x        Manual%: Neutrophils x    ; Lymphocytes x    ; Eosinophils x    ; Bands%: x    ; Blasts x                                    139    |  107    |  17                  Calcium: 8.4   / iCa: x      (03-05 @ 00:24)    ----------------------------<  127       Magnesium: 2.3                              4.1     |  24     |  1.12             Phosphorous: 3.0      TPro  6.4    /  Alb  3.4    /  TBili  0.3    /  DBili  x      /  AST  15     /  ALT  21     /  AlkPhos  60     05 Mar 2025 00:24    ( 03-05 @ 00:24 )   PT: 12.1 sec;   INR: 1.06 ratio  aPTT: 27.6 sec MICU Transfer Note    Transfer from: MICU  Transfer to:  (  ) Medicine    (X) Telemetry    (  ) RCU    (  ) Palliative    (  ) Stroke Unit    (  ) _______________  Accepting physician: Dr. Quesada      MICU COURSE:    Lori Torres is a 40-year-old male with a past medical history of NICM (EF 21% 07/24), HTN, JEFF presenting with a one day history of SOB and productive cough.   Last night, started having increasing SOB, a productive cough, and chest discomfort. It was getting worse, prompting him to the come to the ED. Also noticed diarrhea x1 day and possibly a fever at home. No sick contacts. Per EMR, has intermittent diuretic use and poor healthcare follow up. Discussing with patient, he was having difficulties taking all of his medications due to insurance coverage.     ED Course: On presentation, 82% on RA and tachypneic to 35, hypertensive to 159/100. Started on BiPAP for increased WOB and hypoxia. Given Lasix 40 mg IV x1. VBG with pH 7.31. RVP neg. CXR with pulmonary edema vs pneumonia.    While in the MICU, was on BiPAP and was then placed on AVAPs for increased respiratory rate. He was diuresed and had good urine output. He was continued to be weaned and was weaned down to nasal cannula. While in the MICU, patient was restarted on home cardiac medications. Social work was consulted for assistance in with obtaining outpatient medications upon discharge.          ASSESSMENT & PLAN:   Lori Torres is a 40-year-old male with a past medical history of NICM (EF 21% 07/24), HTN, JEFF presenting with a one day history of SOB and productive cough concerning for heart failure exacerbation.    === NEUROLOGY ===  # No acute issues      === PULMONARY ===  # AHRF  - Etiology likely 2/2 CHF in the setting of poor medication access, with PNA also on differential though less likely  - No O2 at baseline, was on bipap/avaps -> HFNC -> weaned to NC  - CXR with bilateral patchy airspace opacities      === CARDIOVASCULAR ===  #ADHF  - BNP elevated to 1000 from previous around 400-500  - BRITT on exam and CXR with bilateral airspace opacities  - started bumex 1 mg daily  - losartan 50 mg BID    # NICM   - per HF in EMR, etiology could be 2/2 EtOH vs genetic  - TTE from 07/24: Left ventricular cavity is mildly dilated. EF 21%.  No regional wall motion abnormalities, but multiple segmental abnormalities. Mild (grade 1) left ventricular diastolic dysfunction. LA mildly dilated.      === RENAL ===  # No acute issues      === GASTROINTESTINAL ===  # Diarrhea  - a one day history of diarrhea - resolved      === INFECTIOUS DISEASE ===  # SIRS  - presenting tachypneic and hypoxic, meeting SIRS criteria  - CXR with bilateral airspace opacities, infectious vs pulmonary edema. Would favor cardiac source of pulmonary edema  - blood cultures x2: NGTD  - d/c zosyn  - RVP negative      === ENDOCRINE ===  # No acute issues      === HEME ===  # No acute issues      === FLUIDS/ELECTROLYTES/NUTRITION ===  # Diet: DASH/TLC  # Monitor, Replete to K>4 and Mg>2  # Transfuse for Hgb <7, Plt<10      === PROPHYLAXIS ===  # DVT: Lovenox      For Follow-Up:    [ ] follow up on heart failure recommendations  [ ] Will need to have good medication access before discharge  [ ] wean off NC as able  [ ] monitor I/Os      Vital Signs Last 24 Hrs  T(C): 36.6 (05 Mar 2025 08:00), Max: 36.6 (04 Mar 2025 12:00)  T(F): 97.9 (05 Mar 2025 08:00), Max: 97.9 (04 Mar 2025 16:00)  HR: 83 (05 Mar 2025 08:00) (57 - 95)  BP: 119/62 (05 Mar 2025 08:00) (93/51 - 153/71)  BP(mean): 84 (05 Mar 2025 08:00) (68 - 100)  RR: 29 (05 Mar 2025 08:00) (9 - 35)  SpO2: 95% (05 Mar 2025 08:00) (93% - 99%)    Parameters below as of 05 Mar 2025 08:00  Patient On (Oxygen Delivery Method): nasal cannula  O2 Flow (L/min): 4    I&O's Summary    04 Mar 2025 07:01  -  05 Mar 2025 07:00  --------------------------------------------------------  IN: 895 mL / OUT: 1850 mL / NET: -955 mL    05 Mar 2025 07:01  -  05 Mar 2025 08:20  --------------------------------------------------------  IN: 100 mL / OUT: 600 mL / NET: -500 mL          MEDICATIONS  (STANDING):  albuterol/ipratropium for Nebulization 3 milliLiter(s) Nebulizer every 6 hours  chlorhexidine 2% Cloths 1 Application(s) Topical <User Schedule>  enoxaparin Injectable 40 milliGRAM(s) SubCutaneous every 24 hours  folic acid 1 milliGRAM(s) Oral daily  lidocaine   4% Patch 1 Patch Transdermal every 24 hours  losartan 25 milliGRAM(s) Oral two times a day  metoprolol succinate ER 50 milliGRAM(s) Oral daily  multivitamin 1 Tablet(s) Oral daily  spironolactone 25 milliGRAM(s) Oral daily  thiamine IVPB 100 milliGRAM(s) IV Intermittent daily    MEDICATIONS  (PRN):  acetaminophen     Tablet .. 650 milliGRAM(s) Oral every 6 hours PRN Mild Pain (1 - 3)  LORazepam   Injectable 2 milliGRAM(s) IV Push every 1 hour PRN CIWA 8-14  LORazepam   Injectable 4 milliGRAM(s) IV Push every 1 hour PRN CIWA 15 or greater, or seizure        LABS                                            14.8                  Neurophils% (auto):   x      (03-05 @ 00:24):    6.53 )-----------(280          Lymphocytes% (auto):  x                                             44.0                   Eosinphils% (auto):   x        Manual%: Neutrophils x    ; Lymphocytes x    ; Eosinophils x    ; Bands%: x    ; Blasts x                                    139    |  107    |  17                  Calcium: 8.4   / iCa: x      (03-05 @ 00:24)    ----------------------------<  127       Magnesium: 2.3                              4.1     |  24     |  1.12             Phosphorous: 3.0      TPro  6.4    /  Alb  3.4    /  TBili  0.3    /  DBili  x      /  AST  15     /  ALT  21     /  AlkPhos  60     05 Mar 2025 00:24    ( 03-05 @ 00:24 )   PT: 12.1 sec;   INR: 1.06 ratio  aPTT: 27.6 sec MICU Transfer Note    Transfer from: MICU  Transfer to:  (  ) Medicine    (X) Telemetry    (  ) RCU    (  ) Palliative    (  ) Stroke Unit    (  ) _______________  Accepting physician: Dr. Quesada      MICU COURSE:    Lori Torres is a 40-year-old male with a past medical history of NICM (EF 21% 07/24), HTN, JEFF presenting with a one day history of SOB and productive cough.   Last night, started having increasing SOB, a productive cough, and chest discomfort. It was getting worse, prompting him to the come to the ED. Also noticed diarrhea x1 day and possibly a fever at home. No sick contacts. Per EMR, has intermittent diuretic use and poor healthcare follow up. Discussing with patient, he was having difficulties taking all of his medications due to insurance coverage.     ED Course: On presentation, 82% on RA and tachypneic to 35, hypertensive to 159/100. Started on BiPAP for increased WOB and hypoxia. Given Lasix 40 mg IV x1. VBG with pH 7.31. RVP neg. CXR with pulmonary edema vs pneumonia.    While in the MICU, was on BiPAP and was then placed on AVAPs for increased respiratory rate. He was diuresed and had good urine output. He was continued to be weaned and was weaned down to nasal cannula. While in the MICU, patient was restarted on home cardiac medications. Social work was consulted for assistance in with obtaining outpatient medications upon discharge.          ASSESSMENT & PLAN:   Lori Torres is a 40-year-old male with a past medical history of NICM (EF 21% 07/24), HTN, JEFF presenting with a one day history of SOB and productive cough concerning for heart failure exacerbation.    === NEUROLOGY ===  # No acute issues      === PULMONARY ===  # AHRF  - Etiology likely 2/2 CHF in the setting of poor medication access, with PNA also on differential though less likely  - No O2 at baseline, was on bipap/avaps -> HFNC -> weaned to NC  - CXR with bilateral patchy airspace opacities      === CARDIOVASCULAR ===  #ADHF  - BNP elevated to 1000 from previous around 400-500  - BRITT on exam and CXR with bilateral airspace opacities  - started bumex 1 mg daily  - losartan 50 mg BID    # NICM   - per HF in EMR, etiology could be 2/2 EtOH vs genetic  - TTE from 07/24: Left ventricular cavity is mildly dilated. EF 21%.  No regional wall motion abnormalities, but multiple segmental abnormalities. Mild (grade 1) left ventricular diastolic dysfunction. LA mildly dilated.      === RENAL ===  # No acute issues      === GASTROINTESTINAL ===  # Diarrhea  - a one day history of diarrhea - resolved      === INFECTIOUS DISEASE ===  # SIRS  - presenting tachypneic and hypoxic, meeting SIRS criteria  - CXR with bilateral airspace opacities, infectious vs pulmonary edema. Would favor cardiac source of pulmonary edema  - blood cultures x2: NGTD  - d/c zosyn  - RVP negative      === ENDOCRINE ===  # No acute issues      === HEME ===  # No acute issues      === FLUIDS/ELECTROLYTES/NUTRITION ===  # Diet: DASH/TLC  # Monitor, Replete to K>4 and Mg>2  # Transfuse for Hgb <7, Plt<10      === PROPHYLAXIS ===  # DVT: Lovenox      For Follow-Up:    [ ] consider heart failure consult  [ ] Will need to have good medication access before discharge  [ ] wean off NC as able  [ ] monitor I/Os      Vital Signs Last 24 Hrs  T(C): 36.6 (05 Mar 2025 08:00), Max: 36.6 (04 Mar 2025 12:00)  T(F): 97.9 (05 Mar 2025 08:00), Max: 97.9 (04 Mar 2025 16:00)  HR: 83 (05 Mar 2025 08:00) (57 - 95)  BP: 119/62 (05 Mar 2025 08:00) (93/51 - 153/71)  BP(mean): 84 (05 Mar 2025 08:00) (68 - 100)  RR: 29 (05 Mar 2025 08:00) (9 - 35)  SpO2: 95% (05 Mar 2025 08:00) (93% - 99%)    Parameters below as of 05 Mar 2025 08:00  Patient On (Oxygen Delivery Method): nasal cannula  O2 Flow (L/min): 4    I&O's Summary    04 Mar 2025 07:01  -  05 Mar 2025 07:00  --------------------------------------------------------  IN: 895 mL / OUT: 1850 mL / NET: -955 mL    05 Mar 2025 07:01  -  05 Mar 2025 08:20  --------------------------------------------------------  IN: 100 mL / OUT: 600 mL / NET: -500 mL          MEDICATIONS  (STANDING):  albuterol/ipratropium for Nebulization 3 milliLiter(s) Nebulizer every 6 hours  chlorhexidine 2% Cloths 1 Application(s) Topical <User Schedule>  enoxaparin Injectable 40 milliGRAM(s) SubCutaneous every 24 hours  folic acid 1 milliGRAM(s) Oral daily  lidocaine   4% Patch 1 Patch Transdermal every 24 hours  losartan 25 milliGRAM(s) Oral two times a day  metoprolol succinate ER 50 milliGRAM(s) Oral daily  multivitamin 1 Tablet(s) Oral daily  spironolactone 25 milliGRAM(s) Oral daily  thiamine IVPB 100 milliGRAM(s) IV Intermittent daily    MEDICATIONS  (PRN):  acetaminophen     Tablet .. 650 milliGRAM(s) Oral every 6 hours PRN Mild Pain (1 - 3)  LORazepam   Injectable 2 milliGRAM(s) IV Push every 1 hour PRN CIWA 8-14  LORazepam   Injectable 4 milliGRAM(s) IV Push every 1 hour PRN CIWA 15 or greater, or seizure        LABS                                            14.8                  Neurophils% (auto):   x      (03-05 @ 00:24):    6.53 )-----------(280          Lymphocytes% (auto):  x                                             44.0                   Eosinphils% (auto):   x        Manual%: Neutrophils x    ; Lymphocytes x    ; Eosinophils x    ; Bands%: x    ; Blasts x                                    139    |  107    |  17                  Calcium: 8.4   / iCa: x      (03-05 @ 00:24)    ----------------------------<  127       Magnesium: 2.3                              4.1     |  24     |  1.12             Phosphorous: 3.0      TPro  6.4    /  Alb  3.4    /  TBili  0.3    /  DBili  x      /  AST  15     /  ALT  21     /  AlkPhos  60     05 Mar 2025 00:24    ( 03-05 @ 00:24 )   PT: 12.1 sec;   INR: 1.06 ratio  aPTT: 27.6 sec MICU Transfer Note    Transfer from: MICU  Transfer to:  (  ) Medicine    (X) Telemetry    (  ) RCU    (  ) Palliative    (  ) Stroke Unit    (  ) _______________  Accepting physician: Dr. Quesada      MICU COURSE:    Lori Torres is a 40-year-old male with a past medical history of NICM (EF 21% 07/24), HTN, JEFF presenting with a one day history of SOB and productive cough.   Last night, started having increasing SOB, a productive cough, and chest discomfort. It was getting worse, prompting him to the come to the ED. Also noticed diarrhea x1 day and possibly a fever at home. No sick contacts. Per EMR, has intermittent diuretic use and poor healthcare follow up. Discussing with patient, he was having difficulties taking all of his medications due to insurance coverage.     ED Course: On presentation, 82% on RA and tachypneic to 35, hypertensive to 159/100. Started on BiPAP for increased WOB and hypoxia. Given Lasix 40 mg IV x1. VBG with pH 7.31. RVP neg. CXR with pulmonary edema vs pneumonia.    While in the MICU, was on BiPAP and was then placed on AVAPs for increased respiratory rate. He was diuresed and had good urine output. He was continued to be weaned and was weaned down to nasal cannula. While in the MICU, patient was restarted on home cardiac medications. Social work was consulted for assistance in with obtaining outpatient medications upon discharge.          ASSESSMENT & PLAN:   Lori Torres is a 40-year-old male with a past medical history of NICM (EF 21% 07/24), HTN, JEFF presenting with a one day history of SOB and productive cough concerning for heart failure exacerbation.    === NEUROLOGY ===  # No acute issues      === PULMONARY ===  # AHRF  - Etiology likely 2/2 CHF in the setting of poor medication access, with PNA also on differential though less likely  - No O2 at baseline, was on bipap/avaps -> HFNC -> weaned to NC  - CXR with bilateral patchy airspace opacities      === CARDIOVASCULAR ===  #ADHF  - BNP elevated to 1000 from previous around 400-500  - BRITT on exam and CXR with bilateral airspace opacities  - started bumex 1 mg daily  - losartan 50 mg BID    # NICM   - per HF in EMR, etiology could be 2/2 EtOH vs genetic  - TTE from 07/24: Left ventricular cavity is mildly dilated. EF 21%.  No regional wall motion abnormalities, but multiple segmental abnormalities. Mild (grade 1) left ventricular diastolic dysfunction. LA mildly dilated.      === RENAL ===  # No acute issues      === GASTROINTESTINAL ===  # Diarrhea  - a one day history of diarrhea - resolved      === INFECTIOUS DISEASE ===  # SIRS  - presenting tachypneic and hypoxic, meeting SIRS criteria  - CXR with bilateral airspace opacities, infectious vs pulmonary edema. Would favor cardiac source of pulmonary edema  - blood cultures x2: NGTD  - d/c zosyn  - RVP negative      === ENDOCRINE ===  # No acute issues      === HEME ===  # No acute issues      === FLUIDS/ELECTROLYTES/NUTRITION ===  # Diet: DASH/TLC  # Monitor, Replete to K>4 and Mg>2  # Transfuse for Hgb <7, Plt<10      === PROPHYLAXIS ===  # DVT: Lovenox      For Follow-Up:    [ ] consider heart failure consult  [ ] Will need to have good medication access before discharge  [ ] wean off NC as able  [ ] monitor I/Os  [ ] CPAP/BiPAP at night for JEFF      Vital Signs Last 24 Hrs  T(C): 36.6 (05 Mar 2025 08:00), Max: 36.6 (04 Mar 2025 12:00)  T(F): 97.9 (05 Mar 2025 08:00), Max: 97.9 (04 Mar 2025 16:00)  HR: 83 (05 Mar 2025 08:00) (57 - 95)  BP: 119/62 (05 Mar 2025 08:00) (93/51 - 153/71)  BP(mean): 84 (05 Mar 2025 08:00) (68 - 100)  RR: 29 (05 Mar 2025 08:00) (9 - 35)  SpO2: 95% (05 Mar 2025 08:00) (93% - 99%)    Parameters below as of 05 Mar 2025 08:00  Patient On (Oxygen Delivery Method): nasal cannula  O2 Flow (L/min): 4    I&O's Summary    04 Mar 2025 07:01  -  05 Mar 2025 07:00  --------------------------------------------------------  IN: 895 mL / OUT: 1850 mL / NET: -955 mL    05 Mar 2025 07:01  -  05 Mar 2025 08:20  --------------------------------------------------------  IN: 100 mL / OUT: 600 mL / NET: -500 mL          MEDICATIONS  (STANDING):  albuterol/ipratropium for Nebulization 3 milliLiter(s) Nebulizer every 6 hours  chlorhexidine 2% Cloths 1 Application(s) Topical <User Schedule>  enoxaparin Injectable 40 milliGRAM(s) SubCutaneous every 24 hours  folic acid 1 milliGRAM(s) Oral daily  lidocaine   4% Patch 1 Patch Transdermal every 24 hours  losartan 25 milliGRAM(s) Oral two times a day  metoprolol succinate ER 50 milliGRAM(s) Oral daily  multivitamin 1 Tablet(s) Oral daily  spironolactone 25 milliGRAM(s) Oral daily  thiamine IVPB 100 milliGRAM(s) IV Intermittent daily    MEDICATIONS  (PRN):  acetaminophen     Tablet .. 650 milliGRAM(s) Oral every 6 hours PRN Mild Pain (1 - 3)  LORazepam   Injectable 2 milliGRAM(s) IV Push every 1 hour PRN CIWA 8-14  LORazepam   Injectable 4 milliGRAM(s) IV Push every 1 hour PRN CIWA 15 or greater, or seizure        LABS                                            14.8                  Neurophils% (auto):   x      (03-05 @ 00:24):    6.53 )-----------(280          Lymphocytes% (auto):  x                                             44.0                   Eosinphils% (auto):   x        Manual%: Neutrophils x    ; Lymphocytes x    ; Eosinophils x    ; Bands%: x    ; Blasts x                                    139    |  107    |  17                  Calcium: 8.4   / iCa: x      (03-05 @ 00:24)    ----------------------------<  127       Magnesium: 2.3                              4.1     |  24     |  1.12             Phosphorous: 3.0      TPro  6.4    /  Alb  3.4    /  TBili  0.3    /  DBili  x      /  AST  15     /  ALT  21     /  AlkPhos  60     05 Mar 2025 00:24    ( 03-05 @ 00:24 )   PT: 12.1 sec;   INR: 1.06 ratio  aPTT: 27.6 sec

## 2025-03-06 VITALS
SYSTOLIC BLOOD PRESSURE: 113 MMHG | HEART RATE: 80 BPM | TEMPERATURE: 97 F | RESPIRATION RATE: 18 BRPM | DIASTOLIC BLOOD PRESSURE: 74 MMHG | OXYGEN SATURATION: 98 %

## 2025-03-06 PROCEDURE — 97116 GAIT TRAINING THERAPY: CPT

## 2025-03-06 PROCEDURE — 87040 BLOOD CULTURE FOR BACTERIA: CPT

## 2025-03-06 PROCEDURE — 87449 NOS EACH ORGANISM AG IA: CPT

## 2025-03-06 PROCEDURE — 87637 SARSCOV2&INF A&B&RSV AMP PRB: CPT

## 2025-03-06 PROCEDURE — 85025 COMPLETE CBC W/AUTO DIFF WBC: CPT

## 2025-03-06 PROCEDURE — 36415 COLL VENOUS BLD VENIPUNCTURE: CPT

## 2025-03-06 PROCEDURE — 82803 BLOOD GASES ANY COMBINATION: CPT

## 2025-03-06 PROCEDURE — 82947 ASSAY GLUCOSE BLOOD QUANT: CPT

## 2025-03-06 PROCEDURE — 99285 EMERGENCY DEPT VISIT HI MDM: CPT

## 2025-03-06 PROCEDURE — 97161 PT EVAL LOW COMPLEX 20 MIN: CPT

## 2025-03-06 PROCEDURE — 83605 ASSAY OF LACTIC ACID: CPT

## 2025-03-06 PROCEDURE — 87899 AGENT NOS ASSAY W/OPTIC: CPT

## 2025-03-06 PROCEDURE — 85014 HEMATOCRIT: CPT

## 2025-03-06 PROCEDURE — 97165 OT EVAL LOW COMPLEX 30 MIN: CPT

## 2025-03-06 PROCEDURE — 82435 ASSAY OF BLOOD CHLORIDE: CPT

## 2025-03-06 PROCEDURE — 84145 PROCALCITONIN (PCT): CPT

## 2025-03-06 PROCEDURE — 83735 ASSAY OF MAGNESIUM: CPT

## 2025-03-06 PROCEDURE — 82330 ASSAY OF CALCIUM: CPT

## 2025-03-06 PROCEDURE — 84295 ASSAY OF SERUM SODIUM: CPT

## 2025-03-06 PROCEDURE — 83880 ASSAY OF NATRIURETIC PEPTIDE: CPT

## 2025-03-06 PROCEDURE — 85018 HEMOGLOBIN: CPT

## 2025-03-06 PROCEDURE — 94640 AIRWAY INHALATION TREATMENT: CPT

## 2025-03-06 PROCEDURE — 84484 ASSAY OF TROPONIN QUANT: CPT

## 2025-03-06 PROCEDURE — 71045 X-RAY EXAM CHEST 1 VIEW: CPT

## 2025-03-06 PROCEDURE — 85610 PROTHROMBIN TIME: CPT

## 2025-03-06 PROCEDURE — 97530 THERAPEUTIC ACTIVITIES: CPT

## 2025-03-06 PROCEDURE — 84100 ASSAY OF PHOSPHORUS: CPT

## 2025-03-06 PROCEDURE — 84132 ASSAY OF SERUM POTASSIUM: CPT

## 2025-03-06 PROCEDURE — 96375 TX/PRO/DX INJ NEW DRUG ADDON: CPT

## 2025-03-06 PROCEDURE — 96374 THER/PROPH/DIAG INJ IV PUSH: CPT

## 2025-03-06 PROCEDURE — 94660 CPAP INITIATION&MGMT: CPT

## 2025-03-06 PROCEDURE — 80053 COMPREHEN METABOLIC PANEL: CPT

## 2025-03-06 PROCEDURE — 99239 HOSP IP/OBS DSCHRG MGMT >30: CPT

## 2025-03-06 PROCEDURE — 85730 THROMBOPLASTIN TIME PARTIAL: CPT

## 2025-03-06 PROCEDURE — 0225U NFCT DS DNA&RNA 21 SARSCOV2: CPT

## 2025-03-06 RX ORDER — LOSARTAN POTASSIUM 100 MG/1
1 TABLET, FILM COATED ORAL
Qty: 120 | Refills: 0
Start: 2025-03-06 | End: 2025-05-04

## 2025-03-06 RX ORDER — BUMETANIDE 1 MG/1
1 TABLET ORAL
Qty: 60 | Refills: 0
Start: 2025-03-06 | End: 2025-05-04

## 2025-03-06 RX ORDER — FUROSEMIDE 10 MG/ML
1 INJECTION INTRAMUSCULAR; INTRAVENOUS
Qty: 60 | Refills: 0
Start: 2025-03-06 | End: 2025-05-04

## 2025-03-06 RX ORDER — METOPROLOL SUCCINATE 50 MG/1
1 TABLET, EXTENDED RELEASE ORAL
Qty: 60 | Refills: 0
Start: 2025-03-06 | End: 2025-05-04

## 2025-03-06 RX ORDER — LOSARTAN POTASSIUM 100 MG/1
1 TABLET, FILM COATED ORAL
Refills: 0 | DISCHARGE

## 2025-03-06 RX ORDER — FUROSEMIDE 10 MG/ML
1 INJECTION INTRAMUSCULAR; INTRAVENOUS
Qty: 60 | Refills: 0
Start: 2025-03-06 | End: 2025-07-01

## 2025-03-06 RX ORDER — NICOTINE POLACRILEX 4 MG/1
1 GUM, CHEWING ORAL
Qty: 2 | Refills: 0
Start: 2025-03-06 | End: 2025-06-01

## 2025-03-06 RX ORDER — SPIRONOLACTONE 25 MG
1 TABLET ORAL
Qty: 60 | Refills: 0
Start: 2025-03-06 | End: 2025-05-04

## 2025-03-06 RX ORDER — NICOTINE POLACRILEX 4 MG/1
1 GUM, CHEWING ORAL
Qty: 2 | Refills: 0
Start: 2025-03-06 | End: 2025-04-04

## 2025-03-06 RX ORDER — SPIRONOLACTONE 25 MG
1 TABLET ORAL
Qty: 60 | Refills: 0
Start: 2025-03-06 | End: 2025-07-01

## 2025-03-06 RX ORDER — METOPROLOL SUCCINATE 50 MG/1
1 TABLET, EXTENDED RELEASE ORAL
Qty: 60 | Refills: 0
Start: 2025-03-06 | End: 2025-07-01

## 2025-03-06 RX ADMIN — LIDOCAINE HYDROCHLORIDE 1 PATCH: 20 JELLY TOPICAL at 17:57

## 2025-03-06 RX ADMIN — Medication 25 MILLIGRAM(S): at 05:52

## 2025-03-06 RX ADMIN — IPRATROPIUM BROMIDE AND ALBUTEROL SULFATE 3 MILLILITER(S): .5; 2.5 SOLUTION RESPIRATORY (INHALATION) at 12:24

## 2025-03-06 RX ADMIN — IPRATROPIUM BROMIDE AND ALBUTEROL SULFATE 3 MILLILITER(S): .5; 2.5 SOLUTION RESPIRATORY (INHALATION) at 05:49

## 2025-03-06 RX ADMIN — LIDOCAINE HYDROCHLORIDE 1 PATCH: 20 JELLY TOPICAL at 06:57

## 2025-03-06 RX ADMIN — NICOTINE POLACRILEX 1 PATCH: 4 GUM, CHEWING ORAL at 12:37

## 2025-03-06 RX ADMIN — NICOTINE POLACRILEX 1 PATCH: 4 GUM, CHEWING ORAL at 12:22

## 2025-03-06 RX ADMIN — LOSARTAN POTASSIUM 50 MILLIGRAM(S): 100 TABLET, FILM COATED ORAL at 05:49

## 2025-03-06 RX ADMIN — LIDOCAINE HYDROCHLORIDE 1 PATCH: 20 JELLY TOPICAL at 04:19

## 2025-03-06 RX ADMIN — BUMETANIDE 1 MILLIGRAM(S): 1 TABLET ORAL at 05:50

## 2025-03-06 RX ADMIN — IPRATROPIUM BROMIDE AND ALBUTEROL SULFATE 3 MILLILITER(S): .5; 2.5 SOLUTION RESPIRATORY (INHALATION) at 00:53

## 2025-03-06 RX ADMIN — METOPROLOL SUCCINATE 50 MILLIGRAM(S): 50 TABLET, EXTENDED RELEASE ORAL at 05:49

## 2025-03-06 RX ADMIN — NICOTINE POLACRILEX 1 PATCH: 4 GUM, CHEWING ORAL at 12:36

## 2025-03-06 RX ADMIN — LOSARTAN POTASSIUM 50 MILLIGRAM(S): 100 TABLET, FILM COATED ORAL at 17:57

## 2025-03-06 NOTE — DISCHARGE NOTE PROVIDER - ATTENDING DISCHARGE PHYSICAL EXAMINATION:
CONSTITUTIONAL: NAD  EYES: EOMI; conjunctiva and sclera clear  ENMT: Moist oral mucosa  RESPIRATORY: Normal respiratory effort; lungs are clear to auscultation bilaterally  CARDIOVASCULAR: RRR, no murmurs, no LE edema  ABDOMEN: Nontender to palpation, nondistended  PSYCH: A+O to person, place, and time; affect appropriate  NEUROLOGY: no FND

## 2025-03-06 NOTE — DISCHARGE NOTE NURSING/CASE MANAGEMENT/SOCIAL WORK - NSDCPEEMAIL_GEN_ALL_CORE
[facility] for Tobacco Control email [email or web address]

## 2025-03-06 NOTE — PROGRESS NOTE ADULT - PROBLEM SELECTOR PLAN 1
Admitted requiring NIPPV and IV diuretics for heart failure, now stable on room air and PO diuretics  Reported inability to refill medications  HFrEF (EF 21% in 2024)  Guideline directed medical therapy as below:   - Diuretic: Bumex 1mg qd  - BB:  Toprol 50mg qd  - ARNI/ACE-I/ARB: losartan 50mg bid  - MRA: 25mg qd  - SGLT2i: initiate prior to dc
Admitted requiring NIPPV and IV diuretics for heart failure, now stable on room air and PO diuretics  Reported inability to refill medications  HFrEF (EF 21% in 2024)  Guideline directed medical therapy as below:   - Diuretic: Bumex 1mg qd  - BB:  Toprol 50mg qd  - ARNI/ACE-I/ARB: losartan 50mg bid  - MRA: 25mg qd  - SGLT2i: uninsured, start outpatient if qualifies for med assistance
Yes

## 2025-03-06 NOTE — DISCHARGE NOTE PROVIDER - NSDCMRMEDTOKEN_GEN_ALL_CORE_FT
bumetanide 1 mg oral tablet: 1 tab(s) orally once a day  losartan 25 mg oral tablet: 1 tab(s) orally 2 times a day  metoprolol succinate 50 mg oral tablet, extended release: 1 tab(s) orally once a day  spironolactone 25 mg oral tablet: 1 tab(s) orally once a day   Lasix 40 mg oral tablet: 1 tab(s) orally once a day  metoprolol succinate 50 mg oral tablet, extended release: 1 tab(s) orally once a day  spironolactone 25 mg oral tablet: 1 tab(s) orally once a day  valsartan 80 mg oral tablet: 1 tab(s) orally once a day

## 2025-03-06 NOTE — PROGRESS NOTE ADULT - PROBLEM SELECTOR PROBLEM 5
Received another prior authorization request for patients testosterone cypionate (DEPO-TESTOSTERONE) 100 MG/ML injectable solution. Please advise.        Ciro Sow  [phone]  
Tobacco use disorder
Tobacco use disorder

## 2025-03-06 NOTE — DISCHARGE NOTE PROVIDER - HOSPITAL COURSE
HPI:  Lori Torres is a 40-year-old male with a past medical history of NICM (EF 21% 07/24), HTN, JEFF presenting with a one day history of SOB and productive cough.   Last night, started having increasing SOB, a productive cough, and chest discomfort. It was getting worse, prompting him to the come to the ED. Also noticed diarrhea x1 day and possibly a fever at home. No sick contacts. Per EMR, has intermittent diuretic use and poor healthcare follow up. Discussing with patient, he was having difficulties taking all of his medications due to insurance coverage.     ED Course: On presentation, 82% on RA and tachypneic to 35, hypertensive to 159/100. Started on BiPAP for increased WOB and hypoxia. Given Lasix 40 mg IV x1. VBG with pH 7.31. RVP neg. CXR with pulmonary edema vs pneumonia. (03 Mar 2025 12:30)    Hospital Course:  He was initially admitted to MICU for IV diuresis given severe dyspnea requiring BIPAP support. His clinical status improved with IV diuresis, and he was weaned to RA. Now on PO diuretics. He is medically stable for discharge home with outpatient PCP and cardiology follow up.    Important Medication Changes and Reason:  None. Resume home meds.    Active or Pending Issues Requiring Follow-up:  NICM, HFrEF    Advanced Directives:   [x] Full code  [ ] DNR  [ ] Hospice    Discharge Diagnoses:  CHF exacerbation  HFrEF       HPI:  Lori Torrse is a 40-year-old male with a past medical history of NICM (EF 21% 07/24), HTN, JEFF presenting with a one day history of SOB and productive cough.   Last night, started having increasing SOB, a productive cough, and chest discomfort. It was getting worse, prompting him to the come to the ED. Also noticed diarrhea x1 day and possibly a fever at home. No sick contacts. Per EMR, has intermittent diuretic use and poor healthcare follow up. Discussing with patient, he was having difficulties taking all of his medications due to insurance coverage.     ED Course: On presentation, 82% on RA and tachypneic to 35, hypertensive to 159/100. Started on BiPAP for increased WOB and hypoxia. Given Lasix 40 mg IV x1. VBG with pH 7.31. RVP neg. CXR with pulmonary edema vs pneumonia. (03 Mar 2025 12:30)    Hospital Course:  He was initially admitted to MICU for IV diuresis given severe dyspnea requiring BIPAP support. His clinical status improved with IV diuresis, and he was weaned to RA. Now on PO diuretics. He is medically stable for discharge home with outpatient PCP and cardiology follow up.    Important Medication Changes and Reason:  Losartan 25 BID switched to Valsartan 80 mg daily (equivalent)  Bumex 1 mg PO daily switched to lasix 40 mg daily (equivalent)    Active or Pending Issues Requiring Follow-up:  NICM, HFrEF    Advanced Directives:   [x] Full code  [ ] DNR  [ ] Hospice    Discharge Diagnoses:  CHF exacerbation  HFrEF

## 2025-03-06 NOTE — DISCHARGE NOTE NURSING/CASE MANAGEMENT/SOCIAL WORK - PATIENT PORTAL LINK FT
You can access the FollowMyHealth Patient Portal offered by [facility] by registering at the following website: [email or web address]. By joining [employer]’s FollowMyHealth portal, you will also be able to view your health information using other applications (apps) compatible with our system.

## 2025-03-06 NOTE — PROGRESS NOTE ADULT - PROBLEM SELECTOR PLAN 6
DVT ppx - Lovenox  Diet - DASH/TLC  Dispo - medically clear. Coordinating with SW for safe discharge. Needs close PCP, HF follow up, and medication assistance.

## 2025-03-06 NOTE — DISCHARGE NOTE PROVIDER - NSDCCPCAREPLAN_GEN_ALL_CORE_FT
PRINCIPAL DISCHARGE DIAGNOSIS  Diagnosis: Shortness of breath  Assessment and Plan of Treatment: You were hospitalized because you ran out of your heart medications and retained too much fluid. You will need to establish care with a primary care doctor and cardiologist outpatient to continue to manage your serious heart condition.

## 2025-03-06 NOTE — DISCHARGE NOTE NURSING/CASE MANAGEMENT/SOCIAL WORK - NSDCFUADDAPPT_GEN_ALL_CORE_FT
APPTS ARE READY TO BE MADE: [X] YES    Best Family or Patient Contact (if needed):    Additional Information about above appointments (if needed):    1: Needs new PCP  2:   3:     Other comments or requests:

## 2025-03-06 NOTE — PROGRESS NOTE ADULT - PROBLEM SELECTOR PLAN 4
4-6 shots of vodka daily as means to manage stress, counseled on importance of abstinence
4-6 shots of vodka daily as means to manage stress, counseled on importance of abstinence

## 2025-03-06 NOTE — DISCHARGE NOTE PROVIDER - NSDCFUADDAPPT_GEN_ALL_CORE_FT
APPTS ARE READY TO BE MADE: [X] YES    Best Family or Patient Contact (if needed):    Additional Information about above appointments (if needed):    1: Needs new PCP  2:   3:     Other comments or requests:    APPTS ARE READY TO BE MADE: [X] YES    Best Family or Patient Contact (if needed):    Additional Information about above appointments (if needed):    1: Needs new PCP  2:   3:     Other comments or requests:    Cardiology:  Appointment was scheduled by our team on the patient's behalf through the provider's office.   [facility] 03/25/2025 at 1:00 PM  [address] [place] [address] [phone]   APPTS ARE READY TO BE MADE: [X] YES    Best Family or Patient Contact (if needed):    Additional Information about above appointments (if needed):    1: Needs new PCP  2:   3:     Other comments or requests:      Internal Medicine:  Appointment was scheduled in arian  DR RAFA RUSSELL MD 03/13/2025 01:30 PM  [address] [place] [address]  [phone]    Cardiology:  Appointment was scheduled by our team on the patient's behalf through the provider's office.  [facility] 03/25/2025 at 1:00 PM  [address] [place] [phone]

## 2025-03-06 NOTE — PROGRESS NOTE ADULT - PROBLEM SELECTOR PLAN 2
Known NICM previously worked up  Seen by EP in 2024, deemed not a candidate for ICD implant at the time due to medication non-adherence  Seen by HF in 2024, recommended PET/CT and genetic testing as outpatient
Known NICM previously worked up  Seen by EP in 2024, deemed not a candidate for ICD implant at the time due to medication non-adherence  Seen by HF in 2024, recommended PET/CT and genetic testing as outpatient

## 2025-03-06 NOTE — DISCHARGE NOTE PROVIDER - CARE PROVIDER_API CALL
Alesha Nguyen  Internal Medicine  [id number] [place] [id number]  Phone: [phone]  Fax: [phone]  Established Patient  Follow Up Time:

## 2025-03-06 NOTE — DISCHARGE NOTE NURSING/CASE MANAGEMENT/SOCIAL WORK - FINANCIAL ASSISTANCE
[facility] provides services at a reduced cost to those who are determined to be eligible through [facility]’s financial assistance program. Information regarding [facility]’s financial assistance program can be found by going to [email or web address] or by calling [phone].

## 2025-03-06 NOTE — DISCHARGE NOTE NURSING/CASE MANAGEMENT/SOCIAL WORK - NSDCPEWEB_GEN_ALL_CORE
[facility] for Tobacco Control website --- [email or web address] website --- [email or web address]

## 2025-03-06 NOTE — DISCHARGE NOTE PROVIDER - NSFOLLOWUPCLINICS_GEN_ALL_ED_FT
[facility]  Cardiology  [facility], [address] [place] [address]   Phone:   Fax:   Follow Up Time: 1 week

## 2025-03-06 NOTE — PROGRESS NOTE ADULT - SUBJECTIVE AND OBJECTIVE BOX
[facility] Division of Hospital Medicine  Mary Jane Betancur MD  Available via MS Teams    SUBJECTIVE / OVERNIGHT EVENTS:  No acute events overnight. Pt feels back to hsi baseline. No SOB, CP.    MEDICATIONS  (STANDING):  albuterol/ipratropium for Nebulization 3 milliLiter(s) Nebulizer every 6 hours  buMETAnide 1 milliGRAM(s) Oral daily  enoxaparin Injectable 40 milliGRAM(s) SubCutaneous every 24 hours  lidocaine   4% Patch 1 Patch Transdermal every 24 hours  losartan 50 milliGRAM(s) Oral two times a day  metoprolol succinate ER 50 milliGRAM(s) Oral daily  nicotine - 21 mG/24Hr(s) Patch 1 Patch Transdermal daily  spironolactone 25 milliGRAM(s) Oral daily    MEDICATIONS  (PRN):  acetaminophen     Tablet .. 650 milliGRAM(s) Oral every 6 hours PRN Mild Pain (1 - 3)  aluminum hydroxide/magnesium hydroxide/simethicone Suspension 30 milliLiter(s) Oral every 4 hours PRN Dyspepsia  melatonin 3 milliGRAM(s) Oral at bedtime PRN Insomnia  ondansetron Injectable 4 milliGRAM(s) IV Push every 8 hours PRN Nausea and/or Vomiting      I&O's Summary    05 Mar 2025 07:01  -  06 Mar 2025 07:00  --------------------------------------------------------  IN: 640 mL / OUT: 2100 mL / NET: -1460 mL        PHYSICAL EXAM:  Vital Signs Last 24 Hrs  T(C): 37.2 (06 Mar 2025 05:23), Max: 37.2 (06 Mar 2025 05:23)  T(F): 98.9 (06 Mar 2025 05:23), Max: 98.9 (06 Mar 2025 05:23)  HR: 80 (06 Mar 2025 05:23) (77 - 91)  BP: 100/67 (06 Mar 2025 05:23) (100/67 - 105/74)  BP(mean): 85 (05 Mar 2025 14:00) (85 - 85)  RR: 18 (06 Mar 2025 05:23) (18 - 22)  SpO2: 96% (06 Mar 2025 05:23) (96% - 99%)    Parameters below as of 06 Mar 2025 05:23  Patient On (Oxygen Delivery Method): room air      CONSTITUTIONAL: NA  EYES: EOMI; conjunctiva and sclera clear  ENMT: Moist oral mucosa  RESPIRATORY: Normal respiratory effort; lungs are clear to auscultation bilaterally  CARDIOVASCULAR: RRR, no murmurs, no LE edema  ABDOMEN: Nontender to palpation, nondistended  PSYCH: A+O to person, place, and time; affect appropriate  NEUROLOGY: no FND    LABS:                        14.8   6.53  )-----------( 280      ( 05 Mar 2025 00:24 )             44.0     03-05    139  |  107  |  17  ----------------------------<  127[H]  4.1   |  24  |  1.12    Ca    8.4      05 Mar 2025 00:24  Phos  3.0     03-05  Mg     2.3     03-05    TPro  6.4  /  Alb  3.4  /  TBili  0.3  /  DBili  x   /  AST  15  /  ALT  21  /  AlkPhos  60  03-05    PT/INR - ( 05 Mar 2025 00:24 )   PT: 12.1 sec;   INR: 1.06 ratio         PTT - ( 05 Mar 2025 00:24 )  PTT:27.6 sec      Urinalysis Basic - ( 05 Mar 2025 00:24 )    Color: x / Appearance: x / SG: x / pH: x  Gluc: 127 mg/dL / Ketone: x  / Bili: x / Urobili: x   Blood: x / Protein: x / Nitrite: x   Leuk Esterase: x / RBC: x / WBC x   Sq Epi: x / Non Sq Epi: x / Bacteria: x        Culture - Blood (collected 03 Mar 2025 14:32)  Source: Blood Blood  Preliminary Report (05 Mar 2025 18:01):    No growth at 48 Hours    Culture - Blood (collected 03 Mar 2025 14:30)  Source: Blood Blood  Preliminary Report (05 Mar 2025 18:01):    No growth at 48 Hours      SARS-CoV-2: NotDetec (03 Mar 2025 06:34)      RADIOLOGY & ADDITIONAL TESTS:  New Imaging Personally Reviewed Today:  New Electrocardiogram Personally Reviewed Today:  Other Results Reviewed Today:   Prior or Outpatient Records Reviewed Today with Summary:    COORDINATION OF CARE:  Consultant Communication and Details of Discussion (where applicable):

## 2025-03-06 NOTE — PROGRESS NOTE ADULT - TIME BILLING
Time spent on review of vitals, physical exam, documentation, and discussion of plan of care with patient and multidisciplinary team.
- Reviewing, and interpreting labs and testing.  - Independently obtaining a review of systems and performing a physical exam  - Reviewing consultant documentation/recommendations in addition to discussing plan of care with consultants.  - Counselling and educating patient and family regarding interpretation of aforementioned items and plan of care.
chart and data review, clinical assessment, and coordination of care. This excludes any time spent on separate procedures or teaching.

## 2025-03-06 NOTE — PROGRESS NOTE ADULT - PROBLEM SELECTOR PLAN 5
Had quit smoking at one point though recently resumed smoking ~1 pack per day  Counseled on importance of smoking cessation  Will start Nicotine patch
Had quit smoking at one point though recently resumed smoking ~1 pack per day  Counseled on importance of smoking cessation  -c/w Nicotine patch

## 2025-03-06 NOTE — DISCHARGE NOTE PROVIDER - NSDCFUSCHEDAPPT_GEN_ALL_CORE_FT
[facility] Physician [facility]  CARDIOLOGY [address]  Scheduled Appointment: 03/25/2025     Alesha Nguyen  [facility]  INTMED OP [id number] [address]  Scheduled Appointment: 03/13/2025    [facility]  CARDIOLOGY [address]  Scheduled Appointment: 03/25/2025

## 2025-03-08 LAB
CULTURE RESULTS: SIGNIFICANT CHANGE UP
CULTURE RESULTS: SIGNIFICANT CHANGE UP
SPECIMEN SOURCE: SIGNIFICANT CHANGE UP
SPECIMEN SOURCE: SIGNIFICANT CHANGE UP

## 2025-03-13 ENCOUNTER — APPOINTMENT (OUTPATIENT)
Dept: INTERNAL MEDICINE | Facility: CLINIC | Age: 40
End: 2025-03-13

## 2025-03-25 ENCOUNTER — APPOINTMENT (OUTPATIENT)
Dept: CARDIOLOGY | Facility: HOSPITAL | Age: 40
End: 2025-03-25

## 2025-05-03 ENCOUNTER — EMERGENCY (EMERGENCY)
Facility: HOSPITAL | Age: 40
LOS: 1 days | End: 2025-05-03
Attending: EMERGENCY MEDICINE
Payer: MEDICAID

## 2025-05-03 VITALS
TEMPERATURE: 98 F | HEIGHT: 71 IN | OXYGEN SATURATION: 97 % | RESPIRATION RATE: 18 BRPM | WEIGHT: 238.98 LBS | HEART RATE: 74 BPM | DIASTOLIC BLOOD PRESSURE: 86 MMHG | SYSTOLIC BLOOD PRESSURE: 124 MMHG

## 2025-05-03 VITALS
TEMPERATURE: 98 F | SYSTOLIC BLOOD PRESSURE: 135 MMHG | DIASTOLIC BLOOD PRESSURE: 76 MMHG | OXYGEN SATURATION: 97 % | HEART RATE: 64 BPM | RESPIRATION RATE: 18 BRPM

## 2025-05-03 LAB
ALBUMIN SERPL ELPH-MCNC: 4.3 G/DL — SIGNIFICANT CHANGE UP (ref 3.3–5)
ALP SERPL-CCNC: 64 U/L — SIGNIFICANT CHANGE UP (ref 40–120)
ALT FLD-CCNC: 29 U/L — SIGNIFICANT CHANGE UP (ref 10–45)
ANION GAP SERPL CALC-SCNC: 12 MMOL/L — SIGNIFICANT CHANGE UP (ref 5–17)
APTT BLD: 29.8 SEC — SIGNIFICANT CHANGE UP (ref 26.1–36.8)
AST SERPL-CCNC: 24 U/L — SIGNIFICANT CHANGE UP (ref 10–40)
BASOPHILS # BLD AUTO: 0.01 K/UL — SIGNIFICANT CHANGE UP (ref 0–0.2)
BASOPHILS NFR BLD AUTO: 0.2 % — SIGNIFICANT CHANGE UP (ref 0–2)
BILIRUB SERPL-MCNC: 0.2 MG/DL — SIGNIFICANT CHANGE UP (ref 0.2–1.2)
BUN SERPL-MCNC: 12 MG/DL — SIGNIFICANT CHANGE UP (ref 7–23)
CALCIUM SERPL-MCNC: 9.3 MG/DL — SIGNIFICANT CHANGE UP (ref 8.4–10.5)
CHLORIDE SERPL-SCNC: 103 MMOL/L — SIGNIFICANT CHANGE UP (ref 96–108)
CO2 SERPL-SCNC: 24 MMOL/L — SIGNIFICANT CHANGE UP (ref 22–31)
CREAT SERPL-MCNC: 1.26 MG/DL — SIGNIFICANT CHANGE UP (ref 0.5–1.3)
EGFR: 74 ML/MIN/1.73M2 — SIGNIFICANT CHANGE UP
EGFR: 74 ML/MIN/1.73M2 — SIGNIFICANT CHANGE UP
EOSINOPHIL # BLD AUTO: 0.07 K/UL — SIGNIFICANT CHANGE UP (ref 0–0.5)
EOSINOPHIL NFR BLD AUTO: 1.7 % — SIGNIFICANT CHANGE UP (ref 0–6)
FLUAV AG NPH QL: SIGNIFICANT CHANGE UP
FLUBV AG NPH QL: SIGNIFICANT CHANGE UP
GAS PNL BLDV: SIGNIFICANT CHANGE UP
GLUCOSE SERPL-MCNC: 80 MG/DL — SIGNIFICANT CHANGE UP (ref 70–99)
HCT VFR BLD CALC: 42.9 % — SIGNIFICANT CHANGE UP (ref 39–50)
HGB BLD-MCNC: 14.8 G/DL — SIGNIFICANT CHANGE UP (ref 13–17)
IMM GRANULOCYTES NFR BLD AUTO: 0.2 % — SIGNIFICANT CHANGE UP (ref 0–0.9)
INR BLD: 0.96 RATIO — SIGNIFICANT CHANGE UP (ref 0.85–1.16)
LYMPHOCYTES # BLD AUTO: 2.28 K/UL — SIGNIFICANT CHANGE UP (ref 1–3.3)
LYMPHOCYTES # BLD AUTO: 54.5 % — HIGH (ref 13–44)
MCHC RBC-ENTMCNC: 29.5 PG — SIGNIFICANT CHANGE UP (ref 27–34)
MCHC RBC-ENTMCNC: 34.5 G/DL — SIGNIFICANT CHANGE UP (ref 32–36)
MCV RBC AUTO: 85.5 FL — SIGNIFICANT CHANGE UP (ref 80–100)
MONOCYTES # BLD AUTO: 0.34 K/UL — SIGNIFICANT CHANGE UP (ref 0–0.9)
MONOCYTES NFR BLD AUTO: 8.1 % — SIGNIFICANT CHANGE UP (ref 2–14)
NEUTROPHILS # BLD AUTO: 1.47 K/UL — LOW (ref 1.8–7.4)
NEUTROPHILS NFR BLD AUTO: 35.3 % — LOW (ref 43–77)
NRBC BLD AUTO-RTO: 0 /100 WBCS — SIGNIFICANT CHANGE UP (ref 0–0)
NT-PROBNP SERPL-SCNC: 284 PG/ML — SIGNIFICANT CHANGE UP (ref 0–300)
PLATELET # BLD AUTO: 261 K/UL — SIGNIFICANT CHANGE UP (ref 150–400)
POTASSIUM SERPL-MCNC: 3.6 MMOL/L — SIGNIFICANT CHANGE UP (ref 3.5–5.3)
POTASSIUM SERPL-SCNC: 3.6 MMOL/L — SIGNIFICANT CHANGE UP (ref 3.5–5.3)
PROT SERPL-MCNC: 7.5 G/DL — SIGNIFICANT CHANGE UP (ref 6–8.3)
PROTHROM AB SERPL-ACNC: 11 SEC — SIGNIFICANT CHANGE UP (ref 9.9–13.4)
RBC # BLD: 5.02 M/UL — SIGNIFICANT CHANGE UP (ref 4.2–5.8)
RBC # FLD: 12.3 % — SIGNIFICANT CHANGE UP (ref 10.3–14.5)
RSV RNA NPH QL NAA+NON-PROBE: SIGNIFICANT CHANGE UP
SARS-COV-2 RNA SPEC QL NAA+PROBE: SIGNIFICANT CHANGE UP
SODIUM SERPL-SCNC: 139 MMOL/L — SIGNIFICANT CHANGE UP (ref 135–145)
SOURCE RESPIRATORY: SIGNIFICANT CHANGE UP
TROPONIN T, HIGH SENSITIVITY RESULT: 10 NG/L — SIGNIFICANT CHANGE UP (ref 0–51)
TROPONIN T, HIGH SENSITIVITY RESULT: 9 NG/L — SIGNIFICANT CHANGE UP (ref 0–51)
WBC # BLD: 4.18 K/UL — SIGNIFICANT CHANGE UP (ref 3.8–10.5)
WBC # FLD AUTO: 4.18 K/UL — SIGNIFICANT CHANGE UP (ref 3.8–10.5)

## 2025-05-03 PROCEDURE — 93010 ELECTROCARDIOGRAM REPORT: CPT

## 2025-05-03 PROCEDURE — 99284 EMERGENCY DEPT VISIT MOD MDM: CPT

## 2025-05-03 PROCEDURE — 71046 X-RAY EXAM CHEST 2 VIEWS: CPT | Mod: 26

## 2025-05-03 RX ORDER — METOPROLOL SUCCINATE 50 MG/1
1 TABLET, EXTENDED RELEASE ORAL
Qty: 60 | Refills: 0
Start: 2025-05-03 | End: 2025-07-01

## 2025-05-03 RX ORDER — NICOTINE POLACRILEX 4 MG/1
1 GUM, CHEWING ORAL
Qty: 2 | Refills: 0
Start: 2025-05-03 | End: 2025-06-01

## 2025-05-03 RX ORDER — FUROSEMIDE 10 MG/ML
1 INJECTION INTRAMUSCULAR; INTRAVENOUS
Qty: 60 | Refills: 0
Start: 2025-05-03 | End: 2025-07-01

## 2025-05-03 RX ORDER — SPIRONOLACTONE 25 MG
1 TABLET ORAL
Qty: 60 | Refills: 0
Start: 2025-05-03 | End: 2025-07-01

## 2025-05-03 NOTE — ED ADULT NURSE NOTE - CINV DISCH MEDS REVIEWED YN
biba ambulatory from [facility] with c/o some minor bleeding when urinating,he pulled out the pugh catheter
Yes

## 2025-05-03 NOTE — ED ADULT NURSE REASSESSMENT NOTE - NS ED NURSE REASSESS COMMENT FT1
Assumed care from Radha GARNER. Pt resting comfortably in stretcher, AAOx4 VSS denies fever, chills, chest pain, n/v, shortness of breath, abdominal pain, dizziness, numbness or tingling, NSR on the monitor plan of care maintained, safety and comfort measure provided, pending dispo

## 2025-05-03 NOTE — ED PROVIDER NOTE - DIFFERENTIAL DIAGNOSIS
Differential Diagnosis Ddx includes, however, is not limited to: CHF exacerbation, PNA, viral syndrome, other

## 2025-05-03 NOTE — ED PROVIDER NOTE - HIV OFFER
----- Message from Liberty Colón MD sent at 8/23/2017  9:06 AM CDT -----  Please call patient to tell her she needs to start Macrobid twice daily X 7 days  
Call to patient, left message with results of UA and glucose screen. Told her script for macrobid was faxed to [facility].  
Opt out

## 2025-05-03 NOTE — ED ADULT TRIAGE NOTE - CHIEF COMPLAINT QUOTE
Pt states "I ran out of all my medications. I could not follow up the doctors because I don't have insurance and they wouldn't take me."

## 2025-05-03 NOTE — ED ADULT NURSE NOTE - OBJECTIVE STATEMENT
40-year-old male with a past medical history of NICM (EF 21% 07/24), HTN, JEFF unable to get medication refil because of his lack of insurance now presents back to the ED pt was just DC here two months ago for chronic HF. Pt denies CP, SOB, HA, vision changes, n/v/d, fevers chills, abdominal pain.

## 2025-05-03 NOTE — ED PROVIDER NOTE - PROGRESS NOTE DETAILS
Kaleigh Reagan M.D. (Resident Physician): Feeling better. W/u non-actionable. Likely viral. Will dc.

## 2025-05-03 NOTE — ED PROVIDER NOTE - CARE PLAN
Hgb 14 8 ok to proceed with therapeutic phlebotomy without Ferritin 1 Principal Discharge DX:	Cough

## 2025-05-03 NOTE — ED PROVIDER NOTE - CLINICAL SUMMARY MEDICAL DECISION MAKING FREE TEXT BOX
40 male past medical history hypertension, nonischemic cardiomyopathy, CHF presenting with cough, congestion, dyspnea on exertion, medication refill.  Patient says that his symptoms started couple days ago.  Denies any fever, chest pain, nausea, vomiting, abdominal pain, urinary symptoms, diarrhea, recent travel, sick contacts.  Says that he is not taking any of his medications for the past week because he ran out and due to his insurance he does not have a physician.  Patient takes Lasix, metoprolol, valsartan, spironolactone.  Vitals nonactionable.  On exam: Patient sitting comfortable in bed in no acute distress.  Heart regular rate.  Lungs with crackles at the bilateral bases.  Abdomen soft and nontender.  No lower extremity edema.  Differential diagnosis includes but not limited to: URI, fluid overload from CHF, pneumonia, ACS.  Plan: Blood work, chest x-ray, EKG, refill all medications.  Will reassess. 40 male past medical history hypertension, nonischemic cardiomyopathy, CHF presenting with cough, congestion, dyspnea on exertion, medication refill.  Patient says that his symptoms started couple days ago.  Denies any fever, chest pain, nausea, vomiting, abdominal pain, urinary symptoms, diarrhea, recent travel, sick contacts.  Says that he is not taking any of his medications for the past week because he ran out and due to his insurance he does not have a physician.  Patient takes Lasix, metoprolol, valsartan, spironolactone.  Vitals nonactionable.  On exam: Patient sitting comfortable in bed in no acute distress.  Heart regular rate.  Lungs with crackles at the bilateral bases.  Abdomen soft and nontender.  No lower extremity edema.  Differential diagnosis includes but not limited to: URI, fluid overload from CHF, pneumonia, ACS.  Plan: Blood work, chest x-ray, EKG, refill all medications.  Will reassess.    BARRERA Juárez MD: Agree with resident/ACP MDM, assessment and plan as above.

## 2025-05-03 NOTE — ED PROVIDER NOTE - NSFOLLOWUPINSTRUCTIONS_ED_ALL_ED_FT
You have been evaluated in the Emergency Department today for congestion, shortness of breath, and medication refills. Your evaluation did not show evidence of medical conditions requiring emergent intervention at this time.    The Hospital will call in a couple days to help you schedule primary care and cardiology appointments.    Your medications were called into [facility] in [place]. .    Return to the Emergency Department if you experience worsening symptoms or any other concerning symptoms.    Thank you for choosing us for your care.

## 2025-05-03 NOTE — ED ADULT NURSE NOTE - NSFALLUNIVINTERV_ED_ALL_ED
Bed/Stretcher in lowest position, wheels locked, appropriate side rails in place/Call bell, personal items and telephone in reach/Instruct patient to call for assistance before getting out of bed/chair/stretcher/Non-slip footwear applied when patient is off stretcher/[place] to call system/Physically safe environment - no spills, clutter or unnecessary equipment/Purposeful proactive rounding/Room/bathroom lighting operational, light cord in reach

## 2025-05-03 NOTE — ED PROVIDER NOTE - PATIENT PORTAL LINK FT
You can access the FollowMyHealth Patient Portal offered by [facility] by registering at the following website: [email or web address]. By joining [employer]’s FollowMyHealth portal, you will also be able to view your health information using other applications (apps) compatible with our system.

## 2025-07-15 PROCEDURE — 80053 COMPREHEN METABOLIC PANEL: CPT

## 2025-07-15 PROCEDURE — 99283 EMERGENCY DEPT VISIT LOW MDM: CPT | Mod: 25

## 2025-07-15 PROCEDURE — 85014 HEMATOCRIT: CPT

## 2025-07-15 PROCEDURE — 82803 BLOOD GASES ANY COMBINATION: CPT

## 2025-07-15 PROCEDURE — 82330 ASSAY OF CALCIUM: CPT

## 2025-07-15 PROCEDURE — 85018 HEMOGLOBIN: CPT

## 2025-07-15 PROCEDURE — 82947 ASSAY GLUCOSE BLOOD QUANT: CPT

## 2025-07-15 PROCEDURE — 85025 COMPLETE CBC W/AUTO DIFF WBC: CPT

## 2025-07-15 PROCEDURE — 93005 ELECTROCARDIOGRAM TRACING: CPT

## 2025-07-15 PROCEDURE — 84295 ASSAY OF SERUM SODIUM: CPT

## 2025-07-15 PROCEDURE — 71046 X-RAY EXAM CHEST 2 VIEWS: CPT

## 2025-07-15 PROCEDURE — 83605 ASSAY OF LACTIC ACID: CPT

## 2025-07-15 PROCEDURE — 83880 ASSAY OF NATRIURETIC PEPTIDE: CPT

## 2025-07-15 PROCEDURE — 82435 ASSAY OF BLOOD CHLORIDE: CPT

## 2025-07-15 PROCEDURE — 84132 ASSAY OF SERUM POTASSIUM: CPT

## 2025-07-15 PROCEDURE — 87637 SARSCOV2&INF A&B&RSV AMP PRB: CPT

## 2025-07-15 PROCEDURE — 84484 ASSAY OF TROPONIN QUANT: CPT

## 2025-07-15 PROCEDURE — 85610 PROTHROMBIN TIME: CPT

## 2025-07-15 PROCEDURE — 85730 THROMBOPLASTIN TIME PARTIAL: CPT
